# Patient Record
Sex: FEMALE | Race: WHITE | HISPANIC OR LATINO | Employment: UNEMPLOYED | RURAL
[De-identification: names, ages, dates, MRNs, and addresses within clinical notes are randomized per-mention and may not be internally consistent; named-entity substitution may affect disease eponyms.]

---

## 2017-01-16 ENCOUNTER — OFFICE VISIT (OUTPATIENT)
Dept: RETAIL CLINIC | Facility: CLINIC | Age: 27
End: 2017-01-16

## 2017-01-16 VITALS
DIASTOLIC BLOOD PRESSURE: 78 MMHG | RESPIRATION RATE: 18 BRPM | HEART RATE: 98 BPM | SYSTOLIC BLOOD PRESSURE: 118 MMHG | HEIGHT: 66 IN | OXYGEN SATURATION: 98 % | WEIGHT: 250 LBS | TEMPERATURE: 98.1 F | BODY MASS INDEX: 40.18 KG/M2

## 2017-01-16 DIAGNOSIS — A08.4 VIRAL GASTROENTERITIS: Primary | ICD-10-CM

## 2017-01-16 PROCEDURE — 99213 OFFICE O/P EST LOW 20 MIN: CPT | Performed by: NURSE PRACTITIONER

## 2017-01-16 RX ORDER — ONDANSETRON 4 MG/1
4 TABLET, ORALLY DISINTEGRATING ORAL EVERY 8 HOURS PRN
Qty: 10 TABLET | Refills: 0 | Status: SHIPPED | OUTPATIENT
Start: 2017-01-16 | End: 2017-01-18

## 2017-01-16 NOTE — PATIENT INSTRUCTIONS
Viral Gastroenteritis  Viral gastroenteritis is also called stomach flu. This illness is caused by a certain type of germ (virus). It can cause sudden watery poop (diarrhea) and throwing up (vomiting). This can cause you to lose body fluids (dehydration). This illness usually lasts for 3 to 8 days. It usually goes away on its own.  HOME CARE   · Drink enough fluids to keep your pee (urine) clear or pale yellow. Drink small amounts of fluids often.  · Ask your doctor how to replace body fluid losses (rehydration).  · Avoid:    Foods high in sugar.    Alcohol.    Bubbly (carbonated) drinks.    Tobacco.    Juice.    Caffeine drinks.    Very hot or cold fluids.    Fatty, greasy foods.    Eating too much at one time.    Dairy products until 24 to 48 hours after your watery poop stops.  · You may eat foods with active cultures (probiotics). They can be found in some yogurts and supplements.  · Wash your hands well to avoid spreading the illness.  · Only take medicines as told by your doctor. Do not give aspirin to children. Do not take medicines for watery poop (antidiarrheals).  · Ask your doctor if you should keep taking your regular medicines.  · Keep all doctor visits as told.  GET HELP RIGHT AWAY IF:   · You cannot keep fluids down.  · You do not pee at least once every 6 to 8 hours.  · You are short of breath.  · You see blood in your poop or throw up. This may look like coffee grounds.  · You have belly (abdominal) pain that gets worse or is just in one small spot (localized).  · You keep throwing up or having watery poop.  · You have a fever.  · The patient is a child younger than 3 months, and he or she has a fever.  · The patient is a child older than 3 months, and he or she has a fever and problems that do not go away.  · The patient is a child older than 3 months, and he or she has a fever and problems that suddenly get worse.  · The patient is a baby, and he or she has no tears when crying.  MAKE SURE YOU:    · Understand these instructions.  · Will watch your condition.  · Will get help right away if you are not doing well or get worse.     This information is not intended to replace advice given to you by your health care provider. Make sure you discuss any questions you have with your health care provider.     Document Released: 06/05/2009 Document Revised: 03/11/2013 Document Reviewed: 10/03/2012  [employer] Interactive Patient Education ©2016 [employer]    Food Choices to Help Relieve Diarrhea, Adult  When you have diarrhea, the foods you eat and your eating habits are very important. Choosing the right foods and drinks can help relieve diarrhea. Also, because diarrhea can last up to 7 days, you need to replace lost fluids and electrolytes (such as sodium, potassium, and chloride) in order to help prevent dehydration.   WHAT GENERAL GUIDELINES DO I NEED TO FOLLOW?  · Slowly drink 1 cup (8 oz) of fluid for each episode of diarrhea. If you are getting enough fluid, your urine will be clear or pale yellow.  · Eat starchy foods. Some good choices include white rice, white toast, pasta, low-fiber cereal, baked potatoes (without the skin), saltine crackers, and bagels.  · Avoid large servings of any cooked vegetables.  · Limit fruit to two servings per day. A serving is ½ cup or 1 small piece.  · Choose foods with less than 2 g of fiber per serving.  · Limit fats to less than 8 tsp (38 g) per day.  · Avoid fried foods.  · Eat foods that have probiotics in them. Probiotics can be found in certain dairy products.  · Avoid foods and beverages that may increase the speed at which food moves through the stomach and intestines (gastrointestinal tract). Things to avoid include:  ¨ High-fiber foods, such as dried fruit, raw fruits and vegetables, nuts, seeds, and whole grain foods.  ¨ Spicy foods and high-fat foods.  ¨ Foods and beverages sweetened with high-fructose corn syrup, honey, or sugar alcohols such as xylitol,  sorbitol, and mannitol.  WHAT FOODS ARE RECOMMENDED?  Grains  White rice. White, French, or luz maria breads (fresh or toasted), including plain rolls, buns, or bagels. White pasta. Saltine, soda, or anuel crackers. Pretzels. Low-fiber cereal. Cooked cereals made with water (such as cornmeal, farina, or cream cereals). Plain muffins. Matzo. [place] toast. Zwieback.   Vegetables  Potatoes (without the skin). Strained tomato and vegetable juices. Most well-cooked and canned vegetables without seeds. Tender lettuce.  Fruits  Cooked or canned applesauce, apricots, cherries, fruit cocktail, grapefruit, peaches, pears, or plums. Fresh bananas, apples without skin, cherries, grapes, cantaloupe, grapefruit, peaches, oranges, or plums.   Meat and Other Protein Products  Baked or boiled chicken. Eggs. Tofu. Fish. Seafood. Smooth peanut butter. Ground or well-cooked tender beef, ham, veal, lamb, pork, or poultry.   Dairy  Plain yogurt, kefir, and unsweetened liquid yogurt. Lactose-free milk, buttermilk, or soy milk. Plain hard cheese.  Beverages  Sport drinks. Clear broths. Diluted fruit juices (except prune). Regular, caffeine-free sodas such as ginger ale. Water. Decaffeinated teas. Oral rehydration solutions. Sugar-free beverages not sweetened with sugar alcohols.  Other  Bouillon, broth, or soups made from recommended foods.   The items listed above may not be a complete list of recommended foods or beverages. Contact your dietitian for more options.  WHAT FOODS ARE NOT RECOMMENDED?  Grains  Whole grain, whole wheat, bran, or rye breads, rolls, pastas, crackers, and cereals. Wild or brown rice. Cereals that contain more than 2 g of fiber per serving. Corn tortillas or taco shells. Cooked or dry oatmeal. Granola. Popcorn.  Vegetables  Raw vegetables. Cabbage, broccoli, [place] sprouts, artichokes, baked beans, beet greens, corn, kale, legumes, peas, sweet potatoes, and yams. Potato skins. Cooked spinach and  cabbage.  Fruits  Dried fruit, including raisins and dates. Raw fruits. Stewed or dried prunes. Fresh apples with skin, apricots, mangoes, pears, raspberries, and strawberries.   Meat and Other Protein Products  [place] peanut butter. Nuts and seeds. Beans and lentils. Correia.   Dairy  High-fat cheeses. Milk, chocolate milk, and beverages made with milk, such as milk shakes. Cream. Ice cream.  Sweets and Desserts  Sweet rolls, doughnuts, and sweet breads. Pancakes and waffles.  Fats and Oils  Butter. Cream sauces. Margarine. Salad oils. Plain salad dressings. Olives. Avocados.   Beverages  Caffeinated beverages (such as coffee, tea, soda, or energy drinks). Alcoholic beverages. Fruit juices with pulp. Prune juice. Soft drinks sweetened with high-fructose corn syrup or sugar alcohols.  Other  Coconut. Hot sauce. Chili powder. Mayonnaise. Gravy. Cream-based or milk-based soups.   The items listed above may not be a complete list of foods and beverages to avoid. Contact your dietitian for more information.  WHAT SHOULD I DO IF I BECOME DEHYDRATED?  Diarrhea can sometimes lead to dehydration. Signs of dehydration include dark urine and dry mouth and skin. If you think you are dehydrated, you should rehydrate with an oral rehydration solution. These solutions can be purchased at pharmacies, retail stores, or online.   Drink ½-1 cup (120-240 mL) of oral rehydration solution each time you have an episode of diarrhea. If drinking this amount makes your diarrhea worse, try drinking smaller amounts more often. For example, drink 1-3 tsp (5-15 mL) every 5-10 minutes.   A general rule for staying hydrated is to drink 1½-2 L of fluid per day. Talk to your health care provider about the specific amount you should be drinking each day. Drink enough fluids to keep your urine clear or pale yellow.     This information is not intended to replace advice given to you by your health care provider. Make sure you discuss any questions you  have with your health care provider.     Document Released: 03/09/2005 Document Revised: 01/08/2016 Document Reviewed: 11/10/2014  [employer]vier Interactive Patient Education ©2016 [employer] Inc.

## 2017-01-16 NOTE — MR AVS SNAPSHOT
Mariela Stuart   1/16/2017 9:00 AM   Office Visit    Dept Phone:  [phone]   Encounter #:  [id number]    Provider:  LUIS MAGANA   Department:  [facility]                Your Full Care Plan              Today's Medication Changes          These changes are accurate as of: 1/16/17  9:30 AM.  If you have any questions, ask your nurse or doctor.               New Medication(s)Ordered:     ondansetron ODT 4 MG disintegrating tablet   Commonly known as:  ZOFRAN ODT   Take 1 tablet by mouth Every 8 (Eight) Hours As Needed for nausea or vomiting for up to 2 days.            Where to Get Your Medications      These medications were sent to [facility] [address] [place] [address] - [address] - [phone]  - [phone] [facility] [email or web address] [facility], [facility] [address]     Phone:  [phone]     ondansetron ODT 4 MG disintegrating tablet                  Your Updated Medication List          This list is accurate as of: 1/16/17  9:30 AM.  Always use your most recent med list.                albuterol 108 (90 BASE) MCG/ACT inhaler   Commonly known as:  PROVENTIL HFA;VENTOLIN HFA   Inhale 2 puffs Every 4 (Four) Hours As Needed for wheezing.       celecoxib 100 MG capsule   Commonly known as:  CeleBREX       clarithromycin 500 MG tablet   Commonly known as:  BIAXIN       dextromethorphan-guaifenesin  MG/5ML syrup   Commonly known as:  ROBITUSSIN-DM       diclofenac-misoprostol 75-0.2 MG EC tablet   Commonly known as:  ARTHROTEC 75       dicyclomine 10 MG capsule   Commonly known as:  BENTYL       estradiol cypionate 5 MG/ML injection   Commonly known as:  DEPO-ESTRADIOL       HYDROcodone-acetaminophen 7.5-325 MG per tablet   Commonly known as:  NORCO   Take 1 tablet by mouth Every 6 (Six) Hours As Needed for moderate pain (4-6).       meloxicam 7.5 MG tablet   Commonly known as:  MOBIC       methocarbamol 750 MG tablet   Commonly known as:  ROBAXIN       metoprolol  tartrate 100 MG tablet   Commonly known as:  LOPRESSOR       ondansetron ODT 4 MG disintegrating tablet   Commonly known as:  ZOFRAN ODT   Take 1 tablet by mouth Every 8 (Eight) Hours As Needed for nausea or vomiting for up to 2 days.       oxaprozin 600 MG tablet   Commonly known as:  DAYPRO   Take 1 tablet by mouth 2 (Two) Times a Day.       PRILOSEC PO       promethazine-dextromethorphan 6.25-15 MG/5ML syrup   Commonly known as:  PROMETHAZINE-DM   Take 5 mL by mouth 4 (Four) Times a Day As Needed for cough.       traMADol 50 MG tablet   Commonly known as:  ULTRAM               You Were Diagnosed With        Codes Comments    Viral gastroenteritis    -  Primary ICD-10-CM: A08.4  ICD-9-CM: 008.8       Instructions    Viral Gastroenteritis  Viral gastroenteritis is also called stomach flu. This illness is caused by a certain type of germ (virus). It can cause sudden watery poop (diarrhea) and throwing up (vomiting). This can cause you to lose body fluids (dehydration). This illness usually lasts for 3 to 8 days. It usually goes away on its own.  HOME CARE   · Drink enough fluids to keep your pee (urine) clear or pale yellow. Drink small amounts of fluids often.  · Ask your doctor how to replace body fluid losses (rehydration).  · Avoid:    Foods high in sugar.    Alcohol.    Bubbly (carbonated) drinks.    Tobacco.    Juice.    Caffeine drinks.    Very hot or cold fluids.    Fatty, greasy foods.    Eating too much at one time.    Dairy products until 24 to 48 hours after your watery poop stops.  · You may eat foods with active cultures (probiotics). They can be found in some yogurts and supplements.  · Wash your hands well to avoid spreading the illness.  · Only take medicines as told by your doctor. Do not give aspirin to children. Do not take medicines for watery poop (antidiarrheals).  · Ask your doctor if you should keep taking your regular medicines.  · Keep all doctor visits as told.  GET HELP RIGHT AWAY IF:    · You cannot keep fluids down.  · You do not pee at least once every 6 to 8 hours.  · You are short of breath.  · You see blood in your poop or throw up. This may look like coffee grounds.  · You have belly (abdominal) pain that gets worse or is just in one small spot (localized).  · You keep throwing up or having watery poop.  · You have a fever.  · The patient is a child younger than 3 months, and he or she has a fever.  · The patient is a child older than 3 months, and he or she has a fever and problems that do not go away.  · The patient is a child older than 3 months, and he or she has a fever and problems that suddenly get worse.  · The patient is a baby, and he or she has no tears when crying.  MAKE SURE YOU:   · Understand these instructions.  · Will watch your condition.  · Will get help right away if you are not doing well or get worse.     This information is not intended to replace advice given to you by your health care provider. Make sure you discuss any questions you have with your health care provider.     Document Released: 06/05/2009 Document Revised: 03/11/2013 Document Reviewed: 10/03/2012  [email or web address] Interactive Patient Education ©2016 [email or web address]    Food Choices to Help Relieve Diarrhea, Adult  When you have diarrhea, the foods you eat and your eating habits are very important. Choosing the right foods and drinks can help relieve diarrhea. Also, because diarrhea can last up to 7 days, you need to replace lost fluids and electrolytes (such as sodium, potassium, and chloride) in order to help prevent dehydration.   WHAT GENERAL GUIDELINES DO I NEED TO FOLLOW?  · Slowly drink 1 cup (8 oz) of fluid for each episode of diarrhea. If you are getting enough fluid, your urine will be clear or pale yellow.  · Eat starchy foods. Some good choices include white rice, white toast, pasta, low-fiber cereal, baked potatoes (without the skin), saltine crackers, and bagels.  · Avoid large servings of any  cooked vegetables.  · Limit fruit to two servings per day. A serving is ½ cup or 1 small piece.  · Choose foods with less than 2 g of fiber per serving.  · Limit fats to less than 8 tsp (38 g) per day.  · Avoid fried foods.  · Eat foods that have probiotics in them. Probiotics can be found in certain dairy products.  · Avoid foods and beverages that may increase the speed at which food moves through the stomach and intestines (gastrointestinal tract). Things to avoid include:  ¨ High-fiber foods, such as dried fruit, raw fruits and vegetables, nuts, seeds, and whole grain foods.  ¨ Spicy foods and high-fat foods.  ¨ Foods and beverages sweetened with high-fructose corn syrup, honey, or sugar alcohols such as xylitol, sorbitol, and mannitol.  WHAT FOODS ARE RECOMMENDED?  Grains  White rice. White, French, or luz maria breads (fresh or toasted), including plain rolls, buns, or bagels. White pasta. Saltine, soda, or anuel crackers. Pretzels. Low-fiber cereal. Cooked cereals made with water (such as cornmeal, farina, or cream cereals). Plain muffins. Matzo. [place] toast. Zwieback.   Vegetables  Potatoes (without the skin). Strained tomato and vegetable juices. Most well-cooked and canned vegetables without seeds. Tender lettuce.  Fruits  Cooked or canned applesauce, apricots, cherries, fruit cocktail, grapefruit, peaches, pears, or plums. Fresh bananas, apples without skin, cherries, grapes, cantaloupe, grapefruit, peaches, oranges, or plums.   Meat and Other Protein Products  Baked or boiled chicken. Eggs. Tofu. Fish. Seafood. Smooth peanut butter. Ground or well-cooked tender beef, ham, veal, lamb, pork, or poultry.   Dairy  Plain yogurt, kefir, and unsweetened liquid yogurt. Lactose-free milk, buttermilk, or soy milk. Plain hard cheese.  Beverages  Sport drinks. Clear broths. Diluted fruit juices (except prune). Regular, caffeine-free sodas such as ginger ale. Water. Decaffeinated teas. Oral rehydration solutions.  Sugar-free beverages not sweetened with sugar alcohols.  Other  Bouillon, broth, or soups made from recommended foods.   The items listed above may not be a complete list of recommended foods or beverages. Contact your dietitian for more options.  WHAT FOODS ARE NOT RECOMMENDED?  Grains  Whole grain, whole wheat, bran, or rye breads, rolls, pastas, crackers, and cereals. Wild or brown rice. Cereals that contain more than 2 g of fiber per serving. Corn tortillas or taco shells. Cooked or dry oatmeal. Granola. Popcorn.  Vegetables  Raw vegetables. Cabbage, broccoli, [place] sprouts, artichokes, baked beans, beet greens, corn, kale, legumes, peas, sweet potatoes, and yams. Potato skins. Cooked spinach and cabbage.  Fruits  Dried fruit, including raisins and dates. Raw fruits. Stewed or dried prunes. Fresh apples with skin, apricots, mangoes, pears, raspberries, and strawberries.   Meat and Other Protein Products  [place] peanut butter. Nuts and seeds. Beans and lentils. Correia.   Dairy  High-fat cheeses. Milk, chocolate milk, and beverages made with milk, such as milk shakes. Cream. Ice cream.  Sweets and Desserts  Sweet rolls, doughnuts, and sweet breads. Pancakes and waffles.  Fats and Oils  Butter. Cream sauces. Margarine. Salad oils. Plain salad dressings. Olives. Avocados.   Beverages  Caffeinated beverages (such as coffee, tea, soda, or energy drinks). Alcoholic beverages. Fruit juices with pulp. Prune juice. Soft drinks sweetened with high-fructose corn syrup or sugar alcohols.  Other  Coconut. Hot sauce. Chili powder. Mayonnaise. Gravy. Cream-based or milk-based soups.   The items listed above may not be a complete list of foods and beverages to avoid. Contact your dietitian for more information.  WHAT SHOULD I DO IF I BECOME DEHYDRATED?  Diarrhea can sometimes lead to dehydration. Signs of dehydration include dark urine and dry mouth and skin. If you think you are dehydrated, you should rehydrate with an oral  rehydration solution. These solutions can be purchased at pharmacies, retail stores, or online.   Drink ½-1 cup (120-240 mL) of oral rehydration solution each time you have an episode of diarrhea. If drinking this amount makes your diarrhea worse, try drinking smaller amounts more often. For example, drink 1-3 tsp (5-15 mL) every 5-10 minutes.   A general rule for staying hydrated is to drink 1½-2 L of fluid per day. Talk to your health care provider about the specific amount you should be drinking each day. Drink enough fluids to keep your urine clear or pale yellow.     This information is not intended to replace advice given to you by your health care provider. Make sure you discuss any questions you have with your health care provider.     Document Released: 03/09/2005 Document Revised: 01/08/2016 Document Reviewed: 11/10/2014  [employer] Interactive Patient Education ©2016 [employer]       Patient Instructions History      Upcoming Appointments     Visit Type Date Time Department    OFFICE VISIT 1/16/2017  9:00 AM [facility]    FOLLOW UP 1/17/2017 10:20 AM [facility]      MyChart Signup     Our records indicate that you have declined [facility] [employer] signup. If you would like to sign up for [employer], please email [email or web address] or call [phone] to obtain an activation code.             Other Info from Your Visit           Your Appointments     Jan 17, 2017 10:20 AM CST   Follow Up with Marvin Jensen MD   [facility] (--)    [address] [facility] [address]  [facility] [address] [phone]           Arrive 15 minutes prior to appointment.              Allergies     Amiodarone      Latex      Nabumetone Intolerance Nausea Only    Penicillins  Hives    Sulfa Antibiotics  Hives      Reason for Visit     Vomiting x 4 days      Vital Signs     Blood Pressure Pulse Temperature Respirations Height Weight    118/78 (BP Location: Right arm,  "Patient Position: Sitting, Cuff Size: Adult) 98 98.1 °F (36.7 °C) (Tympanic) 18 65.5\" (166.4 cm) 250 lb (113 kg)    Last Menstrual Period Oxygen Saturation Body Mass Index Smoking Status          11/15/2016 98% 40.97 kg/m2 Never Smoker        Problems and Diagnoses Noted     Viral gastroenteritis    -  Primary        "

## 2017-01-16 NOTE — PROGRESS NOTES
Subjective   Mariela Stuart is a 26 y.o. female.     Vomiting    This is a new problem. Episode onset: 4 days ago. Episode frequency: 4 times daily. The problem has been unchanged. There has been no fever. Associated symptoms include diarrhea. Pertinent negatives include no abdominal pain, chills, fever or myalgias. Treatments tried: Papto Bismol, TUMS. The treatment provided mild relief.        The following portions of the patient's history were reviewed and updated as appropriate: allergies, current medications, past family history, past medical history, past social history, past surgical history and problem list.    Review of Systems   Constitutional: Negative for chills and fever.   HENT: Negative.    Respiratory: Negative.    Cardiovascular: Negative.    Gastrointestinal: Positive for diarrhea, nausea and vomiting. Negative for abdominal pain and blood in stool.   Musculoskeletal: Negative for myalgias.       Objective   Physical Exam   Constitutional: Vital signs are normal. She appears well-developed and well-nourished. She is active.   Cardiovascular: Normal rate, regular rhythm, S1 normal, S2 normal and normal heart sounds.    Pulmonary/Chest: She has no decreased breath sounds. She has no wheezes. She has no rhonchi. She has no rales.   Abdominal: Soft. Normal appearance. There is generalized tenderness (Mild generalized abdominal discomfort with palpation). There is no rigidity, no rebound and no guarding.   Neurological: She is alert.       Assessment/Plan   Mariela was seen today for vomiting.    Diagnoses and all orders for this visit:    Viral gastroenteritis  -     ondansetron ODT (ZOFRAN ODT) 4 MG disintegrating tablet; Take 1 tablet by mouth Every 8 (Eight) Hours As Needed for nausea or vomiting for up to 2 days.      - When nausea controlled, begin sipping fluids and advance diet as tolerated.  - If develops fever, increased abdominal pain, or vomiting not controlled with medication, see PCP or  go to ER.

## 2017-02-19 ENCOUNTER — OFFICE VISIT (OUTPATIENT)
Dept: RETAIL CLINIC | Facility: CLINIC | Age: 27
End: 2017-02-19

## 2017-02-19 VITALS
RESPIRATION RATE: 18 BRPM | WEIGHT: 251 LBS | TEMPERATURE: 98.5 F | HEIGHT: 65 IN | HEART RATE: 101 BPM | BODY MASS INDEX: 41.82 KG/M2 | OXYGEN SATURATION: 98 % | DIASTOLIC BLOOD PRESSURE: 74 MMHG | SYSTOLIC BLOOD PRESSURE: 118 MMHG

## 2017-02-19 DIAGNOSIS — G43.009 MIGRAINE WITHOUT AURA AND WITHOUT STATUS MIGRAINOSUS, NOT INTRACTABLE: Primary | ICD-10-CM

## 2017-02-19 DIAGNOSIS — R11.0 NAUSEA: ICD-10-CM

## 2017-02-19 PROCEDURE — 99213 OFFICE O/P EST LOW 20 MIN: CPT | Performed by: NURSE PRACTITIONER

## 2017-02-19 PROCEDURE — 96372 THER/PROPH/DIAG INJ SC/IM: CPT | Performed by: NURSE PRACTITIONER

## 2017-02-19 RX ORDER — ONDANSETRON 4 MG/1
4 TABLET, ORALLY DISINTEGRATING ORAL EVERY 8 HOURS PRN
Qty: 10 TABLET | Refills: 0 | Status: SHIPPED | OUTPATIENT
Start: 2017-02-19 | End: 2017-02-21

## 2017-02-19 RX ORDER — KETOROLAC TROMETHAMINE 30 MG/ML
60 INJECTION, SOLUTION INTRAMUSCULAR; INTRAVENOUS ONCE
Status: COMPLETED | OUTPATIENT
Start: 2017-02-19 | End: 2017-02-19

## 2017-02-19 RX ADMIN — KETOROLAC TROMETHAMINE 60 MG: 30 INJECTION, SOLUTION INTRAMUSCULAR; INTRAVENOUS at 15:46

## 2017-02-19 NOTE — PATIENT INSTRUCTIONS
Migraine Headache  A migraine headache is very bad, throbbing pain on one or both sides of your head. Talk to your doctor about what things may bring on (trigger) your migraine headaches.  HOME CARE  · Only take medicines as told by your doctor.  · Lie down in a dark, quiet room when you have a migraine.  · Keep a journal to find out if certain things bring on migraine headaches. For example, write down:    What you eat and drink.    How much sleep you get.    Any change to your diet or medicines.  · Lessen how much alcohol you drink.  · Quit smoking if you smoke.  · Get enough sleep.  · Lessen any stress in your life.  · Keep lights dim if bright lights bother you or make your migraines worse.  GET HELP RIGHT AWAY IF:   · Your migraine becomes really bad.  · You have a fever.  · You have a stiff neck.  · You have trouble seeing.  · Your muscles are weak, or you lose muscle control.  · You lose your balance or have trouble walking.  · You feel like you will pass out (faint), or you pass out.  · You have really bad symptoms that are different than your first symptoms.  MAKE SURE YOU:   · Understand these instructions.  · Will watch your condition.  · Will get help right away if you are not doing well or get worse.     This information is not intended to replace advice given to you by your health care provider. Make sure you discuss any questions you have with your health care provider.     Document Released: 09/26/2009 Document Revised: 03/11/2013 Document Reviewed: 08/25/2014  [employer] Interactive Patient Education ©2016 [employer] Inc.

## 2017-02-19 NOTE — PROGRESS NOTES
Subjective   Mariela Stuart is a 26 y.o. female.     Headache    This is a new problem. The current episode started yesterday. The problem occurs constantly. The problem has been gradually worsening. The pain is located in the bilateral region. The pain radiates to the right neck and left neck. The pain quality is similar to prior headaches (Has history of migraine headaches. Has not had one in several years but states this is the exact same type headache.). The pain is at a severity of 9/10. The pain is moderate. Associated symptoms include nausea, neck pain and photophobia. Pertinent negatives include no abnormal behavior, blurred vision, coughing, dizziness, ear pain, eye pain, fever, loss of balance, muscle aches, numbness, rhinorrhea, seizures, sinus pressure, sore throat, tingling, visual change, vomiting or weakness. The symptoms are aggravated by bright light. Treatments tried: Took Tylenol yesterday and a Norco today (strained her left shoulder that was operated on a few months ago) The treatment provided no relief. Her past medical history is significant for migraine headaches.        The following portions of the patient's history were reviewed and updated as appropriate: allergies, current medications, past family history, past medical history, past social history, past surgical history and problem list.    Review of Systems   Constitutional: Negative for chills and fever.   HENT: Negative for congestion, ear pain, postnasal drip, rhinorrhea, sinus pressure and sore throat.    Eyes: Positive for photophobia. Negative for blurred vision, pain and visual disturbance.   Respiratory: Negative for cough.    Cardiovascular: Negative.    Gastrointestinal: Positive for nausea. Negative for diarrhea and vomiting.   Musculoskeletal: Positive for neck pain.   Neurological: Positive for headaches. Negative for dizziness, tingling, tremors, seizures, syncope, facial asymmetry, speech difficulty, weakness,  light-headedness, numbness and loss of balance.       Objective   Physical Exam   Constitutional: She is oriented to person, place, and time. Vital signs are normal. She appears well-developed and well-nourished. She is active.   HENT:   Head: Normocephalic.   Right Ear: Hearing, tympanic membrane, external ear and ear canal normal.   Left Ear: Hearing, tympanic membrane, external ear and ear canal normal.   Nose: Nose normal. Right sinus exhibits no maxillary sinus tenderness and no frontal sinus tenderness. Left sinus exhibits no maxillary sinus tenderness and no frontal sinus tenderness.   Mouth/Throat: Uvula is midline, oropharynx is clear and moist and mucous membranes are normal.   Eyes: Conjunctivae, EOM and lids are normal. Pupils are equal, round, and reactive to light.   Neck: Normal range of motion. Neck supple.   Cardiovascular: Normal rate, regular rhythm, S1 normal, S2 normal and normal heart sounds.    Pulmonary/Chest: Effort normal and breath sounds normal. She has no decreased breath sounds. She has no wheezes. She has no rhonchi. She has no rales.   Lymphadenopathy:     She has no cervical adenopathy.   Neurological: She is alert and oriented to person, place, and time. She has normal strength. No cranial nerve deficit or sensory deficit. She displays a negative Romberg sign.       Assessment/Plan   Mariela was seen today for headache.    Diagnoses and all orders for this visit:    Migraine without aura and without status migrainosus, not intractable  -     ketorolac (TORADOL) injection 60 mg; Inject 60 mg into the shoulder, thigh, or buttocks 1 (One) Time.    Nausea  -     ondansetron ODT (ZOFRAN ODT) 4 MG disintegrating tablet; Take 1 tablet by mouth Every 8 (Eight) Hours As Needed for nausea or vomiting for up to 2 days.      - Given Toradol injection  - Lie down in dark quiet room  - Tylenol as needed  - If headache continues or worsens, begins vomiting, or has visual disturbance, go to ER

## 2017-05-05 ENCOUNTER — OFFICE VISIT (OUTPATIENT)
Dept: RETAIL CLINIC | Facility: CLINIC | Age: 27
End: 2017-05-05

## 2017-05-05 VITALS
HEART RATE: 112 BPM | TEMPERATURE: 99.2 F | RESPIRATION RATE: 20 BRPM | WEIGHT: 243.2 LBS | DIASTOLIC BLOOD PRESSURE: 80 MMHG | SYSTOLIC BLOOD PRESSURE: 140 MMHG | HEIGHT: 66 IN | OXYGEN SATURATION: 98 % | BODY MASS INDEX: 39.08 KG/M2

## 2017-05-05 DIAGNOSIS — J01.00 ACUTE MAXILLARY SINUSITIS, RECURRENCE NOT SPECIFIED: ICD-10-CM

## 2017-05-05 DIAGNOSIS — J40 BRONCHITIS: Primary | ICD-10-CM

## 2017-05-05 PROCEDURE — 99213 OFFICE O/P EST LOW 20 MIN: CPT | Performed by: NURSE PRACTITIONER

## 2017-05-05 PROCEDURE — 96372 THER/PROPH/DIAG INJ SC/IM: CPT | Performed by: NURSE PRACTITIONER

## 2017-05-05 RX ORDER — TOPIRAMATE 25 MG/1
25 TABLET ORAL 2 TIMES DAILY
COMMUNITY
End: 2018-04-20 | Stop reason: ALTCHOICE

## 2017-05-05 RX ORDER — ALBUTEROL SULFATE 90 UG/1
2 AEROSOL, METERED RESPIRATORY (INHALATION) EVERY 4 HOURS PRN
Qty: 6.7 G | Refills: 0 | Status: SHIPPED | OUTPATIENT
Start: 2017-05-05 | End: 2021-09-13

## 2017-05-05 RX ORDER — BUTALBITAL, ACETAMINOPHEN AND CAFFEINE 50; 325; 40 MG/1; MG/1; MG/1
1 TABLET ORAL EVERY 4 HOURS PRN
COMMUNITY
End: 2018-04-20 | Stop reason: ALTCHOICE

## 2017-05-05 RX ORDER — BROMPHENIRAMINE MALEATE, PSEUDOEPHEDRINE HYDROCHLORIDE, AND DEXTROMETHORPHAN HYDROBROMIDE 2; 30; 10 MG/5ML; MG/5ML; MG/5ML
5-10 SYRUP ORAL 4 TIMES DAILY PRN
Qty: 120 ML | Refills: 0 | Status: SHIPPED | OUTPATIENT
Start: 2017-05-05 | End: 2018-04-10

## 2017-05-05 RX ORDER — CLARITHROMYCIN 500 MG/1
500 TABLET, COATED ORAL 2 TIMES DAILY
Qty: 20 TABLET | Refills: 0 | Status: SHIPPED | OUTPATIENT
Start: 2017-05-05 | End: 2017-05-15

## 2017-05-05 RX ORDER — METHYLPREDNISOLONE ACETATE 80 MG/ML
80 INJECTION, SUSPENSION INTRA-ARTICULAR; INTRALESIONAL; INTRAMUSCULAR; SOFT TISSUE ONCE
Status: COMPLETED | OUTPATIENT
Start: 2017-05-05 | End: 2017-05-05

## 2017-05-05 RX ORDER — NABUMETONE 500 MG/1
500 TABLET, FILM COATED ORAL 2 TIMES DAILY PRN
COMMUNITY
End: 2018-04-20 | Stop reason: ALTCHOICE

## 2017-05-05 RX ADMIN — METHYLPREDNISOLONE ACETATE 80 MG: 80 INJECTION, SUSPENSION INTRA-ARTICULAR; INTRALESIONAL; INTRAMUSCULAR; SOFT TISSUE at 15:09

## 2017-05-08 ENCOUNTER — OFFICE VISIT (OUTPATIENT)
Dept: RETAIL CLINIC | Facility: CLINIC | Age: 27
End: 2017-05-08

## 2017-05-08 VITALS
OXYGEN SATURATION: 98 % | TEMPERATURE: 98.6 F | SYSTOLIC BLOOD PRESSURE: 108 MMHG | RESPIRATION RATE: 20 BRPM | HEIGHT: 66 IN | WEIGHT: 239 LBS | HEART RATE: 103 BPM | BODY MASS INDEX: 38.41 KG/M2 | DIASTOLIC BLOOD PRESSURE: 72 MMHG

## 2017-05-08 DIAGNOSIS — R11.2 NAUSEA AND VOMITING, INTRACTABILITY OF VOMITING NOT SPECIFIED, UNSPECIFIED VOMITING TYPE: Primary | ICD-10-CM

## 2017-05-08 PROCEDURE — 96372 THER/PROPH/DIAG INJ SC/IM: CPT | Performed by: NURSE PRACTITIONER

## 2017-05-08 PROCEDURE — 99213 OFFICE O/P EST LOW 20 MIN: CPT | Performed by: NURSE PRACTITIONER

## 2017-05-08 RX ORDER — PROMETHAZINE HYDROCHLORIDE 25 MG/ML
25 INJECTION, SOLUTION INTRAMUSCULAR; INTRAVENOUS ONCE
Status: COMPLETED | OUTPATIENT
Start: 2017-05-08 | End: 2017-05-08

## 2017-05-08 RX ORDER — ONDANSETRON 4 MG/1
4 TABLET, ORALLY DISINTEGRATING ORAL EVERY 8 HOURS PRN
Qty: 10 TABLET | Refills: 0 | Status: SHIPPED | OUTPATIENT
Start: 2017-05-08 | End: 2017-05-10

## 2017-05-08 RX ADMIN — PROMETHAZINE HYDROCHLORIDE 25 MG: 25 INJECTION, SOLUTION INTRAMUSCULAR; INTRAVENOUS at 09:32

## 2017-06-09 ENCOUNTER — APPOINTMENT (OUTPATIENT)
Dept: GENERAL RADIOLOGY | Facility: HOSPITAL | Age: 27
End: 2017-06-09

## 2017-06-09 ENCOUNTER — HOSPITAL ENCOUNTER (EMERGENCY)
Facility: HOSPITAL | Age: 27
Discharge: HOME | End: 2017-06-09
Attending: EMERGENCY MEDICINE | Admitting: EMERGENCY MEDICINE

## 2017-06-09 VITALS
TEMPERATURE: 98.1 F | WEIGHT: 238 LBS | BODY MASS INDEX: 39.65 KG/M2 | HEIGHT: 65 IN | HEART RATE: 106 BPM | SYSTOLIC BLOOD PRESSURE: 124 MMHG | RESPIRATION RATE: 26 BRPM | OXYGEN SATURATION: 96 % | DIASTOLIC BLOOD PRESSURE: 68 MMHG

## 2017-06-09 DIAGNOSIS — R07.89 CHEST WALL PAIN: ICD-10-CM

## 2017-06-09 DIAGNOSIS — R09.1 PLEURISY: Primary | ICD-10-CM

## 2017-06-09 LAB
ALBUMIN SERPL-MCNC: 4 G/DL (ref 3.4–4.8)
ALBUMIN/GLOB SERPL: 1.3 G/DL (ref 1.1–1.8)
ALP SERPL-CCNC: 106 U/L (ref 38–126)
ALT SERPL W P-5'-P-CCNC: 44 U/L (ref 9–52)
ANION GAP SERPL CALCULATED.3IONS-SCNC: 11 MMOL/L (ref 5–15)
AST SERPL-CCNC: 23 U/L (ref 14–36)
BASOPHILS # BLD AUTO: 0.02 10*3/MM3 (ref 0–0.2)
BASOPHILS NFR BLD AUTO: 0.2 % (ref 0–2)
BILIRUB SERPL-MCNC: 0.3 MG/DL (ref 0.2–1.3)
BUN BLD-MCNC: 10 MG/DL (ref 7–21)
BUN/CREAT SERPL: 18.5 (ref 7–25)
CALCIUM SPEC-SCNC: 9.2 MG/DL (ref 8.4–10.2)
CHLORIDE SERPL-SCNC: 104 MMOL/L (ref 95–110)
CO2 SERPL-SCNC: 24 MMOL/L (ref 22–31)
CREAT BLD-MCNC: 0.54 MG/DL (ref 0.5–1)
D-DIMER, QUANTITATIVE (MAD,POW, STR): 403 NG/ML (FEU) (ref 0–470)
DEPRECATED RDW RBC AUTO: 45.8 FL (ref 36.4–46.3)
EOSINOPHIL # BLD AUTO: 0.22 10*3/MM3 (ref 0–0.7)
EOSINOPHIL NFR BLD AUTO: 1.7 % (ref 0–7)
ERYTHROCYTE [DISTWIDTH] IN BLOOD BY AUTOMATED COUNT: 16.4 % (ref 11.5–14.5)
GFR SERPL CREATININE-BSD FRML MDRD: 135 ML/MIN/1.73 (ref 71–165)
GLOBULIN UR ELPH-MCNC: 3.2 GM/DL (ref 2.3–3.5)
GLUCOSE BLD-MCNC: 90 MG/DL (ref 60–100)
HCT VFR BLD AUTO: 39.4 % (ref 35–45)
HGB BLD-MCNC: 12.8 G/DL (ref 12–15.5)
HOLD SPECIMEN: NORMAL
HOLD SPECIMEN: NORMAL
IMM GRANULOCYTES # BLD: 0.04 10*3/MM3 (ref 0–0.02)
IMM GRANULOCYTES NFR BLD: 0.3 % (ref 0–0.5)
INR PPP: 1.06 (ref 0.8–1.2)
LYMPHOCYTES # BLD AUTO: 2.82 10*3/MM3 (ref 0.6–4.2)
LYMPHOCYTES NFR BLD AUTO: 22 % (ref 10–50)
MCH RBC QN AUTO: 24.5 PG (ref 26.5–34)
MCHC RBC AUTO-ENTMCNC: 32.5 G/DL (ref 31.4–36)
MCV RBC AUTO: 75.3 FL (ref 80–98)
MONOCYTES # BLD AUTO: 0.68 10*3/MM3 (ref 0–0.9)
MONOCYTES NFR BLD AUTO: 5.3 % (ref 0–12)
NEUTROPHILS # BLD AUTO: 9.05 10*3/MM3 (ref 2–8.6)
NEUTROPHILS NFR BLD AUTO: 70.5 % (ref 37–80)
NT-PROBNP SERPL-MCNC: 24.9 PG/ML (ref 0–450)
PLATELET # BLD AUTO: 408 10*3/MM3 (ref 150–450)
PMV BLD AUTO: 9.6 FL (ref 8–12)
POTASSIUM BLD-SCNC: 4 MMOL/L (ref 3.5–5.1)
PROT SERPL-MCNC: 7.2 G/DL (ref 6.3–8.6)
PROTHROMBIN TIME: 13.7 SECONDS (ref 11.1–15.3)
RBC # BLD AUTO: 5.23 10*6/MM3 (ref 3.77–5.16)
SODIUM BLD-SCNC: 139 MMOL/L (ref 137–145)
TROPONIN I SERPL-MCNC: <0.012 NG/ML
WBC NRBC COR # BLD: 12.83 10*3/MM3 (ref 3.2–9.8)
WHOLE BLOOD HOLD SPECIMEN: NORMAL
WHOLE BLOOD HOLD SPECIMEN: NORMAL

## 2017-06-09 PROCEDURE — 96374 THER/PROPH/DIAG INJ IV PUSH: CPT

## 2017-06-09 PROCEDURE — 83880 ASSAY OF NATRIURETIC PEPTIDE: CPT | Performed by: EMERGENCY MEDICINE

## 2017-06-09 PROCEDURE — 84484 ASSAY OF TROPONIN QUANT: CPT | Performed by: EMERGENCY MEDICINE

## 2017-06-09 PROCEDURE — 80053 COMPREHEN METABOLIC PANEL: CPT | Performed by: EMERGENCY MEDICINE

## 2017-06-09 PROCEDURE — 93005 ELECTROCARDIOGRAM TRACING: CPT

## 2017-06-09 PROCEDURE — 25010000002 LORAZEPAM PER 2 MG: Performed by: EMERGENCY MEDICINE

## 2017-06-09 PROCEDURE — 96375 TX/PRO/DX INJ NEW DRUG ADDON: CPT

## 2017-06-09 PROCEDURE — 99284 EMERGENCY DEPT VISIT MOD MDM: CPT

## 2017-06-09 PROCEDURE — 25010000002 HYDROMORPHONE PER 4 MG: Performed by: EMERGENCY MEDICINE

## 2017-06-09 PROCEDURE — 25010000002 ONDANSETRON PER 1 MG: Performed by: EMERGENCY MEDICINE

## 2017-06-09 PROCEDURE — 25010000002 KETOROLAC TROMETHAMINE PER 15 MG: Performed by: EMERGENCY MEDICINE

## 2017-06-09 PROCEDURE — 85610 PROTHROMBIN TIME: CPT | Performed by: EMERGENCY MEDICINE

## 2017-06-09 PROCEDURE — 85025 COMPLETE CBC W/AUTO DIFF WBC: CPT | Performed by: EMERGENCY MEDICINE

## 2017-06-09 PROCEDURE — 96361 HYDRATE IV INFUSION ADD-ON: CPT

## 2017-06-09 PROCEDURE — 85379 FIBRIN DEGRADATION QUANT: CPT | Performed by: EMERGENCY MEDICINE

## 2017-06-09 PROCEDURE — 25010000002 METHYLPREDNISOLONE PER 125 MG: Performed by: EMERGENCY MEDICINE

## 2017-06-09 PROCEDURE — 93010 ELECTROCARDIOGRAM REPORT: CPT | Performed by: INTERNAL MEDICINE

## 2017-06-09 PROCEDURE — 71020 HC CHEST PA AND LATERAL: CPT

## 2017-06-09 RX ORDER — HYDROCODONE BITARTRATE AND ACETAMINOPHEN 5; 325 MG/1; MG/1
1 TABLET ORAL EVERY 6 HOURS PRN
Qty: 12 TABLET | Refills: 0 | Status: SHIPPED | OUTPATIENT
Start: 2017-06-09 | End: 2018-04-20 | Stop reason: ALTCHOICE

## 2017-06-09 RX ORDER — AZITHROMYCIN 250 MG/1
TABLET, FILM COATED ORAL
Qty: 6 TABLET | Refills: 0 | Status: SHIPPED | OUTPATIENT
Start: 2017-06-09 | End: 2018-04-10

## 2017-06-09 RX ORDER — LORAZEPAM 2 MG/ML
1 INJECTION INTRAMUSCULAR ONCE
Status: COMPLETED | OUTPATIENT
Start: 2017-06-09 | End: 2017-06-09

## 2017-06-09 RX ORDER — IPRATROPIUM BROMIDE AND ALBUTEROL SULFATE 2.5; .5 MG/3ML; MG/3ML
3 SOLUTION RESPIRATORY (INHALATION) ONCE
Status: COMPLETED | OUTPATIENT
Start: 2017-06-09 | End: 2017-06-09

## 2017-06-09 RX ORDER — ONDANSETRON 4 MG/1
4 TABLET, ORALLY DISINTEGRATING ORAL EVERY 8 HOURS PRN
COMMUNITY
End: 2018-04-20 | Stop reason: ALTCHOICE

## 2017-06-09 RX ORDER — CLONAZEPAM 0.5 MG/1
0.5 TABLET ORAL 2 TIMES DAILY PRN
COMMUNITY
End: 2018-04-20 | Stop reason: ALTCHOICE

## 2017-06-09 RX ORDER — METHYLPREDNISOLONE SODIUM SUCCINATE 125 MG/2ML
125 INJECTION, POWDER, LYOPHILIZED, FOR SOLUTION INTRAMUSCULAR; INTRAVENOUS ONCE
Status: COMPLETED | OUTPATIENT
Start: 2017-06-09 | End: 2017-06-09

## 2017-06-09 RX ORDER — PREDNISONE 50 MG/1
50 TABLET ORAL DAILY
Qty: 5 TABLET | Refills: 0 | Status: SHIPPED | OUTPATIENT
Start: 2017-06-09 | End: 2018-04-10

## 2017-06-09 RX ORDER — ONDANSETRON 2 MG/ML
4 INJECTION INTRAMUSCULAR; INTRAVENOUS EVERY 6 HOURS PRN
Status: DISCONTINUED | OUTPATIENT
Start: 2017-06-09 | End: 2017-06-10 | Stop reason: HOSPADM

## 2017-06-09 RX ORDER — SODIUM CHLORIDE 0.9 % (FLUSH) 0.9 %
10 SYRINGE (ML) INJECTION AS NEEDED
Status: DISCONTINUED | OUTPATIENT
Start: 2017-06-09 | End: 2017-06-10 | Stop reason: HOSPADM

## 2017-06-09 RX ORDER — KETOROLAC TROMETHAMINE 30 MG/ML
30 INJECTION, SOLUTION INTRAMUSCULAR; INTRAVENOUS ONCE
Status: COMPLETED | OUTPATIENT
Start: 2017-06-09 | End: 2017-06-09

## 2017-06-09 RX ADMIN — HYDROMORPHONE HYDROCHLORIDE 0.5 MG: 1 INJECTION, SOLUTION INTRAMUSCULAR; INTRAVENOUS; SUBCUTANEOUS at 21:57

## 2017-06-09 RX ADMIN — ONDANSETRON 4 MG: 2 INJECTION INTRAMUSCULAR; INTRAVENOUS at 20:06

## 2017-06-09 RX ADMIN — METHYLPREDNISOLONE SODIUM SUCCINATE 125 MG: 125 INJECTION, POWDER, FOR SOLUTION INTRAMUSCULAR; INTRAVENOUS at 21:46

## 2017-06-09 RX ADMIN — KETOROLAC TROMETHAMINE 30 MG: 30 INJECTION, SOLUTION INTRAMUSCULAR at 20:03

## 2017-06-09 RX ADMIN — SODIUM CHLORIDE 500 ML: 9 INJECTION, SOLUTION INTRAVENOUS at 20:02

## 2017-06-09 RX ADMIN — Medication 10 ML: at 21:46

## 2017-06-09 RX ADMIN — HYDROMORPHONE HYDROCHLORIDE 1 MG: 1 INJECTION, SOLUTION INTRAMUSCULAR; INTRAVENOUS; SUBCUTANEOUS at 21:47

## 2017-06-09 RX ADMIN — IPRATROPIUM BROMIDE AND ALBUTEROL SULFATE 3 ML: 2.5; .5 SOLUTION RESPIRATORY (INHALATION) at 21:47

## 2017-06-09 RX ADMIN — LORAZEPAM 1 MG: 2 INJECTION INTRAMUSCULAR; INTRAVENOUS at 20:06

## 2017-06-10 NOTE — DISCHARGE INSTRUCTIONS
Followup with Dr. Crowder for further management as needed.  Return with any new or worsening symptoms or any concerns.

## 2017-06-10 NOTE — ED PROVIDER NOTES
Subjective   HPI Comments: Patient presents with a chest discomfort to the left upper chest this evening.  States worse with palpation, deep breathing and when she walks the area pulls.  Cramping type sensation with squeezing quality.  Radiation down the left arm with the squeeze.  No f/c.  No n/v.  No recent illnesses, no cardiac history.  No significant family hx.  Patient is on birth control, depoprovera.        History provided by:  Patient   used: No        Review of Systems   Constitutional: Negative.  Negative for appetite change, chills and fever.   HENT: Negative.  Negative for congestion.    Eyes: Negative.  Negative for photophobia and visual disturbance.   Respiratory: Positive for shortness of breath. Negative for cough and chest tightness.    Cardiovascular: Positive for chest pain. Negative for palpitations.   Gastrointestinal: Positive for abdominal pain (some epigastric discomfort earlier today) and nausea. Negative for constipation, diarrhea and vomiting.   Endocrine: Negative.    Genitourinary: Negative.  Negative for decreased urine volume, dysuria, flank pain and hematuria.   Musculoskeletal: Negative.  Negative for arthralgias, back pain, myalgias, neck pain and neck stiffness.   Skin: Negative.  Negative for pallor.   Neurological: Negative.  Negative for dizziness, syncope, weakness, light-headedness, numbness and headaches.   Psychiatric/Behavioral: Negative.  Negative for confusion and suicidal ideas. The patient is not nervous/anxious.    All other systems reviewed and are negative.      Past Medical History:   Diagnosis Date   • Acid reflux    • Allergic    • Anxiety    • Depression    • Gallbladder abscess    • Migraines    • Sinusitis    • Stomach ulcer        Allergies   Allergen Reactions   • Amiodarone    • Latex    • Nabumetone Nausea Only   • Penicillins Hives   • Sulfa Antibiotics Hives       Past Surgical History:   Procedure Laterality Date   •  CHOLECYSTECTOMY     • FINGER NAIL SURGERY     • GALLBLADDER SURGERY     • TONSILLECTOMY AND ADENOIDECTOMY     • WISDOM TOOTH EXTRACTION         Family History   Problem Relation Age of Onset   • Arthritis Mother    • Diabetes Mother    • Cancer Mother        Social History     Social History   • Marital status: Single     Spouse name: N/A   • Number of children: N/A   • Years of education: N/A     Social History Main Topics   • Smoking status: Never Smoker   • Smokeless tobacco: None   • Alcohol use No   • Drug use: No   • Sexual activity: Defer     Other Topics Concern   • None     Social History Narrative   • None           Objective   Physical Exam   Constitutional: She is oriented to person, place, and time. She appears well-developed and well-nourished. She appears distressed (anxious).   HENT:   Head: Normocephalic and atraumatic.   Nose: Nose normal.   Mouth/Throat: Oropharynx is clear and moist.   Eyes: Conjunctivae and EOM are normal. No scleral icterus.   Neck: Normal range of motion. Neck supple. No JVD present.   Cardiovascular: Normal rate, regular rhythm, normal heart sounds and intact distal pulses.  Exam reveals no gallop and no friction rub.    No murmur heard.  Left sided chest discomfort with palpation, deep breathing, and movement of the arm.  Intermittent spasm in the region.    Pulmonary/Chest: Effort normal. No respiratory distress. She has no wheezes. She has no rales. She exhibits no tenderness.   Abdominal: Soft. She exhibits no distension and no mass. There is no tenderness. There is no rebound and no guarding.   Musculoskeletal: Normal range of motion. She exhibits no edema, tenderness or deformity.   Lymphadenopathy:     She has no cervical adenopathy.   Neurological: She is alert and oriented to person, place, and time. No cranial nerve deficit. She exhibits normal muscle tone.   Skin: Skin is warm and dry. No rash noted. She is not diaphoretic. No erythema. No pallor.   Psychiatric:  She has a normal mood and affect. Her behavior is normal. Judgment and thought content normal.   Nursing note and vitals reviewed.      Procedures         ED Course  ED Course      Labs Reviewed   CBC WITH AUTO DIFFERENTIAL - Abnormal; Notable for the following:        Result Value    WBC 12.83 (*)     RBC 5.23 (*)     MCV 75.3 (*)     MCH 24.5 (*)     RDW 16.4 (*)     Neutrophils, Absolute 9.05 (*)     Immature Grans, Absolute 0.04 (*)     All other components within normal limits   TROPONIN (IN-HOUSE) - Normal   COMPREHENSIVE METABOLIC PANEL - Normal   BNP (IN-HOUSE) - Normal   PROTIME-INR - Normal    Narrative:     Therapeutic range for most indications is 2.0-3.0 INR,  or 2.5-3.5 for mechanical heart valves.   D-DIMER, QUANTITATIVE - Normal    Narrative:     Dimer values <500 ng/ml FEU are FDA approved as aid in diagnosis of deep venous thrombosis and pulmonary embolism.  This test should not be used in an exclusion strategy with pretest probability alone.    A recent guideline regarding diagnosis for pulmonary thomboembolism recommends an adjusted exclusion criterion of age x 10 ng/ml FEU for patients >50 years of age ([employer] Intern Med 2015; 163: 701-711).   RAINBOW DRAW    Narrative:     The following orders were created for panel order [place] Draw.  Procedure                               Abnormality         Status                     ---------                               -----------         ------                     Light Blue Top[[id number]]                                   Final result               Green Top (Gel)[[id number]]                                  Final result               Lavender Top[[id number]]                                     Final result               Gold Top - SST[[id number]]                                   Final result                 Please view results for these tests on the individual orders.   TROPONIN (IN-HOUSE)   LIGHT BLUE TOP   GREEN TOP   LAVENDER TOP   GOLD TOP - SST    CBC AND DIFFERENTIAL    Narrative:     The following orders were created for panel order CBC & Differential.  Procedure                               Abnormality         Status                     ---------                               -----------         ------                     CBC Auto Differential[[id number]]        Abnormal            Final result                 Please view results for these tests on the individual orders.       XR Chest 2 View   Final Result   No radiographic evidence of acute cardiopulmonary   disease.      Electronically signed by:  Dorita Syed MD  6/9/2017 8:23 PM CDT   Workstation: [id number]        No significant acute findings.  Suspect musculoskeletal vrs pleurisy.  Outpatient followup.                [facility]    Final diagnoses:   Pleurisy   Chest wall pain            Jaya Issa MD  06/09/17 [id number]

## 2018-04-10 ENCOUNTER — HOSPITAL ENCOUNTER (EMERGENCY)
Facility: HOSPITAL | Age: 28
Discharge: HOME | End: 2018-04-10
Attending: EMERGENCY MEDICINE | Admitting: EMERGENCY MEDICINE

## 2018-04-10 ENCOUNTER — APPOINTMENT (OUTPATIENT)
Dept: GENERAL RADIOLOGY | Facility: HOSPITAL | Age: 28
End: 2018-04-10

## 2018-04-10 ENCOUNTER — APPOINTMENT (OUTPATIENT)
Dept: CT IMAGING | Facility: HOSPITAL | Age: 28
End: 2018-04-10

## 2018-04-10 VITALS
DIASTOLIC BLOOD PRESSURE: 74 MMHG | TEMPERATURE: 97.9 F | RESPIRATION RATE: 20 BRPM | HEIGHT: 65 IN | OXYGEN SATURATION: 97 % | HEART RATE: 101 BPM | WEIGHT: 240 LBS | BODY MASS INDEX: 39.99 KG/M2 | SYSTOLIC BLOOD PRESSURE: 144 MMHG

## 2018-04-10 DIAGNOSIS — J02.9 PHARYNGITIS, UNSPECIFIED ETIOLOGY: Primary | ICD-10-CM

## 2018-04-10 LAB
HETEROPH AB SER QL LA: NEGATIVE
S PYO AG THROAT QL: NEGATIVE

## 2018-04-10 PROCEDURE — 87880 STREP A ASSAY W/OPTIC: CPT | Performed by: EMERGENCY MEDICINE

## 2018-04-10 PROCEDURE — 70490 CT SOFT TISSUE NECK W/O DYE: CPT

## 2018-04-10 PROCEDURE — 70360 X-RAY EXAM OF NECK: CPT

## 2018-04-10 PROCEDURE — 86308 HETEROPHILE ANTIBODY SCREEN: CPT | Performed by: EMERGENCY MEDICINE

## 2018-04-10 PROCEDURE — 87081 CULTURE SCREEN ONLY: CPT | Performed by: EMERGENCY MEDICINE

## 2018-04-10 PROCEDURE — 99283 EMERGENCY DEPT VISIT LOW MDM: CPT

## 2018-04-10 RX ORDER — CETIRIZINE HYDROCHLORIDE, PSEUDOEPHEDRINE HYDROCHLORIDE 5; 120 MG/1; MG/1
1 TABLET, FILM COATED, EXTENDED RELEASE ORAL 2 TIMES DAILY
Qty: 14 TABLET | Refills: 0 | Status: SHIPPED | OUTPATIENT
Start: 2018-04-10 | End: 2018-04-20 | Stop reason: ALTCHOICE

## 2018-04-11 NOTE — ED PROVIDER NOTES
Subjective   28yo female presents ED c/o 3wk hx odynophagia.  Pt reports initial symptoms included sinus pressure/nasal congestion/post nasal discharge.  Pt seen urgent care for same with rx zithromax/steroid injection, followed by clarithromycin rx.  Pt reports URI symptoms resolved, however, has persistent odynophagia, dysphagia.  ROS neg fever/chills/cough/rash.        Sore Throat   Location:  Generalized  Timing:  Constant  Chronicity:  New      Review of Systems   Constitutional: Negative.    HENT: Positive for sore throat.    Eyes: Negative.    Respiratory: Negative.    Cardiovascular: Negative.    Gastrointestinal: Negative.        Past Medical History:   Diagnosis Date   • Acid reflux    • Allergic    • Anxiety    • Depression    • Gallbladder abscess    • Migraines    • Sinusitis    • Stomach ulcer        Allergies   Allergen Reactions   • Amiodarone    • Latex    • Nabumetone Nausea Only   • Penicillins Hives   • Sulfa Antibiotics Hives       Past Surgical History:   Procedure Laterality Date   • CHOLECYSTECTOMY     • FINGER NAIL SURGERY     • GALLBLADDER SURGERY     • TONSILLECTOMY AND ADENOIDECTOMY     • WISDOM TOOTH EXTRACTION         Family History   Problem Relation Age of Onset   • Arthritis Mother    • Diabetes Mother    • Cancer Mother        Social History     Social History   • Marital status: Single     Social History Main Topics   • Smoking status: Never Smoker   • Alcohol use No   • Drug use: No   • Sexual activity: Defer     Other Topics Concern   • Not on file           Objective   Physical Exam   Constitutional: She is oriented to person, place, and time. She appears well-developed and well-nourished.   HENT:   Head: Normocephalic and atraumatic.   Right Ear: External ear normal.   Left Ear: External ear normal.   Nose: Nose normal.   Mouth/Throat: Uvula is midline and mucous membranes are normal. Posterior oropharyngeal erythema present. No oropharyngeal exudate, posterior oropharyngeal  edema or tonsillar abscesses.   Eyes: Pupils are equal, round, and reactive to light.   Neck: Neck supple. No JVD present. No tracheal deviation present.   Cardiovascular: Normal rate, regular rhythm, normal heart sounds and intact distal pulses.  Exam reveals no gallop and no friction rub.    No murmur heard.  Pulmonary/Chest: Effort normal and breath sounds normal. No stridor. She has no wheezes. She has no rales.   Abdominal: Soft. Bowel sounds are normal. She exhibits no mass. There is no tenderness. There is no rebound and no guarding.   Lymphadenopathy:     She has cervical adenopathy.   Neurological: She is oriented to person, place, and time.   Skin: Skin is warm and dry. Capillary refill takes less than 2 seconds.   Nursing note and vitals reviewed.      Procedures         ED Course  ED Course      Labs Reviewed   RAPID STREP A SCREEN - Normal   MONONUCLEOSIS SCREEN - Normal   BETA HEMOLYTIC STREP CULTURE, THROAT     Xr Neck Soft Tissue    Result Date: 4/10/2018  Narrative: PROCEDURE: XR NECK SOFT TISSUE CLINICAL INDICATION: odynophagia COMPARISON STUDY: None VIEWS: 2 FINDINGS: Airway is patent. The tip of the epiglottis is not well evaluated and may be slightly thickened.. No prevertebral soft tissue swelling is evident. No abnormal gas collections in soft tissues. Mild reversal of normal lordotic curve is present which may be positional or due to muscle spasm.     Impression: The epiglottis is not optimally visualized and the tip may be slightly thickened. Epiglottitis, while an unusual finding in a patient of this age, cannot be excluded. No other significant abnormality identified. Electronically signed by:  Jyoti Alejandra MD  4/10/2018 9:53 PM CDT Workstation: [id number]    Ct Soft Tissue Neck Without Contrast    Result Date: 4/10/2018  Narrative: CT neck without contrast on  4/10/2018 CLINICAL INDICATION: Sore throat, difficulty swallowing, pharyngitis TECHNIQUE: Multiple axial images are obtained  throughout the neck without the administration of contrast. This exam was performed according to our departmental dose-optimization program, which includes automated exposure control, adjustment of the mA and/or kV according to patient size and/or use of iterative reconstruction technique. Total DLP is 306.5 mGy*cm. COMPARISON: None FINDINGS: Evaluation of the neck without contrast is limited. The lung apices are clear. Mild likely reactive bilateral jugular chain adenopathy is noted. There is no prevertebral soft tissue swelling. The epiglottis and airway is unremarkable. No mass or fluid collection is noted. No gross mucosal lesion is noted. No bony abnormality is noted.     Impression: Essentially unremarkable examination by unenhanced imaging. Electronically signed by:  Donn Mercer  4/10/2018 10:22 PM CDT Workstation: [id number]                MDM    Final diagnoses:   Pharyngitis, unspecified etiology            Harinder Rodriguez MD  04/10/18 [id number]

## 2018-04-11 NOTE — ED NOTES
PT states she had upper respiratory infection 3 weeks ago, but the sore throat persist, and hard to swallow and eat.     Tal Leone, RNA  04/10/18 2052

## 2018-04-13 LAB — BACTERIA SPEC AEROBE CULT: NORMAL

## 2018-04-20 ENCOUNTER — OFFICE VISIT (OUTPATIENT)
Dept: GASTROENTEROLOGY | Facility: CLINIC | Age: 28
End: 2018-04-20

## 2018-04-20 VITALS
WEIGHT: 245.2 LBS | BODY MASS INDEX: 40.85 KG/M2 | SYSTOLIC BLOOD PRESSURE: 108 MMHG | DIASTOLIC BLOOD PRESSURE: 70 MMHG | HEART RATE: 84 BPM | HEIGHT: 65 IN

## 2018-04-20 DIAGNOSIS — R11.14 BILIOUS VOMITING WITH NAUSEA: ICD-10-CM

## 2018-04-20 DIAGNOSIS — R63.0 DECREASED APPETITE: ICD-10-CM

## 2018-04-20 DIAGNOSIS — R13.10 DYSPHAGIA, UNSPECIFIED TYPE: Primary | ICD-10-CM

## 2018-04-20 DIAGNOSIS — K21.9 GASTROESOPHAGEAL REFLUX DISEASE, ESOPHAGITIS PRESENCE NOT SPECIFIED: ICD-10-CM

## 2018-04-20 PROCEDURE — 99214 OFFICE O/P EST MOD 30 MIN: CPT | Performed by: NURSE PRACTITIONER

## 2018-04-20 RX ORDER — PANTOPRAZOLE SODIUM 40 MG/1
40 TABLET, DELAYED RELEASE ORAL DAILY
Qty: 30 TABLET | Refills: 5 | Status: SHIPPED | OUTPATIENT
Start: 2018-04-20 | End: 2020-09-03

## 2018-04-20 RX ORDER — DEXTROSE AND SODIUM CHLORIDE 5; .45 G/100ML; G/100ML
30 INJECTION, SOLUTION INTRAVENOUS CONTINUOUS PRN
Status: CANCELLED | OUTPATIENT
Start: 2018-05-23

## 2018-04-20 RX ORDER — OMEPRAZOLE 40 MG/1
40 CAPSULE, DELAYED RELEASE ORAL DAILY
COMMUNITY
End: 2018-04-20

## 2018-04-20 NOTE — PROGRESS NOTES
Chief Complaint   Patient presents with   • Difficulty Swallowing   • Heartburn       Subjective    Adri Stuart is a 27 y.o. female. she is being seen for consultation today at the request of MARIA GUADALUPE Hanna.  Patient reports over the last 3 weeks she began having dysphagia which has become severely worsened in the last 2-3 days.  Accompanied by reflux.  Also reports a decreased appetite.  She denies any changes in her bowel habits or blood within her stool.    Difficulty Swallowing   This is a new problem. The current episode started more than 1 month ago. The problem occurs daily. Associated symptoms include abdominal pain, coughing (cough up mucousy material ), fatigue, nausea, a sore throat, vomiting (about 3 times per day depending on intake) and weakness. Pertinent negatives include no anorexia, arthralgias, change in bowel habit, chest pain, chills, congestion or neck pain.   Heartburn   She complains of abdominal pain, belching, choking, coughing (cough up mucousy material ), early satiety, globus sensation, heartburn, a hoarse voice, nausea and a sore throat. She reports no chest pain. This is a chronic problem. The current episode started more than 1 year ago (Started 1-2 years ago recently worsened ). The problem has been gradually worsening. The heartburn wakes her from sleep. The heartburn limits her activity. The heartburn changes with position. The symptoms are aggravated by certain foods (all foods ). Associated symptoms include fatigue. She has tried a PPI, a diet change, an antacid and a histamine-2 antagonist (prilosec, MOM, tums, pepcid, zantac) for the symptoms. The treatment provided mild relief. Past procedures include an abdominal ultrasound and an EGD. Stent in CBD removed in 2012.   Plan; we'll schedule patient for EGD due to dysphagia, reflux, nausea vomiting and abdominal pain.  We'll start patient on PPI daily.  Follow-up after test return to office sooner if  needed.         The following portions of the patient's history were reviewed and updated as appropriate:   Past Medical History:   Diagnosis Date   • Acid reflux    • Allergic    • Anxiety    • Depression    • Gallbladder abscess    • Migraines    • Sinusitis    • Stomach ulcer      Past Surgical History:   Procedure Laterality Date   • CHOLECYSTECTOMY     • FINGER NAIL SURGERY     • GALLBLADDER SURGERY     • TONSILLECTOMY AND ADENOIDECTOMY     • WISDOM TOOTH EXTRACTION       Family History   Problem Relation Age of Onset   • Arthritis Mother    • Diabetes Mother    • Cancer Mother      breat cancer   • Hypertension Mother    • Hypertension Father    • Hypertension Maternal Grandmother    • Heart disease Maternal Grandfather    • Hypertension Maternal Grandfather      OB History     No data available        Current Outpatient Prescriptions   Medication Sig Dispense Refill   • albuterol (PROVENTIL HFA;VENTOLIN HFA) 108 (90 BASE) MCG/ACT inhaler Inhale 2 puffs Every 4 (Four) Hours As Needed for Wheezing. 6.7 g 0   • estradiol cypionate (DEPO-ESTRADIOL) 5 MG/ML injection Inject  into the shoulder, thigh, or buttocks Every 28 (Twenty-Eight) Days.     • pantoprazole (PROTONIX) 40 MG EC tablet Take 1 tablet by mouth Daily. 30 tablet 5     No current facility-administered medications for this visit.      Allergies   Allergen Reactions   • Amiodarone    • Latex    • Nabumetone Nausea Only   • Penicillins Hives   • Sulfa Antibiotics Hives     Social History     Social History   • Marital status: Single     Social History Main Topics   • Smoking status: Never Smoker   • Smokeless tobacco: Never Used   • Alcohol use No   • Drug use: No   • Sexual activity: Defer     Other Topics Concern   • Not on file       Review of Systems  Review of Systems   Constitutional: Positive for fatigue. Negative for chills.   HENT: Positive for hoarse voice and sore throat. Negative for congestion.    Respiratory: Positive for cough (cough up  "mucousy material ) and choking.    Cardiovascular: Negative for chest pain.   Gastrointestinal: Positive for abdominal pain, heartburn, nausea and vomiting (about 3 times per day depending on intake). Negative for anorexia and change in bowel habit.   Musculoskeletal: Negative for arthralgias and neck pain.   Neurological: Positive for weakness.        /70   Pulse 84   Ht 165.1 cm (65\")   Wt 111 kg (245 lb 3.2 oz)   BMI 40.80 kg/m²     Objective    Physical Exam   Constitutional: She is oriented to person, place, and time. She appears well-developed and well-nourished. She is cooperative. No distress.   HENT:   Head: Normocephalic and atraumatic.   Neck: Normal range of motion. Neck supple. No thyromegaly present.   Cardiovascular: Normal rate, regular rhythm and normal heart sounds.    Pulmonary/Chest: Effort normal and breath sounds normal. She has no wheezes. She has no rhonchi. She has no rales.   Abdominal: Soft. Normal appearance and bowel sounds are normal. She exhibits no distension. There is no hepatosplenomegaly. There is generalized tenderness. There is no rigidity and no guarding. No hernia.   Lymphadenopathy:     She has no cervical adenopathy.   Neurological: She is alert and oriented to person, place, and time.   Skin: Skin is warm, dry and intact. No rash noted. No pallor.   Psychiatric: She has a normal mood and affect. Her speech is normal.     Admission on 04/10/2018, Discharged on 04/10/2018   Component Date Value Ref Range Status   • Strep A Ag 04/10/2018 Negative  Negative Final   • Monospot 04/10/2018 Negative  Negative Final   • Throat Culture, Beta Strep 04/13/2018 No Beta Hemolytic Streptococcus Isolated at 2 days   Final     Assessment/Plan      1. Dysphagia, unspecified type    2. Gastroesophageal reflux disease, esophagitis presence not specified    3. Bilious vomiting with nausea    4. Decreased appetite    .       Orders placed during this encounter " include:    ESOPHAGOGASTRODUODENOSCOPY possible dilation (N/A)    Review and/or summary of lab tests, radiology, procedures, medications. Review and summary of old records and obtaining of history. The risks and benefits of my recommendations, as well as other treatment options were discussed with the patient today. Questions were answered.    New Medications Ordered This Visit   Medications   • pantoprazole (PROTONIX) 40 MG EC tablet     Sig: Take 1 tablet by mouth Daily.     Dispense:  30 tablet     Refill:  5       Follow-up: Return in about 4 weeks (around 5/18/2018) for Recheck after procedure.          This document has been electronically signed by MARIA GUADALUPE Heredia on April 24, 2018 4:25 PM             Results for orders placed or performed during the hospital encounter of 04/10/18   Rapid Strep A Screen - Swab, Throat   Result Value Ref Range    Strep A Ag Negative Negative   Mononucleosis Screen   Result Value Ref Range    Monospot Negative Negative   Beta Strep Culture, Throat - Swab, Throat   Result Value Ref Range    Throat Culture, Beta Strep No Beta Hemolytic Streptococcus Isolated at 2 days    Results for orders placed or performed during the hospital encounter of 06/09/17   Gold Top - SST   Result Value Ref Range    Extra Tube Hold for add-ons.    Green Top (Gel)   Result Value Ref Range    Extra Tube Hold for add-ons.    CBC Auto Differential   Result Value Ref Range    WBC 12.83 (H) 3.20 - 9.80 10*3/mm3    RBC 5.23 (H) 3.77 - 5.16 10*6/mm3    Hemoglobin 12.8 12.0 - 15.5 g/dL    Hematocrit 39.4 35.0 - 45.0 %    MCV 75.3 (L) 80.0 - 98.0 fL    MCH 24.5 (L) 26.5 - 34.0 pg    MCHC 32.5 31.4 - 36.0 g/dL    RDW 16.4 (H) 11.5 - 14.5 %    RDW-SD 45.8 36.4 - 46.3 fl    MPV 9.6 8.0 - 12.0 fL    Platelets 408 150 - 450 10*3/mm3    Neutrophil % 70.5 37.0 - 80.0 %    Lymphocyte % 22.0 10.0 - 50.0 %    Monocyte % 5.3 0.0 - 12.0 %    Eosinophil % 1.7 0.0 - 7.0 %    Basophil % 0.2 0.0 - 2.0 %    Immature Grans %  0.3 0.0 - 0.5 %    Neutrophils, Absolute 9.05 (H) 2.00 - 8.60 10*3/mm3    Lymphocytes, Absolute 2.82 0.60 - 4.20 10*3/mm3    Monocytes, Absolute 0.68 0.00 - 0.90 10*3/mm3    Eosinophils, Absolute 0.22 0.00 - 0.70 10*3/mm3    Basophils, Absolute 0.02 0.00 - 0.20 10*3/mm3    Immature Grans, Absolute 0.04 (H) 0.00 - 0.02 10*3/mm3   Lavender Top   Result Value Ref Range    Extra Tube hold for add-on    Light Blue Top   Result Value Ref Range    Extra Tube hold for add-on    Troponin   Result Value Ref Range    Troponin I <0.012 <=0.034 ng/mL   Protime-INR   Result Value Ref Range    Protime 13.7 11.1 - 15.3 Seconds    INR 1.06 0.80 - 1.20   D-dimer, Quantitative   Result Value Ref Range    D-Dimer, Quantitative 403 0 - 470 ng/mL (FEU)   BNP   Result Value Ref Range    proBNP 24.9 0.0 - 450.0 pg/mL   Comprehensive Metabolic Panel   Result Value Ref Range    Glucose 90 60 - 100 mg/dL    BUN 10 7 - 21 mg/dL    Creatinine 0.54 0.50 - 1.00 mg/dL    Sodium 139 137 - 145 mmol/L    Potassium 4.0 3.5 - 5.1 mmol/L    Chloride 104 95 - 110 mmol/L    CO2 24.0 22.0 - 31.0 mmol/L    Calcium 9.2 8.4 - 10.2 mg/dL    Total Protein 7.2 6.3 - 8.6 g/dL    Albumin 4.00 3.40 - 4.80 g/dL    ALT (SGPT) 44 9 - 52 U/L    AST (SGOT) 23 14 - 36 U/L    Alkaline Phosphatase 106 38 - 126 U/L    Total Bilirubin 0.3 0.2 - 1.3 mg/dL    eGFR Non  Amer 135 71 - 165 mL/min/1.73    Globulin 3.2 2.3 - 3.5 gm/dL    A/G Ratio 1.3 1.1 - 1.8 g/dL    BUN/Creatinine Ratio 18.5 7.0 - 25.0    Anion Gap 11.0 5.0 - 15.0 mmol/L   Results for orders placed or performed during the hospital encounter of 11/28/16   Converted Surgical Pathology   Result Value Ref Range    Spec Descr 1 SPECIMEN(S): A LEFT SHOULDER SHAVINGS    Pregnancy, Urine   Result Value Ref Range    HCG Urine, QL Negative    Results for orders placed or performed during the hospital encounter of 08/28/16   Urinalysis, Microscopic only   Result Value Ref Range    WBC, UA 6-12 (H) NONE SEEN,0-2,3-5   /HPF    RBC, UA 3-5 NONE SEEN,0-2,3-5  /HPF    Epithelial cells 3-5 NONE SEEN,0-2,3-5  /HPF    Bacteria, UA NONE SEEN NONE SEEN   Pregnancy, urine   Result Value Ref Range    HCG Urine, QL Negative    Urinalysis   Result Value Ref Range    Color, UA YELLOW STRAW,YELLOW,DK YELLOW,TONY    Appearance CLEAR CLEAR    Specific Gravity, UA 1.019 1.003 - 1.030    pH, UA 6.0 5.0 - 9.0 pH Units    Leukocytes, UA 2+ (H) NEGATIVE    Nitrite, UA NEGATIVE NEGATIVE    Protein, UA NEGATIVE NEGATIVE    Glucose, Urine NEGATIVE NEGATIVE mg/dl    Ketones, UA NEGATIVE NEGATIVE    Urobilinogen, UA 0.2 0.2 EU/dl    Blood, UA NEGATIVE NEGATIVE   CBC and Differential   Result Value Ref Range    WBC 16.1 (H) 3.2 - 9.8 x1000/uL    RBC 5.14 3.77 - 5.16 susana/mm3    Hemoglobin 13.3 12.0 - 15.5 gm/dl    Hematocrit 41.2 35.0 - 45.0 %    MCV 80.2 80.0 - 98.0 fl    MCH 25.9 (L) 26.0 - 34.0 pg    MCHC 32.3 31.4 - 36.0 gm/dl    RDW 14.3 11.5 - 14.5 %    Platelets 363 150 - 450 x1000/mm3    MPV 8.9 8.0 - 12.0 fl    Neutrophil Rel % 66.4 37.0 - 80.0 %    Lymphocyte Rel % 25.8 10.0 - 50.0 %    Monocyte Rel % 5.6 0.0 - 12.0 %    Eosinophil Rel % 1.6 0.0 - 7.0 %    Basophil Rel % 0.2 0.0 - 2.0 %    Immature Granulocyte Rel % 0.40 0.00 - 0.50 %    Neutrophils Absolute 10.70 (H) 2.00 - 8.60 x1000/uL    Lymphocytes Absolute 4.16 0.60 - 4.20 x1000/uL    Monocytes Absolute 0.91 (H) 0.00 - 0.90 x1000/uL    Eosinophils Absolute 0.25 0.00 - 0.70 x1000/uL    Basophils Absolute 0.03 0.00 - 0.20 x1000/uL    Immature Granulocytes Absolute 0.070 (H) 0.005 - 0.022 x1000/uL   Lipase   Result Value Ref Range    Lipase 55 23 - 300 U/L   Amylase   Result Value Ref Range    Amylase 61 50 - 130 U/L   Comprehensive metabolic panel   Result Value Ref Range    Sodium 140 137 - 145 mmol/L    Potassium 4.0 3.5 - 5.1 mmol/L    Chloride 103 95 - 110 mmol/L    CO2 26 22 - 31 mmol/L    Anion Gap 11.0 5.0 - 15.0 mmol/L    Glucose 95 60 - 100 mg/dl    BUN 10 7 - 21 mg/dl    Creatinine  0.6 0.5 - 1.0 mg/dl    GFR MDRD Non  121 71 - 165 mL/min/1.73 sq.M    GFR MDRD  146 71 - 165 mL/min/1.73 sq.M    Calcium 8.6 8.4 - 10.2 mg/dl    Total Protein 8.1 6.3 - 8.6 gm/dl    Albumin 4.1 3.4 - 4.8 gm/dl    Total Bilirubin 0.5 0.2 - 1.3 mg/dl    Alkaline Phosphatase 88 38 - 126 U/L    AST (SGOT) 31 14 - 36 U/L    ALT (SGPT) 35 9 - 52 U/L     *Note: Due to a large number of results and/or encounters for the requested time period, some results have not been displayed. A complete set of results can be found in Results Review.

## 2018-05-23 ENCOUNTER — ANESTHESIA (OUTPATIENT)
Dept: GASTROENTEROLOGY | Facility: HOSPITAL | Age: 28
End: 2018-05-23

## 2018-05-23 ENCOUNTER — ANESTHESIA EVENT (OUTPATIENT)
Dept: GASTROENTEROLOGY | Facility: HOSPITAL | Age: 28
End: 2018-05-23

## 2018-05-23 ENCOUNTER — HOSPITAL ENCOUNTER (OUTPATIENT)
Facility: HOSPITAL | Age: 28
Setting detail: HOSPITAL OUTPATIENT SURGERY
Discharge: HOME | End: 2018-05-23
Attending: INTERNAL MEDICINE | Admitting: INTERNAL MEDICINE

## 2018-05-23 VITALS
WEIGHT: 214.25 LBS | HEIGHT: 65 IN | RESPIRATION RATE: 18 BRPM | DIASTOLIC BLOOD PRESSURE: 66 MMHG | SYSTOLIC BLOOD PRESSURE: 127 MMHG | OXYGEN SATURATION: 96 % | HEART RATE: 99 BPM | BODY MASS INDEX: 35.7 KG/M2 | TEMPERATURE: 97.9 F

## 2018-05-23 DIAGNOSIS — R11.14 BILIOUS VOMITING WITH NAUSEA: ICD-10-CM

## 2018-05-23 DIAGNOSIS — K21.9 GASTROESOPHAGEAL REFLUX DISEASE, ESOPHAGITIS PRESENCE NOT SPECIFIED: ICD-10-CM

## 2018-05-23 DIAGNOSIS — R13.10 DYSPHAGIA, UNSPECIFIED TYPE: ICD-10-CM

## 2018-05-23 DIAGNOSIS — R63.0 DECREASED APPETITE: ICD-10-CM

## 2018-05-23 LAB — B-HCG UR QL: NEGATIVE

## 2018-05-23 PROCEDURE — 88342 IMHCHEM/IMCYTCHM 1ST ANTB: CPT | Performed by: PATHOLOGY

## 2018-05-23 PROCEDURE — 43248 EGD GUIDE WIRE INSERTION: CPT | Performed by: INTERNAL MEDICINE

## 2018-05-23 PROCEDURE — 43239 EGD BIOPSY SINGLE/MULTIPLE: CPT | Performed by: INTERNAL MEDICINE

## 2018-05-23 PROCEDURE — 88305 TISSUE EXAM BY PATHOLOGIST: CPT | Performed by: INTERNAL MEDICINE

## 2018-05-23 PROCEDURE — 25010000002 PROPOFOL 10 MG/ML EMULSION: Performed by: NURSE ANESTHETIST, CERTIFIED REGISTERED

## 2018-05-23 PROCEDURE — 88305 TISSUE EXAM BY PATHOLOGIST: CPT | Performed by: PATHOLOGY

## 2018-05-23 PROCEDURE — 25010000002 MIDAZOLAM PER 1 MG: Performed by: NURSE ANESTHETIST, CERTIFIED REGISTERED

## 2018-05-23 PROCEDURE — 81025 URINE PREGNANCY TEST: CPT | Performed by: INTERNAL MEDICINE

## 2018-05-23 PROCEDURE — 88342 IMHCHEM/IMCYTCHM 1ST ANTB: CPT | Performed by: INTERNAL MEDICINE

## 2018-05-23 RX ORDER — MIDAZOLAM HYDROCHLORIDE 1 MG/ML
INJECTION INTRAMUSCULAR; INTRAVENOUS AS NEEDED
Status: DISCONTINUED | OUTPATIENT
Start: 2018-05-23 | End: 2018-05-23 | Stop reason: SURG

## 2018-05-23 RX ORDER — DEXTROSE AND SODIUM CHLORIDE 5; .45 G/100ML; G/100ML
30 INJECTION, SOLUTION INTRAVENOUS CONTINUOUS PRN
Status: DISCONTINUED | OUTPATIENT
Start: 2018-05-23 | End: 2018-05-23 | Stop reason: HOSPADM

## 2018-05-23 RX ORDER — LIDOCAINE HYDROCHLORIDE 10 MG/ML
INJECTION, SOLUTION INFILTRATION; PERINEURAL AS NEEDED
Status: DISCONTINUED | OUTPATIENT
Start: 2018-05-23 | End: 2018-05-23 | Stop reason: SURG

## 2018-05-23 RX ORDER — DEXTROSE AND SODIUM CHLORIDE 5; .45 G/100ML; G/100ML
20 INJECTION, SOLUTION INTRAVENOUS CONTINUOUS
Status: DISCONTINUED | OUTPATIENT
Start: 2018-05-23 | End: 2018-05-23 | Stop reason: HOSPADM

## 2018-05-23 RX ORDER — PROPOFOL 10 MG/ML
VIAL (ML) INTRAVENOUS AS NEEDED
Status: DISCONTINUED | OUTPATIENT
Start: 2018-05-23 | End: 2018-05-23 | Stop reason: SURG

## 2018-05-23 RX ADMIN — PROPOFOL 50 MG: 10 INJECTION, EMULSION INTRAVENOUS at 10:37

## 2018-05-23 RX ADMIN — PROPOFOL 30 MG: 10 INJECTION, EMULSION INTRAVENOUS at 10:35

## 2018-05-23 RX ADMIN — PROPOFOL 170 MG: 10 INJECTION, EMULSION INTRAVENOUS at 10:34

## 2018-05-23 RX ADMIN — LIDOCAINE HYDROCHLORIDE 100 MG: 10 INJECTION, SOLUTION INFILTRATION; PERINEURAL at 10:34

## 2018-05-23 RX ADMIN — DEXTROSE AND SODIUM CHLORIDE 20 ML/HR: 5; 450 INJECTION, SOLUTION INTRAVENOUS at 09:16

## 2018-05-23 RX ADMIN — MIDAZOLAM 2 MG: 1 INJECTION INTRAMUSCULAR; INTRAVENOUS at 10:33

## 2018-05-23 NOTE — ANESTHESIA POSTPROCEDURE EVALUATION
Patient: Adri Stuart    Procedure Summary     Date:  05/23/18 Room / Location:  [facility] ENDOSCOPY 3 / [facility] ENDOSCOPY    Anesthesia Start:  1031 Anesthesia Stop:  1042    Procedure:  ESOPHAGOGASTRODUODENOSCOPY possible dilation (N/A Esophagus) Diagnosis:       Decreased appetite      Bilious vomiting with nausea      Gastroesophageal reflux disease, esophagitis presence not specified      Dysphagia, unspecified type      (Decreased appetite [R63.0])      (Bilious vomiting with nausea [R11.14])      (Gastroesophageal reflux disease, esophagitis presence not specified [K21.9])      (Dysphagia, unspecified type [R13.10])    Surgeon:  Rafael Garcia MD Provider:  Todd Lehman CRNA    Anesthesia Type:  MAC ASA Status:  2          Anesthesia Type: MAC  Last vitals  BP   159/86 (05/23/18 0909)   Temp   97.2 °F (36.2 °C) (05/23/18 0909)   Pulse   89 (05/23/18 0909)   Resp   20 (05/23/18 0909)     SpO2   98 % (05/23/18 0909)     Post Anesthesia Care and Evaluation    Patient location during evaluation: bedside  Patient participation: waiting for patient participation  Level of consciousness: responsive to verbal stimuli  Pain management: adequate  Airway patency: patent  Anesthetic complications: No anesthetic complications  PONV Status: none  Cardiovascular status: acceptable  Respiratory status: acceptable  Hydration status: acceptable

## 2018-05-23 NOTE — ANESTHESIA PREPROCEDURE EVALUATION
Anesthesia Evaluation     NPO Solid Status: > 8 hours  NPO Liquid Status: > 8 hours           Airway   Mallampati: II  TM distance: >3 FB  Neck ROM: full  no difficulty expected  Dental - normal exam     Pulmonary - normal exam   Cardiovascular - normal exam        Neuro/Psych  (+) headaches, psychiatric history,     GI/Hepatic/Renal/Endo    (+) obesity,  GERD,      Musculoskeletal     Abdominal    Substance History      OB/GYN          Other                        Anesthesia Plan    ASA 2     MAC     intravenous induction   Anesthetic plan and risks discussed with patient.

## 2018-05-23 NOTE — H&P
Progress Notes  Encounter Date: 5/23/2018  Rafael montaño  Gastroenterology   Expand All Collapse All    []Manual[]Template  []Copied      Chief Complaint   Patient presents with   • Difficulty Swallowing   • Heartburn            Subjective       Adri Stuart is a 27 y.o. female. she is being seen for consultation today at the request of MARIA GUADALUPE Hanna.  Patient reports over the last 3 weeks she began having dysphagia which has become severely worsened in the last 2-3 days.  Accompanied by reflux.  Also reports a decreased appetite.  She denies any changes in her bowel habits or blood within her stool.     Difficulty Swallowing   This is a new problem. The current episode started more than 1 month ago. The problem occurs daily. Associated symptoms include abdominal pain, coughing (cough up mucousy material ), fatigue, nausea, a sore throat, vomiting (about 3 times per day depending on intake) and weakness. Pertinent negatives include no anorexia, arthralgias, change in bowel habit, chest pain, chills, congestion or neck pain.   Heartburn   She complains of abdominal pain, belching, choking, coughing (cough up mucousy material ), early satiety, globus sensation, heartburn, a hoarse voice, nausea and a sore throat. She reports no chest pain. This is a chronic problem. The current episode started more than 1 year ago (Started 1-2 years ago recently worsened ). The problem has been gradually worsening. The heartburn wakes her from sleep. The heartburn limits her activity. The heartburn changes with position. The symptoms are aggravated by certain foods (all foods ). Associated symptoms include fatigue. She has tried a PPI, a diet change, an antacid and a histamine-2 antagonist (prilosec, MOM, tums, pepcid, zantac) for the symptoms. The treatment provided mild relief. Past procedures include an abdominal ultrasound and an EGD. Stent in CBD removed in 2012.   Plan; we'll schedule patient for EGD due to  dysphagia, reflux, nausea vomiting and abdominal pain.  We'll start patient on PPI daily.  Follow-up after test return to office sooner if needed.           The following portions of the patient's history were reviewed and updated as appropriate:   Medical History        Past Medical History:   Diagnosis Date   • Acid reflux     • Allergic     • Anxiety     • Depression     • Gallbladder abscess     • Migraines     • Sinusitis     • Stomach ulcer           Surgical History         Past Surgical History:   Procedure Laterality Date   • CHOLECYSTECTOMY       • FINGER NAIL SURGERY       • GALLBLADDER SURGERY       • TONSILLECTOMY AND ADENOIDECTOMY       • WISDOM TOOTH EXTRACTION                    Family History   Problem Relation Age of Onset   • Arthritis Mother     • Diabetes Mother     • Cancer Mother         breat cancer   • Hypertension Mother     • Hypertension Father     • Hypertension Maternal Grandmother     • Heart disease Maternal Grandfather     • Hypertension Maternal Grandfather            OB History      No data available          Current Medications          Current Outpatient Prescriptions   Medication Sig Dispense Refill   • albuterol (PROVENTIL HFA;VENTOLIN HFA) 108 (90 BASE) MCG/ACT inhaler Inhale 2 puffs Every 4 (Four) Hours As Needed for Wheezing. 6.7 g 0   • estradiol cypionate (DEPO-ESTRADIOL) 5 MG/ML injection Inject  into the shoulder, thigh, or buttocks Every 28 (Twenty-Eight) Days.       • pantoprazole (PROTONIX) 40 MG EC tablet Take 1 tablet by mouth Daily. 30 tablet 5      No current facility-administered medications for this visit.               Allergies   Allergen Reactions   • Amiodarone     • Latex     • Nabumetone Nausea Only   • Penicillins Hives   • Sulfa Antibiotics Hives      Social History   Social History           Social History   • Marital status: Single           Social History Main Topics   • Smoking status: Never Smoker   • Smokeless tobacco: Never Used   • Alcohol use No  "  • Drug use: No   • Sexual activity: Defer           Other Topics Concern   • Not on file            Review of Systems  Review of Systems   Constitutional: Positive for fatigue. Negative for chills.   HENT: Positive for hoarse voice and sore throat. Negative for congestion.    Respiratory: Positive for cough (cough up mucousy material ) and choking.    Cardiovascular: Negative for chest pain.   Gastrointestinal: Positive for abdominal pain, heartburn, nausea and vomiting (about 3 times per day depending on intake). Negative for anorexia and change in bowel habit.   Musculoskeletal: Negative for arthralgias and neck pain.   Neurological: Positive for weakness.                    /70   Pulse 84   Ht 165.1 cm (65\")   Wt 111 kg (245 lb 3.2 oz)   BMI 40.80 kg/m²         Objective       Physical Exam   Constitutional: She is oriented to person, place, and time. She appears well-developed and well-nourished. She is cooperative. No distress.   HENT:   Head: Normocephalic and atraumatic.   Neck: Normal range of motion. Neck supple. No thyromegaly present.   Cardiovascular: Normal rate, regular rhythm and normal heart sounds.    Pulmonary/Chest: Effort normal and breath sounds normal. She has no wheezes. She has no rhonchi. She has no rales.   Abdominal: Soft. Normal appearance and bowel sounds are normal. She exhibits no distension. There is no hepatosplenomegaly. There is generalized tenderness. There is no rigidity and no guarding. No hernia.   Lymphadenopathy:     She has no cervical adenopathy.   Neurological: She is alert and oriented to person, place, and time.   Skin: Skin is warm, dry and intact. No rash noted. No pallor.   Psychiatric: She has a normal mood and affect. Her speech is normal.              Admission on 04/10/2018, Discharged on 04/10/2018   Component Date Value Ref Range Status   • Strep A Ag 04/10/2018 Negative  Negative Final   • Monospot 04/10/2018 Negative  Negative Final   • Throat " Culture, Beta Strep 04/13/2018 No Beta Hemolytic Streptococcus Isolated at 2 days    Final         Assessment/Plan          1. Dysphagia, unspecified type    2. Gastroesophageal reflux disease, esophagitis presence not specified    3. Bilious vomiting with nausea    4. Decreased appetite    .         Orders placed during this encounter include:     ESOPHAGOGASTRODUODENOSCOPY possible dilation (N/A)     Review and/or summary of lab tests, radiology, procedures, medications. Review and summary of old records and obtaining of history. The risks and benefits of my recommendations, as well as other treatment options were discussed with the patient today. Questions were answered.          New Medications Ordered This Visit   Medications   • pantoprazole (PROTONIX) 40 MG EC tablet       Sig: Take 1 tablet by mouth Daily.       Dispense:  30 tablet       Refill:  5            This document has been electronically signed by Rafael Garcia MD on May 23, 2018 9:31 AM                  Results for orders placed or performed during the hospital encounter of 04/10/18   Rapid Strep A Screen - Swab, Throat   Result Value Ref Range     Strep A Ag Negative Negative   Mononucleosis Screen   Result Value Ref Range     Monospot Negative Negative   Beta Strep Culture, Throat - Swab, Throat   Result Value Ref Range     Throat Culture, Beta Strep No Beta Hemolytic Streptococcus Isolated at 2 days     Results for orders placed or performed during the hospital encounter of 06/09/17   Gold Top - SST   Result Value Ref Range     Extra Tube Hold for add-ons.     Green Top (Gel)   Result Value Ref Range     Extra Tube Hold for add-ons.     CBC Auto Differential   Result Value Ref Range     WBC 12.83 (H) 3.20 - 9.80 10*3/mm3     RBC 5.23 (H) 3.77 - 5.16 10*6/mm3     Hemoglobin 12.8 12.0 - 15.5 g/dL     Hematocrit 39.4 35.0 - 45.0 %     MCV 75.3 (L) 80.0 - 98.0 fL     MCH 24.5 (L) 26.5 - 34.0 pg     MCHC 32.5 31.4 - 36.0 g/dL     RDW 16.4 (H)  11.5 - 14.5 %     RDW-SD 45.8 36.4 - 46.3 fl     MPV 9.6 8.0 - 12.0 fL     Platelets 408 150 - 450 10*3/mm3     Neutrophil % 70.5 37.0 - 80.0 %     Lymphocyte % 22.0 10.0 - 50.0 %     Monocyte % 5.3 0.0 - 12.0 %     Eosinophil % 1.7 0.0 - 7.0 %     Basophil % 0.2 0.0 - 2.0 %     Immature Grans % 0.3 0.0 - 0.5 %     Neutrophils, Absolute 9.05 (H) 2.00 - 8.60 10*3/mm3     Lymphocytes, Absolute 2.82 0.60 - 4.20 10*3/mm3     Monocytes, Absolute 0.68 0.00 - 0.90 10*3/mm3     Eosinophils, Absolute 0.22 0.00 - 0.70 10*3/mm3     Basophils, Absolute 0.02 0.00 - 0.20 10*3/mm3     Immature Grans, Absolute 0.04 (H) 0.00 - 0.02 10*3/mm3   Lavender Top   Result Value Ref Range     Extra Tube hold for add-on     Light Blue Top   Result Value Ref Range     Extra Tube hold for add-on     Troponin   Result Value Ref Range     Troponin I <0.012 <=0.034 ng/mL   Protime-INR   Result Value Ref Range     Protime 13.7 11.1 - 15.3 Seconds     INR 1.06 0.80 - 1.20   D-dimer, Quantitative   Result Value Ref Range     D-Dimer, Quantitative 403 0 - 470 ng/mL (FEU)   BNP   Result Value Ref Range     proBNP 24.9 0.0 - 450.0 pg/mL   Comprehensive Metabolic Panel   Result Value Ref Range     Glucose 90 60 - 100 mg/dL     BUN 10 7 - 21 mg/dL     Creatinine 0.54 0.50 - 1.00 mg/dL     Sodium 139 137 - 145 mmol/L     Potassium 4.0 3.5 - 5.1 mmol/L     Chloride 104 95 - 110 mmol/L     CO2 24.0 22.0 - 31.0 mmol/L     Calcium 9.2 8.4 - 10.2 mg/dL     Total Protein 7.2 6.3 - 8.6 g/dL     Albumin 4.00 3.40 - 4.80 g/dL     ALT (SGPT) 44 9 - 52 U/L     AST (SGOT) 23 14 - 36 U/L     Alkaline Phosphatase 106 38 - 126 U/L     Total Bilirubin 0.3 0.2 - 1.3 mg/dL     eGFR Non  Amer 135 71 - 165 mL/min/1.73     Globulin 3.2 2.3 - 3.5 gm/dL     A/G Ratio 1.3 1.1 - 1.8 g/dL     BUN/Creatinine Ratio 18.5 7.0 - 25.0     Anion Gap 11.0 5.0 - 15.0 mmol/L   Results for orders placed or performed during the hospital encounter of 11/28/16   Converted Surgical  Pathology   Result Value Ref Range     Spec Descr 1 SPECIMEN(S): A LEFT SHOULDER SHAVINGS     Pregnancy, Urine   Result Value Ref Range     HCG Urine, QL Negative     Results for orders placed or performed during the hospital encounter of 08/28/16   Urinalysis, Microscopic only   Result Value Ref Range     WBC, UA 6-12 (H) NONE SEEN,0-2,3-5  /HPF     RBC, UA 3-5 NONE SEEN,0-2,3-5  /HPF     Epithelial cells 3-5 NONE SEEN,0-2,3-5  /HPF     Bacteria, UA NONE SEEN NONE SEEN   Pregnancy, urine   Result Value Ref Range     HCG Urine, QL Negative     Urinalysis   Result Value Ref Range     Color, UA YELLOW STRAW,YELLOW,DK YELLOW,TONY     Appearance CLEAR CLEAR     Specific Gravity, UA 1.019 1.003 - 1.030     pH, UA 6.0 5.0 - 9.0 pH Units     Leukocytes, UA 2+ (H) NEGATIVE     Nitrite, UA NEGATIVE NEGATIVE     Protein, UA NEGATIVE NEGATIVE     Glucose, Urine NEGATIVE NEGATIVE mg/dl     Ketones, UA NEGATIVE NEGATIVE     Urobilinogen, UA 0.2 0.2 EU/dl     Blood, UA NEGATIVE NEGATIVE   CBC and Differential   Result Value Ref Range     WBC 16.1 (H) 3.2 - 9.8 x1000/uL     RBC 5.14 3.77 - 5.16 susana/mm3     Hemoglobin 13.3 12.0 - 15.5 gm/dl     Hematocrit 41.2 35.0 - 45.0 %     MCV 80.2 80.0 - 98.0 fl     MCH 25.9 (L) 26.0 - 34.0 pg     MCHC 32.3 31.4 - 36.0 gm/dl     RDW 14.3 11.5 - 14.5 %     Platelets 363 150 - 450 x1000/mm3     MPV 8.9 8.0 - 12.0 fl     Neutrophil Rel % 66.4 37.0 - 80.0 %     Lymphocyte Rel % 25.8 10.0 - 50.0 %     Monocyte Rel % 5.6 0.0 - 12.0 %     Eosinophil Rel % 1.6 0.0 - 7.0 %     Basophil Rel % 0.2 0.0 - 2.0 %     Immature Granulocyte Rel % 0.40 0.00 - 0.50 %     Neutrophils Absolute 10.70 (H) 2.00 - 8.60 x1000/uL     Lymphocytes Absolute 4.16 0.60 - 4.20 x1000/uL     Monocytes Absolute 0.91 (H) 0.00 - 0.90 x1000/uL     Eosinophils Absolute 0.25 0.00 - 0.70 x1000/uL     Basophils Absolute 0.03 0.00 - 0.20 x1000/uL     Immature Granulocytes Absolute 0.070 (H) 0.005 - 0.022 x1000/uL   Lipase   Result  Value Ref Range     Lipase 55 23 - 300 U/L   Amylase   Result Value Ref Range     Amylase 61 50 - 130 U/L   Comprehensive metabolic panel   Result Value Ref Range     Sodium 140 137 - 145 mmol/L     Potassium 4.0 3.5 - 5.1 mmol/L     Chloride 103 95 - 110 mmol/L     CO2 26 22 - 31 mmol/L     Anion Gap 11.0 5.0 - 15.0 mmol/L     Glucose 95 60 - 100 mg/dl     BUN 10 7 - 21 mg/dl     Creatinine 0.6 0.5 - 1.0 mg/dl     GFR MDRD Non  121 71 - 165 mL/min/1.73 sq.M     GFR MDRD  146 71 - 165 mL/min/1.73 sq.M     Calcium 8.6 8.4 - 10.2 mg/dl     Total Protein 8.1 6.3 - 8.6 gm/dl     Albumin 4.1 3.4 - 4.8 gm/dl     Total Bilirubin 0.5 0.2 - 1.3 mg/dl     Alkaline Phosphatase 88 38 - 126 U/L     AST (SGOT) 31 14 - 36 U/L     ALT (SGPT) 35 9 - 52 U/L      *Note: Due to a large number of results and/or encounters for the requested time period, some results have not been displayed. A complete set of results can be found in Results Review.              Office Visit on 4/20/2018            Detailed Report

## 2018-05-24 LAB
LAB AP CASE REPORT: NORMAL
LAB AP DIAGNOSIS COMMENT: NORMAL
Lab: NORMAL
PATH REPORT.FINAL DX SPEC: NORMAL
PATH REPORT.GROSS SPEC: NORMAL

## 2018-05-31 ENCOUNTER — OFFICE VISIT (OUTPATIENT)
Dept: GASTROENTEROLOGY | Facility: CLINIC | Age: 28
End: 2018-05-31

## 2018-05-31 VITALS
WEIGHT: 243.6 LBS | BODY MASS INDEX: 40.59 KG/M2 | HEART RATE: 94 BPM | HEIGHT: 65 IN | DIASTOLIC BLOOD PRESSURE: 78 MMHG | SYSTOLIC BLOOD PRESSURE: 116 MMHG

## 2018-05-31 DIAGNOSIS — K22.2 STRICTURE AND STENOSIS OF ESOPHAGUS: Primary | ICD-10-CM

## 2018-05-31 DIAGNOSIS — K21.00 GASTROESOPHAGEAL REFLUX DISEASE WITH ESOPHAGITIS: ICD-10-CM

## 2018-05-31 DIAGNOSIS — K29.50 CHRONIC GASTRITIS WITHOUT BLEEDING, UNSPECIFIED GASTRITIS TYPE: ICD-10-CM

## 2018-05-31 PROCEDURE — 99213 OFFICE O/P EST LOW 20 MIN: CPT | Performed by: NURSE PRACTITIONER

## 2018-05-31 NOTE — PROGRESS NOTES
Chief Complaint   Patient presents with   • Difficulty Swallowing   • EGD     results       Subjective    Adri Stuart is a 28 y.o. female. she is being seen for ollow-up.  28-year-old female presents for follow-up after EGD.  Initially seen due to dysphagia and worsening reflux.  EGD was completed 5/23/18 noted a stricture which was dilated to 54 Cymraes, gastritis in the antrum and duodenum appeared normal.  Biopsies of stomach noted chronic gastritis, negative for H. pylori.  Patient reports significant improvement in symptoms and EGD no further dysphagia, early satiety and reports only rare reflux well controlled with Protonix daily.  Denies any changes in her bowel habits or blood within her stool.  Her weight is stable.  Plan; have instructed patient to avoid gastric irritants and standard antireflux measures.  Continue Protonix daily which she states she will obtain from her primary care provider.  Follow-up in GI office esophagus symptoms of dysphagia recur or worsen.    Heartburn   She complains of heartburn (very rare ). She reports no abdominal pain, no belching, no chest pain, no choking, no coughing, no dysphagia (resolved ), no early satiety, no globus sensation, no hoarse voice or no nausea. This is a chronic problem. The current episode started more than 1 year ago (Started 1-2 years ago recently worsened ). The problem has been rapidly improving. The heartburn wakes her from sleep. The heartburn limits her activity. The heartburn changes with position. The symptoms are aggravated by certain foods (all foods ). She has tried a PPI, a diet change, an antacid and a histamine-2 antagonist (prilosec, MOM, tums, pepcid, zantac) for the symptoms. The treatment provided mild relief. Past procedures include an abdominal ultrasound and an EGD. Stent in CBD removed in 2012.   Plan; we'll schedule patient for EGD due to dysphagia, reflux, nausea vomiting and abdominal pain.  We'll start patient on PPI  daily.  Follow-up after test return to office sooner if needed.         The following portions of the patient's history were reviewed and updated as appropriate:   Past Medical History:   Diagnosis Date   • Acid reflux    • Allergic    • Anxiety    • Depression    • Gallbladder abscess    • Migraines    • Sinusitis    • Stomach ulcer      Past Surgical History:   Procedure Laterality Date   • CHOLECYSTECTOMY     • ENDOSCOPY N/A 5/23/2018    Procedure: ESOPHAGOGASTRODUODENOSCOPY possible dilation;  Surgeon: Rafael Garcia MD;  Location: [facility] ENDOSCOPY;  Service: Gastroenterology   • FINGER NAIL SURGERY     • GALLBLADDER SURGERY     • TONSILLECTOMY AND ADENOIDECTOMY     • WISDOM TOOTH EXTRACTION       Family History   Problem Relation Age of Onset   • Arthritis Mother    • Diabetes Mother    • Cancer Mother         breat cancer   • Hypertension Mother    • Hypertension Father    • Hypertension Maternal Grandmother    • Heart disease Maternal Grandfather    • Hypertension Maternal Grandfather      OB History     No data available        Current Outpatient Prescriptions   Medication Sig Dispense Refill   • albuterol (PROVENTIL HFA;VENTOLIN HFA) 108 (90 BASE) MCG/ACT inhaler Inhale 2 puffs Every 4 (Four) Hours As Needed for Wheezing. 6.7 g 0   • estradiol cypionate (DEPO-ESTRADIOL) 5 MG/ML injection Inject  into the shoulder, thigh, or buttocks Every 28 (Twenty-Eight) Days.     • pantoprazole (PROTONIX) 40 MG EC tablet Take 1 tablet by mouth Daily. 30 tablet 5     No current facility-administered medications for this visit.      Allergies   Allergen Reactions   • Amiodarone    • Latex    • Nabumetone Nausea Only   • Penicillins Hives   • Sulfa Antibiotics Hives     Social History     Social History   • Marital status: Single     Social History Main Topics   • Smoking status: Never Smoker   • Smokeless tobacco: Never Used   • Alcohol use No   • Drug use: No   • Sexual activity: Defer     Other Topics Concern   • Not on  "file       Review of Systems  Review of Systems   Constitutional: Negative for activity change, appetite change and unexpected weight change.   HENT: Negative for hoarse voice and trouble swallowing.    Respiratory: Negative for cough, choking and shortness of breath.    Cardiovascular: Negative for chest pain.   Gastrointestinal: Positive for heartburn (very rare ). Negative for abdominal distention, abdominal pain, anal bleeding, blood in stool, constipation, diarrhea, dysphagia (resolved ), nausea and rectal pain.   Skin: Negative for pallor.   Neurological: Negative for light-headedness.        /78   Pulse 94   Ht 165.1 cm (65\")   Wt 110 kg (243 lb 9.6 oz)   BMI 40.54 kg/m²     Objective    Physical Exam   Constitutional: She is oriented to person, place, and time. She appears well-developed and well-nourished. She is cooperative. No distress.   HENT:   Head: Normocephalic and atraumatic.   Neck: Normal range of motion. Neck supple. No thyromegaly present.   Cardiovascular: Normal rate, regular rhythm and normal heart sounds.    Pulmonary/Chest: Effort normal and breath sounds normal. She has no wheezes. She has no rhonchi. She has no rales.   Abdominal: Soft. Normal appearance and bowel sounds are normal. She exhibits no distension. There is no hepatosplenomegaly. There is no tenderness. There is no rigidity and no guarding. No hernia.   Lymphadenopathy:     She has no cervical adenopathy.   Neurological: She is alert and oriented to person, place, and time.   Skin: Skin is warm, dry and intact. No rash noted. No pallor.   Psychiatric: She has a normal mood and affect. Her speech is normal.     Admission on 05/23/2018, Discharged on 05/23/2018   Component Date Value Ref Range Status   • HCG, Urine QL 05/23/2018 Negative  Negative Final   • Case Report 05/23/2018    Final                    Value:Surgical Pathology Report                         Case: [id number]                                "   Authorizing Provider:  Rafael Garcia MD         Collected:           05/23/2018 10:42 AM          Ordering Location:     [facility]             Received:            05/23/2018 10:52 AM                                 [place] [facility]                                                     Pathologist:           Raoul Glasgow MD                                                          Specimen:    Gastric, Antrum                                                                           • Final Diagnosis 05/23/2018    Final                    Value:This result contains rich text formatting which cannot be displayed here.   • Comment 05/23/2018    Final                    Value:This result contains rich text formatting which cannot be displayed here.   • Gross Description 05/23/2018    Final                    Value:This result contains rich text formatting which cannot be displayed here.     Assessment/Plan      1. Stricture and stenosis of esophagus    2. Gastroesophageal reflux disease with esophagitis    3. Chronic gastritis without bleeding, unspecified gastritis type    .       Orders placed during this encounter include:    * Surgery not found *    Review and/or summary of lab tests, radiology, procedures, medications. Review and summary of old records and obtaining of history. The risks and benefits of my recommendations, as well as other treatment options were discussed with the patient today. Questions were answered.    No orders of the defined types were placed in this encounter.      Follow-up: Return if symptoms worsen or fail to improve.          This document has been electronically signed by MARIA GUADALUPE Heredia on May 31, 2018 9:46 AM             Results for orders placed or performed during the hospital encounter of 05/23/18   Tissue Pathology Exam   Result Value Ref Range    Case Report       Surgical Pathology Report                         Case: [id number]                                "   Authorizing Provider:  Rafael Garcia MD         Collected:           05/23/2018 10:42 AM          Ordering Location:     [facility]             Received:            05/23/2018 10:52 AM                                 [place] [facility]                                                     Pathologist:           Raoul Glasgow MD                                                          Specimen:    Gastric, Antrum                                                                            Final Diagnosis       MUCOSA, ANTRUM OF STOMACH:  CHRONIC GASTRITIS.  NEGATIVE FOR HELICOBACTER PYLORI (HP IMMUNOSTAIN).      Comment       Helicobacter pylori (HP) immunostain is performed because an appropriate inflammatory milieu is present and organisms are not seen on H & E stained slides.    HP immunostain was developed and its performance characteristics determined by [facility].  It has not been cleared or approved by the U.S. Food and Drug Administration.  The FDA has determined that such clearance or approval is not necessary.  This test is used for clinical purposes.  It should not be regarded as investigational or for research.  This laboratory is certified under the Clinical Laboratory Improvement Amendments of 1988 (CLIA-88) as qualified to perform high complexity clinical laboratory testing.          Gross Description       The container is labeled \"antrum of stomach\" and has nodular bits of white soft tissue measuring 0.4 cc in aggregate.  The entire specimen is embedded as 1A.      Embedded Images     Pregnancy, Urine - Urine, Clean Catch   Result Value Ref Range    HCG, Urine QL Negative Negative   Results for orders placed or performed during the hospital encounter of 04/10/18   Rapid Strep A Screen - Swab, Throat   Result Value Ref Range    Strep A Ag Negative Negative   Mononucleosis Screen   Result Value Ref Range    Monospot Negative Negative   Beta Strep " Culture, Throat - Swab, Throat   Result Value Ref Range    Throat Culture, Beta Strep No Beta Hemolytic Streptococcus Isolated at 2 days    Results for orders placed or performed during the hospital encounter of 06/09/17   Gold Top - SST   Result Value Ref Range    Extra Tube Hold for add-ons.    Green Top (Gel)   Result Value Ref Range    Extra Tube Hold for add-ons.    CBC Auto Differential   Result Value Ref Range    WBC 12.83 (H) 3.20 - 9.80 10*3/mm3    RBC 5.23 (H) 3.77 - 5.16 10*6/mm3    Hemoglobin 12.8 12.0 - 15.5 g/dL    Hematocrit 39.4 35.0 - 45.0 %    MCV 75.3 (L) 80.0 - 98.0 fL    MCH 24.5 (L) 26.5 - 34.0 pg    MCHC 32.5 31.4 - 36.0 g/dL    RDW 16.4 (H) 11.5 - 14.5 %    RDW-SD 45.8 36.4 - 46.3 fl    MPV 9.6 8.0 - 12.0 fL    Platelets 408 150 - 450 10*3/mm3    Neutrophil % 70.5 37.0 - 80.0 %    Lymphocyte % 22.0 10.0 - 50.0 %    Monocyte % 5.3 0.0 - 12.0 %    Eosinophil % 1.7 0.0 - 7.0 %    Basophil % 0.2 0.0 - 2.0 %    Immature Grans % 0.3 0.0 - 0.5 %    Neutrophils, Absolute 9.05 (H) 2.00 - 8.60 10*3/mm3    Lymphocytes, Absolute 2.82 0.60 - 4.20 10*3/mm3    Monocytes, Absolute 0.68 0.00 - 0.90 10*3/mm3    Eosinophils, Absolute 0.22 0.00 - 0.70 10*3/mm3    Basophils, Absolute 0.02 0.00 - 0.20 10*3/mm3    Immature Grans, Absolute 0.04 (H) 0.00 - 0.02 10*3/mm3   Lavender Top   Result Value Ref Range    Extra Tube hold for add-on    Light Blue Top   Result Value Ref Range    Extra Tube hold for add-on    Troponin   Result Value Ref Range    Troponin I <0.012 <=0.034 ng/mL   Protime-INR   Result Value Ref Range    Protime 13.7 11.1 - 15.3 Seconds    INR 1.06 0.80 - 1.20   D-dimer, Quantitative   Result Value Ref Range    D-Dimer, Quantitative 403 0 - 470 ng/mL (FEU)   BNP   Result Value Ref Range    proBNP 24.9 0.0 - 450.0 pg/mL   Comprehensive Metabolic Panel   Result Value Ref Range    Glucose 90 60 - 100 mg/dL    BUN 10 7 - 21 mg/dL    Creatinine 0.54 0.50 - 1.00 mg/dL    Sodium 139 137 - 145 mmol/L     Potassium 4.0 3.5 - 5.1 mmol/L    Chloride 104 95 - 110 mmol/L    CO2 24.0 22.0 - 31.0 mmol/L    Calcium 9.2 8.4 - 10.2 mg/dL    Total Protein 7.2 6.3 - 8.6 g/dL    Albumin 4.00 3.40 - 4.80 g/dL    ALT (SGPT) 44 9 - 52 U/L    AST (SGOT) 23 14 - 36 U/L    Alkaline Phosphatase 106 38 - 126 U/L    Total Bilirubin 0.3 0.2 - 1.3 mg/dL    eGFR Non  Amer 135 71 - 165 mL/min/1.73    Globulin 3.2 2.3 - 3.5 gm/dL    A/G Ratio 1.3 1.1 - 1.8 g/dL    BUN/Creatinine Ratio 18.5 7.0 - 25.0    Anion Gap 11.0 5.0 - 15.0 mmol/L   Results for orders placed or performed during the hospital encounter of 11/28/16   Converted Surgical Pathology   Result Value Ref Range    Spec Descr 1 SPECIMEN(S): A LEFT SHOULDER SHAVINGS    Pregnancy, Urine   Result Value Ref Range    HCG Urine, QL Negative    Results for orders placed or performed during the hospital encounter of 08/28/16   Urinalysis, Microscopic only   Result Value Ref Range    WBC, UA 6-12 (H) NONE SEEN,0-2,3-5  /HPF    RBC, UA 3-5 NONE SEEN,0-2,3-5  /HPF    Epithelial cells 3-5 NONE SEEN,0-2,3-5  /HPF    Bacteria, UA NONE SEEN NONE SEEN   Pregnancy, urine   Result Value Ref Range    HCG Urine, QL Negative    Urinalysis   Result Value Ref Range    Color, UA YELLOW STRAW,YELLOW,DK YELLOW,TONY    Appearance CLEAR CLEAR    Specific Gravity, UA 1.019 1.003 - 1.030    pH, UA 6.0 5.0 - 9.0 pH Units    Leukocytes, UA 2+ (H) NEGATIVE    Nitrite, UA NEGATIVE NEGATIVE    Protein, UA NEGATIVE NEGATIVE    Glucose, Urine NEGATIVE NEGATIVE mg/dl    Ketones, UA NEGATIVE NEGATIVE    Urobilinogen, UA 0.2 0.2 EU/dl    Blood, UA NEGATIVE NEGATIVE   CBC and Differential   Result Value Ref Range    WBC 16.1 (H) 3.2 - 9.8 x1000/uL    RBC 5.14 3.77 - 5.16 susana/mm3    Hemoglobin 13.3 12.0 - 15.5 gm/dl    Hematocrit 41.2 35.0 - 45.0 %    MCV 80.2 80.0 - 98.0 fl    MCH 25.9 (L) 26.0 - 34.0 pg    MCHC 32.3 31.4 - 36.0 gm/dl    RDW 14.3 11.5 - 14.5 %    Platelets 363 150 - 450 x1000/mm3    MPV 8.9 8.0 -  12.0 fl    Neutrophil Rel % 66.4 37.0 - 80.0 %    Lymphocyte Rel % 25.8 10.0 - 50.0 %    Monocyte Rel % 5.6 0.0 - 12.0 %    Eosinophil Rel % 1.6 0.0 - 7.0 %    Basophil Rel % 0.2 0.0 - 2.0 %    Immature Granulocyte Rel % 0.40 0.00 - 0.50 %    Neutrophils Absolute 10.70 (H) 2.00 - 8.60 x1000/uL    Lymphocytes Absolute 4.16 0.60 - 4.20 x1000/uL    Monocytes Absolute 0.91 (H) 0.00 - 0.90 x1000/uL    Eosinophils Absolute 0.25 0.00 - 0.70 x1000/uL    Basophils Absolute 0.03 0.00 - 0.20 x1000/uL    Immature Granulocytes Absolute 0.070 (H) 0.005 - 0.022 x1000/uL     *Note: Due to a large number of results and/or encounters for the requested time period, some results have not been displayed. A complete set of results can be found in Results Review.

## 2018-05-31 NOTE — PATIENT INSTRUCTIONS
Gastroesophageal Reflux Disease, Adult  Normally, food travels down the esophagus and stays in the stomach to be digested. If a person has gastroesophageal reflux disease (GERD), food and stomach acid move back up into the esophagus. When this happens, the esophagus becomes sore and swollen (inflamed). Over time, GERD can make small holes (ulcers) in the lining of the esophagus.  Follow these instructions at home:  Diet   Follow a diet as told by your doctor. You may need to avoid foods and drinks such as:  Coffee and tea (with or without caffeine).  Drinks that contain alcohol.  Energy drinks and sports drinks.  Carbonated drinks or sodas.  Chocolate and cocoa.  Peppermint and mint flavorings.  Garlic and onions.  Horseradish.  Spicy and acidic foods, such as peppers, chili powder, chung powder, vinegar, hot sauces, and BBQ sauce.  Citrus fruit juices and citrus fruits, such as oranges, cynthia, and limes.  Tomato-based foods, such as red sauce, chili, salsa, and pizza with red sauce.  Fried and fatty foods, such as donuts, french fries, potato chips, and high-fat dressings.  High-fat meats, such as hot dogs, rib eye steak, sausage, ham, and browne.  High-fat dairy items, such as whole milk, butter, and cream cheese.  Eat small meals often. Avoid eating large meals.  Avoid drinking large amounts of liquid with your meals.  Avoid eating meals during the 2-3 hours before bedtime.  Avoid lying down right after you eat.  Do not exercise right after you eat.  General instructions   Pay attention to any changes in your symptoms.  Take over-the-counter and prescription medicines only as told by your doctor. Do not take aspirin, ibuprofen, or other NSAIDs unless your doctor says it is okay.  Do not use any tobacco products, including cigarettes, chewing tobacco, and e-cigarettes. If you need help quitting, ask your doctor.  Wear loose clothes. Do not wear anything tight around your waist.  Raise (elevate) the head of your  bed about 6 inches (15 cm).  Try to lower your stress. If you need help doing this, ask your doctor.  If you are overweight, lose an amount of weight that is healthy for you. Ask your doctor about a safe weight loss goal.  Keep all follow-up visits as told by your doctor. This is important.  Contact a doctor if:  You have new symptoms.  You lose weight and you do not know why it is happening.  You have trouble swallowing, or it hurts to swallow.  You have wheezing or a cough that keeps happening.  Your symptoms do not get better with treatment.  You have a hoarse voice.  Get help right away if:  You have pain in your arms, neck, jaw, teeth, or back.  You feel sweaty, dizzy, or light-headed.  You have chest pain or shortness of breath.  You throw up (vomit) and your throw up looks like blood or coffee grounds.  You pass out (faint).  Your poop (stool) is bloody or black.  You cannot swallow, drink, or eat.  This information is not intended to replace advice given to you by your health care provider. Make sure you discuss any questions you have with your health care provider.  Document Released: 06/05/2009 Document Revised: 05/25/2017 Document Reviewed: 04/13/2016  [employer] Interactive Patient Education © 2017 [employer]  Esophageal Stricture  Esophageal stricture is a condition that causes the esophagus to become narrow. The esophagus is the long tube in your throat that carries food and liquid from your mouth to your stomach. Esophageal stricture can make it difficult, painful, or even impossible to swallow. The condition also makes choking more likely.  What are the causes?  Gastroesophageal reflux disease (GERD) is the most common cause of esophageal stricture. In GERD, stomach acid backs up into the esophagus. Over time, this causes scar tissue and leads to narrowing (stricture).  Other causes of esophageal stricture include:  · Scarring from ingesting a harmful substance.  · Damage from medical instruments  used in the esophagus.  · Radiation therapy.  · Cancer.  What increases the risk?  You are at greater risk for esophageal stricture if you have GERD or esophageal cancer.  What are the signs or symptoms?  Signs and symptoms of esophageal stricture include:  · Difficulty swallowing.  · Pain when swallowing.  · Heartburn.  · Vomiting or spitting up (regurgitating) food or liquids.  · Weight loss.  How is this diagnosed?  Your health care provider may suspect esophageal stricture based on your symptoms. A physical exam will also be done. You may need tests to confirm the diagnosis. These can include:  · Upper endoscopy. Your health care provider will insert a flexible tube with a tiny camera on it (endoscope) into your esophagus to check for a stricture. A tissue sample may also be taken to be examined under a microscope (biopsy).  · Esophageal pH monitoring. This test involves collecting acid in the esophagus with a tube to determine how much stomach acid is entering the esophagus.  · Barium swallow test. For this test, you will drink a barium solution that coats the lining of the esophagus. Then you will have an X-ray taken. The barium solution helps to show if there is stricture.  How is this treated?  Treatment for esophageal stricture depends on what is causing your condition and how severe it is. Treatment options include:  · Esophageal dilatation. In this procedure, a health care provider inserts an endoscope or a tool called a dilator into your esophagus to gently stretch it and make the opening wider.  · Stents. In some cases, your health care provider may place a small device (stent) in the esophagus to keep it open.  · Acid-blocking medicines. Taking these helps manage GERD symptoms after an esophageal stricture. This can prevent the stricture from returning.  Follow these instructions at home:  · Do not drink alcohol.  · Do not use any tobacco products, including cigarettes, chewing tobacco, or electronic  cigarettes. If you need help quitting, ask your health care provider.  · Lose weight if you are overweight.  · Wear loose, comfortable clothing.  · Do not eat for 3 hours before bedtime.  · Elevate your head in bed with pillows.  · Do not overeat at meals.  · Do not eat foods that make reflux worse. These include:  ¨ Fatty foods.  ¨ Spicy foods.  ¨ Soda.  ¨ Tomato products.  ¨ Chocolate.  Contact a health care provider if:  · You have problems eating or swallowing.  · You regurgitate food and liquid.  This information is not intended to replace advice given to you by your health care provider. Make sure you discuss any questions you have with your health care provider.  Document Released: 08/28/2007 Document Revised: 05/25/2017 Document Reviewed: 04/29/2015  [employer] Interactive Patient Education © 2017 [employer]

## 2019-01-05 ENCOUNTER — HOSPITAL ENCOUNTER (EMERGENCY)
Facility: HOSPITAL | Age: 29
Discharge: HOME | End: 2019-01-05
Attending: FAMILY MEDICINE | Admitting: FAMILY MEDICINE

## 2019-01-05 ENCOUNTER — APPOINTMENT (OUTPATIENT)
Dept: CT IMAGING | Facility: HOSPITAL | Age: 29
End: 2019-01-05

## 2019-01-05 VITALS
TEMPERATURE: 98.1 F | SYSTOLIC BLOOD PRESSURE: 117 MMHG | DIASTOLIC BLOOD PRESSURE: 61 MMHG | RESPIRATION RATE: 18 BRPM | BODY MASS INDEX: 38.22 KG/M2 | WEIGHT: 237.8 LBS | HEIGHT: 66 IN | OXYGEN SATURATION: 97 % | HEART RATE: 102 BPM

## 2019-01-05 DIAGNOSIS — K52.9 GASTROENTERITIS: ICD-10-CM

## 2019-01-05 DIAGNOSIS — R10.84 GENERALIZED ABDOMINAL PAIN: Primary | ICD-10-CM

## 2019-01-05 LAB
ALBUMIN SERPL-MCNC: 4.8 G/DL (ref 3.4–4.8)
ALBUMIN/GLOB SERPL: 1.5 G/DL (ref 1.1–1.8)
ALP SERPL-CCNC: 104 U/L (ref 38–126)
ALT SERPL W P-5'-P-CCNC: 34 U/L (ref 9–52)
ANION GAP SERPL CALCULATED.3IONS-SCNC: 11 MMOL/L (ref 5–15)
AST SERPL-CCNC: 42 U/L (ref 14–36)
BACTERIA UR QL AUTO: ABNORMAL /HPF
BASOPHILS # BLD AUTO: 0.01 10*3/MM3 (ref 0–0.2)
BASOPHILS NFR BLD AUTO: 0.1 % (ref 0–2)
BILIRUB SERPL-MCNC: 0.4 MG/DL (ref 0.2–1.3)
BILIRUB UR QL STRIP: NEGATIVE
BUN BLD-MCNC: 9 MG/DL (ref 7–21)
BUN/CREAT SERPL: 15 (ref 7–25)
CALCIUM SPEC-SCNC: 9.5 MG/DL (ref 8.4–10.2)
CHLORIDE SERPL-SCNC: 100 MMOL/L (ref 95–110)
CLARITY UR: CLEAR
CO2 SERPL-SCNC: 27 MMOL/L (ref 22–31)
COLOR UR: YELLOW
CREAT BLD-MCNC: 0.6 MG/DL (ref 0.5–1)
DEPRECATED RDW RBC AUTO: 40.1 FL (ref 36.4–46.3)
EOSINOPHIL # BLD AUTO: 0.08 10*3/MM3 (ref 0–0.7)
EOSINOPHIL NFR BLD AUTO: 0.5 % (ref 0–7)
ERYTHROCYTE [DISTWIDTH] IN BLOOD BY AUTOMATED COUNT: 14.2 % (ref 11.5–14.5)
GFR SERPL CREATININE-BSD FRML MDRD: 119 ML/MIN/1.73 (ref 71–165)
GLOBULIN UR ELPH-MCNC: 3.3 GM/DL (ref 2.3–3.5)
GLUCOSE BLD-MCNC: 108 MG/DL (ref 60–100)
GLUCOSE UR STRIP-MCNC: NEGATIVE MG/DL
HCG SERPL QL: NEGATIVE
HCT VFR BLD AUTO: 42.2 % (ref 35–45)
HGB BLD-MCNC: 13.9 G/DL (ref 12–15.5)
HGB UR QL STRIP.AUTO: ABNORMAL
HYALINE CASTS UR QL AUTO: ABNORMAL /LPF
IMM GRANULOCYTES # BLD AUTO: 0.06 10*3/MM3 (ref 0–0.02)
IMM GRANULOCYTES NFR BLD AUTO: 0.4 % (ref 0–0.5)
KETONES UR QL STRIP: NEGATIVE
LEUKOCYTE ESTERASE UR QL STRIP.AUTO: NEGATIVE
LIPASE SERPL-CCNC: 62 U/L (ref 23–300)
LYMPHOCYTES # BLD AUTO: 1.61 10*3/MM3 (ref 0.6–4.2)
LYMPHOCYTES NFR BLD AUTO: 10.2 % (ref 10–50)
MCH RBC QN AUTO: 25.6 PG (ref 26.5–34)
MCHC RBC AUTO-ENTMCNC: 32.9 G/DL (ref 31.4–36)
MCV RBC AUTO: 77.6 FL (ref 80–98)
MONOCYTES # BLD AUTO: 0.46 10*3/MM3 (ref 0–0.9)
MONOCYTES NFR BLD AUTO: 2.9 % (ref 0–12)
NEUTROPHILS # BLD AUTO: 13.6 10*3/MM3 (ref 2–8.6)
NEUTROPHILS NFR BLD AUTO: 85.9 % (ref 37–80)
NITRITE UR QL STRIP: NEGATIVE
PH UR STRIP.AUTO: 6.5 [PH] (ref 5–9)
PLATELET # BLD AUTO: 292 10*3/MM3 (ref 150–450)
PMV BLD AUTO: 8.7 FL (ref 8–12)
POTASSIUM BLD-SCNC: 3.9 MMOL/L (ref 3.5–5.1)
PROT SERPL-MCNC: 8.1 G/DL (ref 6.3–8.6)
PROT UR QL STRIP: NEGATIVE
RBC # BLD AUTO: 5.44 10*6/MM3 (ref 3.77–5.16)
RBC # UR: ABNORMAL /HPF
REF LAB TEST METHOD: ABNORMAL
SODIUM BLD-SCNC: 138 MMOL/L (ref 137–145)
SP GR UR STRIP: 1.02 (ref 1–1.03)
SQUAMOUS #/AREA URNS HPF: ABNORMAL /HPF
UROBILINOGEN UR QL STRIP: ABNORMAL
WBC NRBC COR # BLD: 15.82 10*3/MM3 (ref 3.2–9.8)
WBC UR QL AUTO: ABNORMAL /HPF

## 2019-01-05 PROCEDURE — 96375 TX/PRO/DX INJ NEW DRUG ADDON: CPT

## 2019-01-05 PROCEDURE — 81001 URINALYSIS AUTO W/SCOPE: CPT | Performed by: FAMILY MEDICINE

## 2019-01-05 PROCEDURE — 85025 COMPLETE CBC W/AUTO DIFF WBC: CPT | Performed by: FAMILY MEDICINE

## 2019-01-05 PROCEDURE — 80053 COMPREHEN METABOLIC PANEL: CPT | Performed by: FAMILY MEDICINE

## 2019-01-05 PROCEDURE — 25010000002 MORPHINE PER 10 MG: Performed by: FAMILY MEDICINE

## 2019-01-05 PROCEDURE — 99284 EMERGENCY DEPT VISIT MOD MDM: CPT

## 2019-01-05 PROCEDURE — 96374 THER/PROPH/DIAG INJ IV PUSH: CPT

## 2019-01-05 PROCEDURE — 83690 ASSAY OF LIPASE: CPT | Performed by: FAMILY MEDICINE

## 2019-01-05 PROCEDURE — 84703 CHORIONIC GONADOTROPIN ASSAY: CPT | Performed by: FAMILY MEDICINE

## 2019-01-05 PROCEDURE — 25010000002 METOCLOPRAMIDE PER 10 MG: Performed by: FAMILY MEDICINE

## 2019-01-05 PROCEDURE — 74176 CT ABD & PELVIS W/O CONTRAST: CPT

## 2019-01-05 RX ORDER — SODIUM CHLORIDE 0.9 % (FLUSH) 0.9 %
10 SYRINGE (ML) INJECTION AS NEEDED
Status: DISCONTINUED | OUTPATIENT
Start: 2019-01-05 | End: 2019-01-05 | Stop reason: HOSPADM

## 2019-01-05 RX ORDER — HYDROCODONE BITARTRATE AND ACETAMINOPHEN 7.5; 325 MG/1; MG/1
1 TABLET ORAL EVERY 4 HOURS PRN
Qty: 6 TABLET | Refills: 0 | OUTPATIENT
Start: 2019-01-05 | End: 2020-09-03

## 2019-01-05 RX ORDER — ONDANSETRON 4 MG/1
4 TABLET, ORALLY DISINTEGRATING ORAL 4 TIMES DAILY PRN
Qty: 12 TABLET | Refills: 0 | OUTPATIENT
Start: 2019-01-05 | End: 2020-09-03

## 2019-01-05 RX ORDER — METOCLOPRAMIDE HYDROCHLORIDE 5 MG/ML
10 INJECTION INTRAMUSCULAR; INTRAVENOUS ONCE
Status: COMPLETED | OUTPATIENT
Start: 2019-01-05 | End: 2019-01-05

## 2019-01-05 RX ORDER — ONDANSETRON 4 MG/1
4 TABLET, ORALLY DISINTEGRATING ORAL ONCE
Status: COMPLETED | OUTPATIENT
Start: 2019-01-05 | End: 2019-01-05

## 2019-01-05 RX ADMIN — MORPHINE SULFATE 4 MG: 4 INJECTION INTRAVENOUS at 04:43

## 2019-01-05 RX ADMIN — SODIUM CHLORIDE 1000 ML: 900 INJECTION, SOLUTION INTRAVENOUS at 04:38

## 2019-01-05 RX ADMIN — ONDANSETRON 4 MG: 4 TABLET, ORALLY DISINTEGRATING ORAL at 01:55

## 2019-01-05 RX ADMIN — METOCLOPRAMIDE 10 MG: 5 INJECTION, SOLUTION INTRAMUSCULAR; INTRAVENOUS at 04:39

## 2019-01-05 NOTE — ED NOTES
Patient tolerated ginger ale. She states no nausea no vomiting.      Ethel Estevez RN  01/05/19 0604

## 2019-01-05 NOTE — ED PROVIDER NOTES
Subjective   28-year-old white female presents to the emergency Department with complaints of nausea for 3 days that has gotten progressively worse.  At about 10 PM, 5 hours ago, she started having abdominal pain, nausea, and vomiting.  She has thrown up several times.  She started having diarrhea about that time as well.  She denies any recent fever.  She is able to hold down any food or fluid.  She was given Reglan on presentation to the emergency department and states that she has not vomited since then but she is still having nausea and abdominal pain.  She rates her pain as 8 out of 10.            Review of Systems   Constitutional: Negative for activity change, appetite change, chills, fatigue, fever and unexpected weight change.   HENT: Negative for nosebleeds, rhinorrhea, sore throat, trouble swallowing and voice change.    Eyes: Negative for photophobia, pain and visual disturbance.   Respiratory: Negative for apnea, cough, chest tightness, shortness of breath, wheezing and stridor.    Cardiovascular: Negative for chest pain, palpitations and leg swelling.   Gastrointestinal: Positive for diarrhea, nausea and vomiting. Negative for abdominal distention, abdominal pain, blood in stool and constipation.   Endocrine: Negative for cold intolerance, heat intolerance, polydipsia and polyuria.   Genitourinary: Negative for decreased urine volume, difficulty urinating, dysuria, flank pain, hematuria and urgency.   Musculoskeletal: Negative for arthralgias, myalgias, neck pain and neck stiffness.   Skin: Negative for color change, pallor and rash.   Allergic/Immunologic: Negative for immunocompromised state.   Neurological: Negative for dizziness, seizures, syncope, weakness, light-headedness and numbness.   Hematological: Negative for adenopathy.   Psychiatric/Behavioral: Negative for agitation, confusion, dysphoric mood and suicidal ideas. The patient is not nervous/anxious.        Past Medical History:    Diagnosis Date   • Acid reflux    • Allergic    • Anxiety    • Depression    • Gallbladder abscess    • Migraines    • Sinusitis    • Stomach ulcer        Allergies   Allergen Reactions   • Amiodarone    • Latex    • Nabumetone Nausea Only   • Penicillins Hives   • Sulfa Antibiotics Hives       Past Surgical History:   Procedure Laterality Date   • CHOLECYSTECTOMY     • ENDOSCOPY N/A 5/23/2018    Procedure: ESOPHAGOGASTRODUODENOSCOPY possible dilation;  Surgeon: Rafael Garcia MD;  Location: [facility] ENDOSCOPY;  Service: Gastroenterology   • FINGER NAIL SURGERY     • GALLBLADDER SURGERY     • TONSILLECTOMY AND ADENOIDECTOMY     • WISDOM TOOTH EXTRACTION         Family History   Problem Relation Age of Onset   • Arthritis Mother    • Diabetes Mother    • Cancer Mother         breat cancer   • Hypertension Mother    • Hypertension Father    • Hypertension Maternal Grandmother    • Heart disease Maternal Grandfather    • Hypertension Maternal Grandfather        Social History     Socioeconomic History   • Marital status: Single     Spouse name: Not on file   • Number of children: Not on file   • Years of education: Not on file   • Highest education level: Not on file   Tobacco Use   • Smoking status: Never Smoker   • Smokeless tobacco: Never Used   Substance and Sexual Activity   • Alcohol use: No   • Drug use: No   • Sexual activity: Defer     Birth control/protection: Injection           Objective   Physical Exam   Constitutional: She is oriented to person, place, and time. She appears well-developed and well-nourished. No distress.   HENT:   Head: Normocephalic and atraumatic.   Right Ear: External ear normal.   Left Ear: External ear normal.   Mouth/Throat: Oropharynx is clear and moist. No oropharyngeal exudate.   Eyes: Conjunctivae and EOM are normal. Pupils are equal, round, and reactive to light. Right eye exhibits no discharge. Left eye exhibits no discharge. No scleral icterus.   Neck: Neck supple. No JVD  present. No tracheal deviation present. No thyromegaly present.   Cardiovascular: Normal rate, regular rhythm, normal heart sounds and intact distal pulses. Exam reveals no friction rub.   No murmur heard.  Pulmonary/Chest: Effort normal and breath sounds normal. No stridor. No respiratory distress. She has no wheezes. She has no rales. She exhibits no tenderness.   Abdominal: Soft. Bowel sounds are normal. She exhibits no distension and no mass. There is tenderness (Diffusely TTP but increased in the LUQ and epigastrium). There is no rebound and no guarding.   Musculoskeletal: She exhibits no edema, tenderness or deformity.   Lymphadenopathy:     She has no cervical adenopathy.   Neurological: She is alert and oriented to person, place, and time. No cranial nerve deficit. She exhibits normal muscle tone. Coordination normal.   Skin: Skin is warm and dry. Capillary refill takes 2 to 3 seconds. No rash noted. She is not diaphoretic. No erythema.   Psychiatric: She has a normal mood and affect. Her behavior is normal. Judgment and thought content normal.   Nursing note and vitals reviewed.      Procedures           ED Course  ED Course as of Jan 05 0532   Sat Jan 05, 2019 0526 Patient was placed in room 17 and evaluated by me.  Her physical exam was significant for abdominal tenderness.  Labs were obtained and showed a white count of 15,000.  Urine was concerning.  Her chemistries were normal with normal renal function.  She was given morphine and Zofran and Reglan for symptom control in the emergency department.  CT abdomen and pelvis was performed and unremarkable for acute findings.  This appears to be a gastroenteritis and her symptoms will be treated as an outpatient.  [CE]      ED Course User Index  [CE] Lucius Renteria, DO        Labs Reviewed   COMPREHENSIVE METABOLIC PANEL - Abnormal; Notable for the following components:       Result Value    Glucose 108 (*)     AST (SGOT) 42 (*)     All other  components within normal limits   CBC WITH AUTO DIFFERENTIAL - Abnormal; Notable for the following components:    WBC 15.82 (*)     RBC 5.44 (*)     MCV 77.6 (*)     MCH 25.6 (*)     Neutrophil % 85.9 (*)     Neutrophils, Absolute 13.60 (*)     Immature Grans, Absolute 0.06 (*)     All other components within normal limits   URINALYSIS W/ MICROSCOPIC IF INDICATED (NO CULTURE) - Abnormal; Notable for the following components:    Blood, UA Moderate (2+) (*)     All other components within normal limits   URINALYSIS, MICROSCOPIC ONLY - Abnormal; Notable for the following components:    RBC, UA 6-12 (*)     Bacteria, UA 1+ (*)     All other components within normal limits   HCG, SERUM, QUALITATIVE - Normal   LIPASE - Normal   CBC AND DIFFERENTIAL    Narrative:     The following orders were created for panel order CBC & Differential.  Procedure                               Abnormality         Status                     ---------                               -----------         ------                     CBC Auto Differential[[id number]]        Abnormal            Final result                 Please view results for these tests on the individual orders.     Ct Abdomen Pelvis Without Contrast    Result Date: 1/5/2019  Narrative: CT abdomen and pelvis without contrast on 1/5/2019 CLINICAL INDICATION: Generalized abdominal pain TECHNIQUE: Multiple axial images are obtained throughout the abdomen and pelvis without the administration of contrast. This exam was performed according to our departmental dose-optimization program, which includes automated exposure control, adjustment of the mA and/or kV according to patient size and/or use of iterative reconstruction technique. Total DLP is 697.8 mGy*cm. COMPARISON: 8/28/2016 FINDINGS: Abdomen: There is minimal basilar atelectasis. The lung bases are otherwise clear. The patient is status post cholecystectomy. There are no renal or ureteral stones and no hydronephrosis. The  unenhanced solid abdominal organs are unremarkable. There is no abdominal adenopathy. There is no free fluid or free air within the abdomen. The abdominal portion of the GI tract is unremarkable. Pelvis: The pelvic portion of the GI tract including the appendix is unremarkable. There is no free fluid in the pelvis. Pelvic organs appear unremarkable by CT. There is no pelvic adenopathy. No bony abnormality is noted.     Impression: Essentially unremarkable unenhanced exam. Electronically signed by:  Donn Mercer  1/5/2019 5:23 AM [facility] Workstation: [id number]            [facility]      Final diagnoses:   Generalized abdominal pain   Gastroenteritis            Lucius Renteria,   01/05/19 0532

## 2019-01-05 NOTE — ED NOTES
Patient states since she received Zofran she has not vomited but still feels nauseous.      Ethel Estevez RN  01/05/19 0239

## 2019-01-05 NOTE — ED NOTES
"Patient states nausea is \"a lot better\". Provided patient with ginger ale for PO challenge per Dr Dexter MORENO.      Ethel Estevez, RN  01/05/19 0537    "

## 2019-01-05 NOTE — ED NOTES
Patient presents to ED with complaint of vomiting. She states it started around 2200. She tried to take Tums at home but she threw them back up.      Ethel Estevez RN  01/05/19 2072

## 2019-03-01 ENCOUNTER — HOSPITAL ENCOUNTER (EMERGENCY)
Facility: HOSPITAL | Age: 29
Discharge: HOME | End: 2019-03-01
Attending: EMERGENCY MEDICINE | Admitting: EMERGENCY MEDICINE

## 2019-03-01 ENCOUNTER — APPOINTMENT (OUTPATIENT)
Dept: GENERAL RADIOLOGY | Facility: HOSPITAL | Age: 29
End: 2019-03-01

## 2019-03-01 ENCOUNTER — APPOINTMENT (OUTPATIENT)
Dept: CT IMAGING | Facility: HOSPITAL | Age: 29
End: 2019-03-01

## 2019-03-01 VITALS
BODY MASS INDEX: 39.57 KG/M2 | OXYGEN SATURATION: 98 % | SYSTOLIC BLOOD PRESSURE: 142 MMHG | RESPIRATION RATE: 20 BRPM | HEIGHT: 65 IN | TEMPERATURE: 98.3 F | DIASTOLIC BLOOD PRESSURE: 62 MMHG | HEART RATE: 122 BPM

## 2019-03-01 DIAGNOSIS — J06.9 UPPER RESPIRATORY TRACT INFECTION, UNSPECIFIED TYPE: ICD-10-CM

## 2019-03-01 DIAGNOSIS — J40 BRONCHITIS: Primary | ICD-10-CM

## 2019-03-01 LAB
ALBUMIN SERPL-MCNC: 4.5 G/DL (ref 3.4–4.8)
ALBUMIN/GLOB SERPL: 1.1 G/DL (ref 1.1–1.8)
ALP SERPL-CCNC: 80 U/L (ref 38–126)
ALT SERPL W P-5'-P-CCNC: 24 U/L (ref 9–52)
ANION GAP SERPL CALCULATED.3IONS-SCNC: 10 MMOL/L (ref 5–15)
AST SERPL-CCNC: 33 U/L (ref 14–36)
BACTERIA UR QL AUTO: ABNORMAL /HPF
BASOPHILS # BLD AUTO: 0.04 10*3/MM3 (ref 0–0.2)
BASOPHILS NFR BLD AUTO: 0.4 % (ref 0–1.5)
BILIRUB SERPL-MCNC: 0.3 MG/DL (ref 0.2–1.3)
BILIRUB UR QL STRIP: NEGATIVE
BUN BLD-MCNC: 8 MG/DL (ref 7–21)
BUN/CREAT SERPL: 14.8 (ref 7–25)
CALCIUM SPEC-SCNC: 9.1 MG/DL (ref 8.4–10.2)
CHLORIDE SERPL-SCNC: 100 MMOL/L (ref 95–110)
CLARITY UR: ABNORMAL
CO2 SERPL-SCNC: 27 MMOL/L (ref 22–31)
COLOR UR: YELLOW
CREAT BLD-MCNC: 0.54 MG/DL (ref 0.5–1)
D-LACTATE SERPL-SCNC: 1.4 MMOL/L (ref 0.5–2)
DEPRECATED RDW RBC AUTO: 41.7 FL (ref 37–54)
EOSINOPHIL # BLD AUTO: 0.12 10*3/MM3 (ref 0–0.4)
EOSINOPHIL NFR BLD AUTO: 1.3 % (ref 0.3–6.2)
ERYTHROCYTE [DISTWIDTH] IN BLOOD BY AUTOMATED COUNT: 14.5 % (ref 12.3–15.4)
FLUAV AG NPH QL: NEGATIVE
FLUBV AG NPH QL IA: NEGATIVE
GFR SERPL CREATININE-BSD FRML MDRD: 134 ML/MIN/1.73 (ref 71–165)
GLOBULIN UR ELPH-MCNC: 4 GM/DL (ref 2.3–3.5)
GLUCOSE BLD-MCNC: 89 MG/DL (ref 60–100)
GLUCOSE UR STRIP-MCNC: NEGATIVE MG/DL
HCT VFR BLD AUTO: 42.6 % (ref 34–46.6)
HGB BLD-MCNC: 13.5 G/DL (ref 12–15.9)
HGB UR QL STRIP.AUTO: NEGATIVE
HYALINE CASTS UR QL AUTO: ABNORMAL /LPF
IMM GRANULOCYTES # BLD AUTO: 0.03 10*3/MM3 (ref 0–0.05)
IMM GRANULOCYTES NFR BLD AUTO: 0.3 % (ref 0–0.5)
KETONES UR QL STRIP: NEGATIVE
LEUKOCYTE ESTERASE UR QL STRIP.AUTO: ABNORMAL
LYMPHOCYTES # BLD AUTO: 1.63 10*3/MM3 (ref 0.7–3.1)
LYMPHOCYTES NFR BLD AUTO: 17.1 % (ref 19.6–45.3)
MCH RBC QN AUTO: 25 PG (ref 26.6–33)
MCHC RBC AUTO-ENTMCNC: 31.7 G/DL (ref 31.5–35.7)
MCV RBC AUTO: 78.9 FL (ref 79–97)
MONOCYTES # BLD AUTO: 0.56 10*3/MM3 (ref 0.1–0.9)
MONOCYTES NFR BLD AUTO: 5.9 % (ref 5–12)
NEUTROPHILS # BLD AUTO: 7.17 10*3/MM3 (ref 1.4–7)
NEUTROPHILS NFR BLD AUTO: 75 % (ref 42.7–76)
NITRITE UR QL STRIP: NEGATIVE
NRBC BLD AUTO-RTO: 0 /100 WBC (ref 0–0)
PH UR STRIP.AUTO: 7 [PH] (ref 5–9)
PLATELET # BLD AUTO: 316 10*3/MM3 (ref 140–450)
PMV BLD AUTO: 9.2 FL (ref 6–12)
POTASSIUM BLD-SCNC: 3.9 MMOL/L (ref 3.5–5.1)
PROT SERPL-MCNC: 8.5 G/DL (ref 6.3–8.6)
PROT UR QL STRIP: NEGATIVE
RBC # BLD AUTO: 5.4 10*6/MM3 (ref 3.77–5.28)
RBC # UR: ABNORMAL /HPF
REF LAB TEST METHOD: ABNORMAL
S PYO AG THROAT QL: NEGATIVE
SODIUM BLD-SCNC: 137 MMOL/L (ref 137–145)
SP GR UR STRIP: 1.02 (ref 1–1.03)
SQUAMOUS #/AREA URNS HPF: ABNORMAL /HPF
TROPONIN I SERPL-MCNC: <0.012 NG/ML
UROBILINOGEN UR QL STRIP: ABNORMAL
WBC NRBC COR # BLD: 9.55 10*3/MM3 (ref 3.4–10.8)
WBC UR QL AUTO: ABNORMAL /HPF
WHOLE BLOOD HOLD SPECIMEN: NORMAL

## 2019-03-01 PROCEDURE — 96360 HYDRATION IV INFUSION INIT: CPT

## 2019-03-01 PROCEDURE — 94799 UNLISTED PULMONARY SVC/PX: CPT

## 2019-03-01 PROCEDURE — 87081 CULTURE SCREEN ONLY: CPT | Performed by: PHYSICIAN ASSISTANT

## 2019-03-01 PROCEDURE — 87880 STREP A ASSAY W/OPTIC: CPT | Performed by: PHYSICIAN ASSISTANT

## 2019-03-01 PROCEDURE — 93005 ELECTROCARDIOGRAM TRACING: CPT | Performed by: PHYSICIAN ASSISTANT

## 2019-03-01 PROCEDURE — 81001 URINALYSIS AUTO W/SCOPE: CPT | Performed by: PHYSICIAN ASSISTANT

## 2019-03-01 PROCEDURE — 99284 EMERGENCY DEPT VISIT MOD MDM: CPT

## 2019-03-01 PROCEDURE — 80053 COMPREHEN METABOLIC PANEL: CPT | Performed by: PHYSICIAN ASSISTANT

## 2019-03-01 PROCEDURE — 83605 ASSAY OF LACTIC ACID: CPT | Performed by: PHYSICIAN ASSISTANT

## 2019-03-01 PROCEDURE — 94640 AIRWAY INHALATION TREATMENT: CPT

## 2019-03-01 PROCEDURE — 84484 ASSAY OF TROPONIN QUANT: CPT | Performed by: PHYSICIAN ASSISTANT

## 2019-03-01 PROCEDURE — 85025 COMPLETE CBC W/AUTO DIFF WBC: CPT | Performed by: PHYSICIAN ASSISTANT

## 2019-03-01 PROCEDURE — 94760 N-INVAS EAR/PLS OXIMETRY 1: CPT

## 2019-03-01 PROCEDURE — 71046 X-RAY EXAM CHEST 2 VIEWS: CPT

## 2019-03-01 PROCEDURE — 93010 ELECTROCARDIOGRAM REPORT: CPT | Performed by: INTERNAL MEDICINE

## 2019-03-01 PROCEDURE — 87804 INFLUENZA ASSAY W/OPTIC: CPT | Performed by: PHYSICIAN ASSISTANT

## 2019-03-01 RX ORDER — DOXYCYCLINE 100 MG/1
100 CAPSULE ORAL 2 TIMES DAILY
Qty: 14 CAPSULE | Refills: 0 | Status: SHIPPED | OUTPATIENT
Start: 2019-03-01 | End: 2019-03-08

## 2019-03-01 RX ORDER — BENZONATATE 200 MG/1
200 CAPSULE ORAL 3 TIMES DAILY PRN
Qty: 21 CAPSULE | Refills: 0 | Status: SHIPPED | OUTPATIENT
Start: 2019-03-01 | End: 2019-03-08

## 2019-03-01 RX ORDER — IPRATROPIUM BROMIDE AND ALBUTEROL SULFATE 2.5; .5 MG/3ML; MG/3ML
3 SOLUTION RESPIRATORY (INHALATION)
Status: DISCONTINUED | OUTPATIENT
Start: 2019-03-01 | End: 2019-03-01 | Stop reason: HOSPADM

## 2019-03-01 RX ADMIN — IPRATROPIUM BROMIDE AND ALBUTEROL SULFATE 3 ML: .5; 3 SOLUTION RESPIRATORY (INHALATION) at 15:29

## 2019-03-01 RX ADMIN — SODIUM CHLORIDE 1000 ML: 900 INJECTION, SOLUTION INTRAVENOUS at 16:32

## 2019-03-01 NOTE — ED PROVIDER NOTES
Subjective   Pt, hx of allergies, pleurisy, and pharyngitis, reports cough, shortness of breath, and chest tightness started 2 days ago, went to  today and advised pt come to the ED due to high BP and decreased in O2 sats. Pt reports she has similar symptoms every year and was dx with pharyngitis and bronchitis.         History provided by:  Patient   used: No    Cough   Cough characteristics:  Productive  Sputum characteristics:  White  Duration:  2 days  Timing:  Constant  Associated symptoms: chest pain, shortness of breath and sore throat    Flu Symptoms   Presenting symptoms: cough, shortness of breath and sore throat    Presenting symptoms: no fatigue and no vomiting    Hypertension   Associated symptoms: chest pain and shortness of breath    Associated symptoms: no fatigue and not vomiting        Review of Systems   Constitutional: Negative for fatigue.   HENT: Positive for sore throat.    Respiratory: Positive for cough and shortness of breath.    Cardiovascular: Positive for chest pain.   Gastrointestinal: Negative for vomiting.   Endocrine: Negative for polyuria.   Musculoskeletal: Negative for arthralgias and back pain.   Skin: Negative for color change.   Neurological: Negative for syncope.   Hematological: Negative for adenopathy.   Psychiatric/Behavioral: Negative for agitation and behavioral problems.   All other systems reviewed and are negative.      Past Medical History:   Diagnosis Date   • Acid reflux    • Allergic    • Anxiety    • Depression    • Gallbladder abscess    • Migraines    • Sinusitis    • Stomach ulcer        Allergies   Allergen Reactions   • Amiodarone    • Latex    • Nabumetone Nausea Only   • Penicillins Hives   • Sulfa Antibiotics Hives       Past Surgical History:   Procedure Laterality Date   • CHOLECYSTECTOMY     • ENDOSCOPY N/A 5/23/2018    Procedure: ESOPHAGOGASTRODUODENOSCOPY possible dilation;  Surgeon: Rafael Garcia MD;  Location: [facility]  ENDOSCOPY;  Service: Gastroenterology   • FINGER NAIL SURGERY     • GALLBLADDER SURGERY     • TONSILLECTOMY AND ADENOIDECTOMY     • WISDOM TOOTH EXTRACTION         Family History   Problem Relation Age of Onset   • Arthritis Mother    • Diabetes Mother    • Cancer Mother         breat cancer   • Hypertension Mother    • Hypertension Father    • Hypertension Maternal Grandmother    • Heart disease Maternal Grandfather    • Hypertension Maternal Grandfather        Social History     Socioeconomic History   • Marital status: Single     Spouse name: Not on file   • Number of children: Not on file   • Years of education: Not on file   • Highest education level: Not on file   Tobacco Use   • Smoking status: Never Smoker   • Smokeless tobacco: Never Used   Substance and Sexual Activity   • Alcohol use: No   • Drug use: No   • Sexual activity: Defer     Birth control/protection: Injection           Objective   Physical Exam   Constitutional: She is oriented to person, place, and time. She appears well-developed and well-nourished.   HENT:   Head: Normocephalic.   Right Ear: Hearing normal.   Left Ear: Hearing normal.   Nose: Nose normal.   Eyes: Conjunctivae, EOM and lids are normal.   Neck: Trachea normal and full passive range of motion without pain.   Cardiovascular: Regular rhythm, S1 normal, S2 normal, normal heart sounds and normal pulses.   Pulmonary/Chest: Effort normal. She has wheezes.   Minimal wheezing   Abdominal: Normal appearance and bowel sounds are normal.   Neurological: She is alert and oriented to person, place, and time. She is not disoriented.   Skin: Skin is warm and dry. She is not diaphoretic.   Psychiatric: She has a normal mood and affect. Her speech is normal and behavior is normal. Thought content normal.   Nursing note and vitals reviewed.      ECG 12 Lead    Date/Time: 3/1/2019 5:28 PM  Performed by: Wanda Pham PA-C  Authorized by: Mil Mcdowell MD   Interpreted by physician  Comparison:  compared with previous ECG from 6/9/2017  Rhythm: sinus tachycardia  Rate: tachycardic  BPM: 130  Clinical impression: abnormal ECG                 Labs Reviewed   COMPREHENSIVE METABOLIC PANEL - Abnormal; Notable for the following components:       Result Value    Globulin 4.0 (*)     All other components within normal limits   URINALYSIS W/ MICROSCOPIC IF INDICATED (NO CULTURE) - Abnormal; Notable for the following components:    Appearance, UA Cloudy (*)     Leuk Esterase, UA Trace (*)     All other components within normal limits   CBC WITH AUTO DIFFERENTIAL - Abnormal; Notable for the following components:    RBC 5.40 (*)     MCV 78.9 (*)     MCH 25.0 (*)     Lymphocyte % 17.1 (*)     Neutrophils, Absolute 7.17 (*)     All other components within normal limits   URINALYSIS, MICROSCOPIC ONLY - Abnormal; Notable for the following components:    RBC, UA 0-2 (*)     WBC, UA 13-20 (*)     Bacteria, UA 2+ (*)     Squamous Epithelial Cells, UA 13-20 (*)     All other components within normal limits   RAPID STREP A SCREEN - Normal   INFLUENZA ANTIGEN, RAPID - Normal   LACTIC ACID, PLASMA - Normal   TROPONIN (IN-HOUSE) - Normal   BETA HEMOLYTIC STREP CULTURE, THROAT   CBC AND DIFFERENTIAL    Narrative:     The following orders were created for panel order CBC & Differential.  Procedure                               Abnormality         Status                     ---------                               -----------         ------                     CBC Auto Differential[[id number]]        Abnormal            Final result                 Please view results for these tests on the individual orders.   EXTRA TUBES    Narrative:     The following orders were created for panel order Extra Tubes.  Procedure                               Abnormality         Status                     ---------                               -----------         ------                     Light Blue Top[[id number]]                                    Final result                 Please view results for these tests on the individual orders.   LIGHT BLUE TOP       XR Chest PA & Lateral   Final Result   CONCLUSION:          1. No evidence of an active cardiopulmonary process.                                                       Electronically signed by:  ANNIE Barton MD  3/1/2019 3:57 PM   [facility] Workstation: [id number]            ED Course      5:53P  Patient's symptoms are more infectious pathology.Cough is worse with deep breaths and when laying down.  Pt reports albuterol inhaler in the ED did not relieve symptoms. Ambulated patient in the ED and O2 sats remained above 97% on RA. Pt reports she gets theses symptoms every year and is usually placed on ABX. Will prescribe ABX and Tessalon. Pt agrees with plan of care and all questions addressed at this time.           HEART Score (for prediction of 6-week risk of major adverse cardiac event) reviewed and/or performed as part of the patient evaluation and treatment planning process.  The result associated with this review/performance is: 0       MDM      Final diagnoses:   Bronchitis   Upper respiratory tract infection, unspecified type            Wanda Pham PA-C  03/02/19 0956

## 2019-03-04 LAB — BACTERIA SPEC AEROBE CULT: NORMAL

## 2019-09-18 ENCOUNTER — TRANSCRIBE ORDERS (OUTPATIENT)
Dept: ADMINISTRATIVE | Facility: HOSPITAL | Age: 29
End: 2019-09-18

## 2019-09-18 DIAGNOSIS — G89.29 CHRONIC NONINTRACTABLE HEADACHE, UNSPECIFIED HEADACHE TYPE: Primary | ICD-10-CM

## 2019-09-18 DIAGNOSIS — R51.9 CHRONIC NONINTRACTABLE HEADACHE, UNSPECIFIED HEADACHE TYPE: Primary | ICD-10-CM

## 2019-09-20 ENCOUNTER — APPOINTMENT (OUTPATIENT)
Dept: CT IMAGING | Facility: HOSPITAL | Age: 29
End: 2019-09-20

## 2019-09-20 ENCOUNTER — HOSPITAL ENCOUNTER (OUTPATIENT)
Dept: CT IMAGING | Facility: HOSPITAL | Age: 29
Discharge: HOME | End: 2019-09-20
Admitting: NURSE PRACTITIONER

## 2019-09-20 DIAGNOSIS — G44.89 OTHER HEADACHE SYNDROME: ICD-10-CM

## 2019-09-20 PROCEDURE — 70450 CT HEAD/BRAIN W/O DYE: CPT

## 2020-11-09 PROCEDURE — U0003 INFECTIOUS AGENT DETECTION BY NUCLEIC ACID (DNA OR RNA); SEVERE ACUTE RESPIRATORY SYNDROME CORONAVIRUS 2 (SARS-COV-2) (CORONAVIRUS DISEASE [COVID-19]), AMPLIFIED PROBE TECHNIQUE, MAKING USE OF HIGH THROUGHPUT TECHNOLOGIES AS DESCRIBED BY CMS-2020-01-R: HCPCS | Performed by: NURSE PRACTITIONER

## 2020-11-12 ENCOUNTER — APPOINTMENT (OUTPATIENT)
Dept: GENERAL RADIOLOGY | Facility: HOSPITAL | Age: 30
End: 2020-11-12

## 2020-11-12 ENCOUNTER — HOSPITAL ENCOUNTER (EMERGENCY)
Facility: HOSPITAL | Age: 30
Discharge: HOME | End: 2020-11-12
Attending: EMERGENCY MEDICINE | Admitting: EMERGENCY MEDICINE

## 2020-11-12 ENCOUNTER — E-VISIT (OUTPATIENT)
Dept: FAMILY MEDICINE CLINIC | Facility: TELEHEALTH | Age: 30
End: 2020-11-12

## 2020-11-12 VITALS
OXYGEN SATURATION: 97 % | BODY MASS INDEX: 44.15 KG/M2 | DIASTOLIC BLOOD PRESSURE: 84 MMHG | TEMPERATURE: 99 F | HEIGHT: 65 IN | SYSTOLIC BLOOD PRESSURE: 153 MMHG | WEIGHT: 265 LBS | RESPIRATION RATE: 18 BRPM | HEART RATE: 106 BPM

## 2020-11-12 DIAGNOSIS — U07.1 COVID-19: Primary | ICD-10-CM

## 2020-11-12 DIAGNOSIS — J06.9 VIRAL URI WITH COUGH: ICD-10-CM

## 2020-11-12 LAB
ALBUMIN SERPL-MCNC: 4.3 G/DL (ref 3.5–5.2)
ALBUMIN/GLOB SERPL: 1.3 G/DL
ALP SERPL-CCNC: 121 U/L (ref 39–117)
ALT SERPL W P-5'-P-CCNC: 32 U/L (ref 1–33)
ANION GAP SERPL CALCULATED.3IONS-SCNC: 12 MMOL/L (ref 5–15)
AST SERPL-CCNC: 19 U/L (ref 1–32)
BASOPHILS # BLD AUTO: 0.02 10*3/MM3 (ref 0–0.2)
BASOPHILS NFR BLD AUTO: 0.2 % (ref 0–1.5)
BILIRUB SERPL-MCNC: 0.2 MG/DL (ref 0–1.2)
BUN SERPL-MCNC: 6 MG/DL (ref 6–20)
BUN/CREAT SERPL: 8.6 (ref 7–25)
CALCIUM SPEC-SCNC: 9.6 MG/DL (ref 8.6–10.5)
CHLORIDE SERPL-SCNC: 102 MMOL/L (ref 98–107)
CO2 SERPL-SCNC: 27 MMOL/L (ref 22–29)
CREAT SERPL-MCNC: 0.7 MG/DL (ref 0.57–1)
D-DIMER, QUANTITATIVE (MAD,POW, STR): 313 NG/ML (FEU) (ref 0–470)
DEPRECATED RDW RBC AUTO: 40 FL (ref 37–54)
EOSINOPHIL # BLD AUTO: 0.27 10*3/MM3 (ref 0–0.4)
EOSINOPHIL NFR BLD AUTO: 2.1 % (ref 0.3–6.2)
ERYTHROCYTE [DISTWIDTH] IN BLOOD BY AUTOMATED COUNT: 14.7 % (ref 12.3–15.4)
GFR SERPL CREATININE-BSD FRML MDRD: 98 ML/MIN/1.73
GLOBULIN UR ELPH-MCNC: 3.4 GM/DL
GLUCOSE SERPL-MCNC: 110 MG/DL (ref 65–99)
HCG SERPL QL: NEGATIVE
HCT VFR BLD AUTO: 44.2 % (ref 34–46.6)
HGB BLD-MCNC: 14 G/DL (ref 12–15.9)
IMM GRANULOCYTES # BLD AUTO: 0.06 10*3/MM3 (ref 0–0.05)
IMM GRANULOCYTES NFR BLD AUTO: 0.5 % (ref 0–0.5)
LYMPHOCYTES # BLD AUTO: 4.27 10*3/MM3 (ref 0.7–3.1)
LYMPHOCYTES NFR BLD AUTO: 32.6 % (ref 19.6–45.3)
MCH RBC QN AUTO: 24.2 PG (ref 26.6–33)
MCHC RBC AUTO-ENTMCNC: 31.7 G/DL (ref 31.5–35.7)
MCV RBC AUTO: 76.3 FL (ref 79–97)
MONOCYTES # BLD AUTO: 0.7 10*3/MM3 (ref 0.1–0.9)
MONOCYTES NFR BLD AUTO: 5.3 % (ref 5–12)
NEUTROPHILS NFR BLD AUTO: 59.3 % (ref 42.7–76)
NEUTROPHILS NFR BLD AUTO: 7.77 10*3/MM3 (ref 1.7–7)
NRBC BLD AUTO-RTO: 0 /100 WBC (ref 0–0.2)
PLATELET # BLD AUTO: 388 10*3/MM3 (ref 140–450)
PMV BLD AUTO: 8.7 FL (ref 6–12)
POTASSIUM SERPL-SCNC: 3.5 MMOL/L (ref 3.5–5.2)
PROT SERPL-MCNC: 7.7 G/DL (ref 6–8.5)
RBC # BLD AUTO: 5.79 10*6/MM3 (ref 3.77–5.28)
SODIUM SERPL-SCNC: 141 MMOL/L (ref 136–145)
WBC # BLD AUTO: 13.09 10*3/MM3 (ref 3.4–10.8)

## 2020-11-12 PROCEDURE — 93010 ELECTROCARDIOGRAM REPORT: CPT | Performed by: INTERNAL MEDICINE

## 2020-11-12 PROCEDURE — 80053 COMPREHEN METABOLIC PANEL: CPT | Performed by: EMERGENCY MEDICINE

## 2020-11-12 PROCEDURE — 99422 OL DIG E/M SVC 11-20 MIN: CPT | Performed by: NURSE PRACTITIONER

## 2020-11-12 PROCEDURE — 93005 ELECTROCARDIOGRAM TRACING: CPT | Performed by: EMERGENCY MEDICINE

## 2020-11-12 PROCEDURE — 71045 X-RAY EXAM CHEST 1 VIEW: CPT

## 2020-11-12 PROCEDURE — 99283 EMERGENCY DEPT VISIT LOW MDM: CPT

## 2020-11-12 PROCEDURE — 25010000002 DEXAMETHASONE PER 1 MG: Performed by: EMERGENCY MEDICINE

## 2020-11-12 PROCEDURE — 96374 THER/PROPH/DIAG INJ IV PUSH: CPT

## 2020-11-12 PROCEDURE — 84703 CHORIONIC GONADOTROPIN ASSAY: CPT | Performed by: EMERGENCY MEDICINE

## 2020-11-12 PROCEDURE — 85379 FIBRIN DEGRADATION QUANT: CPT | Performed by: EMERGENCY MEDICINE

## 2020-11-12 PROCEDURE — 85025 COMPLETE CBC W/AUTO DIFF WBC: CPT | Performed by: EMERGENCY MEDICINE

## 2020-11-12 RX ORDER — GUAIFENESIN 600 MG/1
1200 TABLET, EXTENDED RELEASE ORAL EVERY 12 HOURS SCHEDULED
Status: DISCONTINUED | OUTPATIENT
Start: 2020-11-12 | End: 2020-11-13 | Stop reason: HOSPADM

## 2020-11-12 RX ORDER — DEXAMETHASONE SODIUM PHOSPHATE 4 MG/ML
6 INJECTION, SOLUTION INTRA-ARTICULAR; INTRALESIONAL; INTRAMUSCULAR; INTRAVENOUS; SOFT TISSUE ONCE
Status: COMPLETED | OUTPATIENT
Start: 2020-11-12 | End: 2020-11-12

## 2020-11-12 RX ADMIN — DEXAMETHASONE SODIUM PHOSPHATE 6 MG: 4 INJECTION, SOLUTION INTRAMUSCULAR; INTRAVENOUS at 21:44

## 2020-11-12 RX ADMIN — GUAIFENESIN 1200 MG: 600 TABLET, EXTENDED RELEASE ORAL at 21:22

## 2020-11-13 NOTE — PATIENT INSTRUCTIONS
COVID-19  COVID-19 is a respiratory infection that is caused by a virus called severe acute respiratory syndrome coronavirus 2 (SARS-CoV-2). The disease is also known as coronavirus disease or novel coronavirus. In some people, the virus may not cause any symptoms. In others, it may cause a serious infection. The infection can get worse quickly and can lead to complications, such as:  · Pneumonia, or infection of the lungs.  · Acute respiratory distress syndrome or ARDS. This is fluid build-up in the lungs.  · Acute respiratory failure. This is a condition in which there is not enough oxygen passing from the lungs to the body.  · Sepsis or septic shock. This is a serious bodily reaction to an infection.  · Blood clotting problems.  · Secondary infections due to bacteria or fungus.  The virus that causes COVID-19 is contagious. This means that it can spread from person to person through droplets from coughs and sneezes (respiratory secretions).  What are the causes?  This illness is caused by a virus. You may catch the virus by:  · Breathing in droplets from an infected person's cough or sneeze.  · Touching something, like a table or a doorknob, that was exposed to the virus (contaminated) and then touching your mouth, nose, or eyes.  What increases the risk?  Risk for infection  You are more likely to be infected with this virus if you:  · Live in or travel to an area with a COVID-19 outbreak.  · Come in contact with a sick person who recently traveled to an area with a COVID-19 outbreak.  · Provide care for or live with a person who is infected with COVID-19.  Risk for serious illness  You are more likely to become seriously ill from the virus if you:  · Are 65 years of age or older.  · Have a long-term disease that lowers your body's ability to fight infection (immunocompromised).  · Live in a nursing home or long-term care facility.  · Have a long-term (chronic) disease such as:  ? Chronic lung disease, including  chronic obstructive pulmonary disease or asthma  ? Heart disease.  ? Diabetes.  ? Chronic kidney disease.  ? Liver disease.  · Are obese.  What are the signs or symptoms?  Symptoms of this condition can range from mild to severe. Symptoms may appear any time from 2 to 14 days after being exposed to the virus. They include:  · A fever.  · A cough.  · Difficulty breathing.  · Chills.  · Muscle pains.  · A sore throat.  · Loss of taste or smell.  Some people may also have stomach problems, such as nausea, vomiting, or diarrhea.  Other people may not have any symptoms of COVID-19.  How is this diagnosed?  This condition may be diagnosed based on:  · Your signs and symptoms, especially if:  ? You live in an area with a COVID-19 outbreak.  ? You recently traveled to or from an area where the virus is common.  ? You provide care for or live with a person who was diagnosed with COVID-19.  · A physical exam.  · Lab tests, which may include:  ? A nasal swab to take a sample of fluid from your nose.  ? A throat swab to take a sample of fluid from your throat.  ? A sample of mucus from your lungs (sputum).  ? Blood tests.  · Imaging tests, which may include, X-rays, CT scan, or ultrasound.  How is this treated?  At present, there is no medicine to treat COVID-19. Medicines that treat other diseases are being used on a trial basis to see if they are effective against COVID-19.  Your health care provider will talk with you about ways to treat your symptoms. For most people, the infection is mild and can be managed at home with rest, fluids, and over-the-counter medicines.  Treatment for a serious infection usually takes places in a hospital intensive care unit (ICU). It may include one or more of the following treatments. These treatments are given until your symptoms improve.  · Receiving fluids and medicines through an IV.  · Supplemental oxygen. Extra oxygen is given through a tube in the nose, a face mask, or a  magallon.  · Positioning you to lie on your stomach (prone position). This makes it easier for oxygen to get into the lungs.  · Continuous positive airway pressure (CPAP) or bi-level positive airway pressure (BPAP) machine. This treatment uses mild air pressure to keep the airways open. A tube that is connected to a motor delivers oxygen to the body.  · Ventilator. This treatment moves air into and out of the lungs by using a tube that is placed in your windpipe.  · Tracheostomy. This is a procedure to create a hole in the neck so that a breathing tube can be inserted.  · Extracorporeal membrane oxygenation (ECMO). This procedure gives the lungs a chance to recover by taking over the functions of the heart and lungs. It supplies oxygen to the body and removes carbon dioxide.  Follow these instructions at home:  Lifestyle  · If you are sick, stay home except to get medical care. Your health care provider will tell you how long to stay home. Call your health care provider before you go for medical care.  · Rest at home as told by your health care provider.  · Do not use any products that contain nicotine or tobacco, such as cigarettes, e-cigarettes, and chewing tobacco. If you need help quitting, ask your health care provider.  · Return to your normal activities as told by your health care provider. Ask your health care provider what activities are safe for you.  General instructions  · Take over-the-counter and prescription medicines only as told by your health care provider.  · Drink enough fluid to keep your urine pale yellow.  · Keep all follow-up visits as told by your health care provider. This is important.  How is this prevented?    There is no vaccine to help prevent COVID-19 infection. However, there are steps you can take to protect yourself and others from this virus.  To protect yourself:   · Do not travel to areas where COVID-19 is a risk. The areas where COVID-19 is reported change often. To identify  high-risk areas and travel restrictions, check the [facility] travel website: wwwnc.cdc.gov/travel/notices  · If you live in, or must travel to, an area where COVID-19 is a risk, take precautions to avoid infection.  ? Stay away from people who are sick.  ? Wash your hands often with soap and water for 20 seconds. If soap and water are not available, use an alcohol-based hand .  ? Avoid touching your mouth, face, eyes, or nose.  ? Avoid going out in public, follow guidance from your state and local health authorities.  ? If you must go out in public, wear a cloth face covering or face mask.  ? Disinfect objects and surfaces that are frequently touched every day. This may include:  § Counters and tables.  § Doorknobs and light switches.  § Sinks and faucets.  § Electronics, such as phones, remote controls, keyboards, computers, and tablets.  To protect others:  If you have symptoms of COVID-19, take steps to prevent the virus from spreading to others.  · If you think you have a COVID-19 infection, contact your health care provider right away. Tell your health care team that you think you may have a COVID-19 infection.  · Stay home. Leave your house only to seek medical care. Do not use public transport.  · Do not travel while you are sick.  · Wash your hands often with soap and water for 20 seconds. If soap and water are not available, use alcohol-based hand .  · Stay away from other members of your household. Let healthy household members care for children and pets, if possible. If you have to care for children or pets, wash your hands often and wear a mask. If possible, stay in your own room, separate from others. Use a different bathroom.  · Make sure that all people in your household wash their hands well and often.  · Cough or sneeze into a tissue or your sleeve or elbow. Do not cough or sneeze into your hand or into the air.  · Wear a cloth face covering or face mask.  Where to find more  information  · Centers for Disease Control and Prevention: www.cdc.gov/coronavirus/2019-ncov/index.html  · World Health Organization: www.who.int/health-topics/coronavirus  Contact a health care provider if:  · You live in or have traveled to an area where COVID-19 is a risk and you have symptoms of the infection.  · You have had contact with someone who has COVID-19 and you have symptoms of the infection.  Get help right away if:  · You have trouble breathing.  · You have pain or pressure in your chest.  · You have confusion.  · You have bluish lips and fingernails.  · You have difficulty waking from sleep.  · You have symptoms that get worse.  These symptoms may represent a serious problem that is an emergency. Do not wait to see if the symptoms will go away. Get medical help right away. Call your local emergency services (911 in the [place] Do not drive yourself to the hospital. Let the emergency medical personnel know if you think you have COVID-19.  Summary  · COVID-19 is a respiratory infection that is caused by a virus. It is also known as coronavirus disease or novel coronavirus. It can cause serious infections, such as pneumonia, acute respiratory distress syndrome, acute respiratory failure, or sepsis.  · The virus that causes COVID-19 is contagious. This means that it can spread from person to person through droplets from coughs and sneezes.  · You are more likely to develop a serious illness if you are 65 years of age or older, have a weak immunity, live in a nursing home, or have chronic disease.  · There is no medicine to treat COVID-19. Your health care provider will talk with you about ways to treat your symptoms.  · Take steps to protect yourself and others from infection. Wash your hands often and disinfect objects and surfaces that are frequently touched every day. Stay away from people who are sick and wear a mask if you are sick.  This information is not intended to replace advice given to you by  your health care provider. Make sure you discuss any questions you have with your health care provider.  Document Released: 01/23/2020 Document Revised: 05/14/2020 Document Reviewed: 01/23/2020  Elsevier Patient Education © 2020 [employer]

## 2020-11-13 NOTE — ED PROVIDER NOTES
Subjective   30-year-old previously healthy female recently diagnosed with COVID-19 presents the emergency department with shortness of breath and cough.  Reports that symptoms worsened today.  She does have albuterol inhaler at home and some nausea medication that was prescribed at the day she went in for testing.  Reports nonproductive cough.  Reports myalgias.    Family history, surgical history, social history, current medications and allergies are reviewed with the patient and triage documentation and vitals are reviewed.      History provided by:  Patient and medical records   used: No        Review of Systems   Constitutional: Positive for chills and fever.   HENT: Negative for congestion and sore throat.    Eyes: Negative for photophobia and visual disturbance.   Respiratory: Positive for cough and shortness of breath. Negative for wheezing.    Cardiovascular: Positive for chest pain. Negative for palpitations and leg swelling.   Gastrointestinal: Positive for nausea and vomiting. Negative for abdominal pain, constipation and diarrhea.   Endocrine: Negative for polydipsia, polyphagia and polyuria.   Genitourinary: Negative for dysuria, frequency and urgency.   Musculoskeletal: Positive for myalgias. Negative for arthralgias, back pain and neck pain.   Skin: Negative for color change, rash and wound.   Allergic/Immunologic: Negative.    Neurological: Negative for weakness, light-headedness, numbness and headaches.   Hematological: Negative.    Psychiatric/Behavioral: The patient is nervous/anxious.        Past Medical History:   Diagnosis Date   • Acid reflux    • Allergic    • Anxiety    • Depression    • Gallbladder abscess    • Migraines    • Sinusitis    • Stomach ulcer        Allergies   Allergen Reactions   • Amiodarone    • Latex    • Nabumetone Nausea Only   • Penicillins Hives   • Sulfa Antibiotics Hives       Past Surgical History:   Procedure Laterality Date   • CHOLECYSTECTOMY      • ENDOSCOPY N/A 5/23/2018    Procedure: ESOPHAGOGASTRODUODENOSCOPY possible dilation;  Surgeon: Rafael Garcia MD;  Location: [facility] ENDOSCOPY;  Service: Gastroenterology   • FINGER NAIL SURGERY     • GALLBLADDER SURGERY     • TONSILLECTOMY AND ADENOIDECTOMY     • WISDOM TOOTH EXTRACTION         Family History   Problem Relation Age of Onset   • Arthritis Mother    • Diabetes Mother    • Cancer Mother         breat cancer   • Hypertension Mother    • Hypertension Father    • Hypertension Maternal Grandmother    • Heart disease Maternal Grandfather    • Hypertension Maternal Grandfather        Social History     Socioeconomic History   • Marital status:      Spouse name: Not on file   • Number of children: Not on file   • Years of education: Not on file   • Highest education level: Not on file   Tobacco Use   • Smoking status: Never Smoker   • Smokeless tobacco: Never Used   Substance and Sexual Activity   • Alcohol use: No   • Drug use: No   • Sexual activity: Defer     Birth control/protection: Injection           Objective   Physical Exam  Vitals signs and nursing note reviewed.   Constitutional:       General: She is not in acute distress.     Appearance: She is well-developed. She is ill-appearing. She is not toxic-appearing or diaphoretic.   HENT:      Head: Normocephalic.   Eyes:      Pupils: Pupils are equal, round, and reactive to light.   Neck:      Musculoskeletal: Normal range of motion.   Cardiovascular:      Rate and Rhythm: Normal rate and regular rhythm.  No extrasystoles are present.     Pulses: Normal pulses.      Heart sounds: No murmur.   Pulmonary:      Effort: Pulmonary effort is normal. No tachypnea, accessory muscle usage or respiratory distress.      Breath sounds: Normal breath sounds. No decreased breath sounds, wheezing, rhonchi or rales.   Chest:      Chest wall: No tenderness.   Abdominal:      General: Bowel sounds are normal.      Palpations: Abdomen is soft.       Tenderness: There is no abdominal tenderness.   Musculoskeletal: Normal range of motion.      Right lower leg: She exhibits no tenderness. No edema.      Left lower leg: She exhibits no tenderness. No edema.   Skin:     General: Skin is warm and dry.      Capillary Refill: Capillary refill takes less than 2 seconds.      Coloration: Skin is not pale.      Findings: No erythema or rash.   Neurological:      General: No focal deficit present.      Mental Status: She is alert and oriented to person, place, and time.   Psychiatric:         Mood and Affect: Mood is anxious.         Procedures  none         ED Course      Labs Reviewed   COMPREHENSIVE METABOLIC PANEL - Abnormal; Notable for the following components:       Result Value    Glucose 110 (*)     Alkaline Phosphatase 121 (*)     All other components within normal limits    Narrative:     GFR Normal >60  Chronic Kidney Disease <60  Kidney Failure <15     CBC WITH AUTO DIFFERENTIAL - Abnormal; Notable for the following components:    WBC 13.09 (*)     RBC 5.79 (*)     MCV 76.3 (*)     MCH 24.2 (*)     Neutrophils, Absolute 7.77 (*)     Lymphocytes, Absolute 4.27 (*)     Immature Grans, Absolute 0.06 (*)     All other components within normal limits   HCG, SERUM, QUALITATIVE - Normal   D-DIMER, QUANTITATIVE - Normal    Narrative:     Dimer values <500 ng/ml FEU are FDA approved as aid in diagnosis of deep venous thrombosis and pulmonary embolism.  This test should not be used in an exclusion strategy with pretest probability alone.    A recent guideline regarding diagnosis for pulmonary thromboembolism recommends an adjusted exclusion criterion of age x 10 ng/ml FEU for patients >50 years of age (Sarah Intern Med 2015; 163: 701-711).     CBC AND DIFFERENTIAL    Narrative:     The following orders were created for panel order CBC & Differential.  Procedure                               Abnormality         Status                     ---------                                -----------         ------                     CBC Auto Differential[[id number]]        Abnormal            Final result                 Please view results for these tests on the individual orders.     Xr Chest 1 View    Result Date: 11/12/2020  Narrative: PORTABLE CHEST CLINICAL HISTORY:  short of breath, covid COMPARISON:  6/9/2017. FINDINGS:  Single portable view of the chest obtained.  The lungs are well expanded and clear.  Cardiac size is within normal limits.  Vascularity is normal considering technique.  No pleural fluid is demonstrated by portable imaging.     Impression: No active disease by portable imaging. Electronically signed by:  Tai Moya MD  11/12/2020 10:32 PM CST Workstation: [id number]    EKG November 12, 2020 at 2128 reveals sinus tachycardia rate of 117 bpm.  Some ST variation based on rate but no obvious acute ischemia.           MDM  Number of Diagnoses or Management Options  COVID-19:   Viral URI with cough:      Amount and/or Complexity of Data Reviewed  Clinical lab tests: reviewed  Tests in the radiology section of CPT®: reviewed    Patient Progress  Patient progress: stable    Patient already had known Covid diagnosis.  Symptomatic treatment improves her status in the emergency department.  Chest x-ray negative.  Laboratory studies unremarkable.  Advised on continued symptomatic treatment and agreeable to discharge.    Final diagnoses:   COVID-19   Viral URI with cough            Juventino Flores, DO  11/12/20 0462

## 2020-11-13 NOTE — ED TRIAGE NOTES
Pt states she was tested Monday for covid 19 got positive results today. Pt has tele med visit with pcp today sob and chest pressure worse was told to come into the ER.

## 2020-11-13 NOTE — PROGRESS NOTES
I have reviewed the e-Visit questionnaire and patient's answers, and my assessment and plan are as follows:    HPI  Adri Allison is a 30 y.o. female  presents with complaint of a 5 day history of cough, fever/chills shortness of breath, runny nose, congestion, sore throat, swollen glands, PND, earache, body aches, N/V, night sweats, fatigue and loss of taste or smell. Reports cough is dry and constant. Non-smoker. Tested positive for Covid on 11/9. Currently taking zofran which helps nausea and doxy which has not helped yet. She reports she has a nebulizer at home but has not used it. She is concerned about her shortness of breath and constant cough stating it gets to the point of taking her breath away. She reports when breathing she constantly feels like she is drowning and has a sharp pain in her nose. Today has been the worst day so far.     Review of Systems - Negative except those listed in the HPI.      Diagnoses and all orders for this visit:    1. COVID-19 (Primary)    Instructed patient to go to ER now for further evaluation.       FOLLOW-UP  ER now.      Time Documentation  Counseled patient  Counseling topics: diagnosis, follow-up  Total encounter time: counseling time more than 50% of visit: 15 minutes        Candie Puga, APRN  11/12/20  20:41 EST

## 2020-11-13 NOTE — DISCHARGE INSTRUCTIONS
Please return with new or worsening symptoms.  Quarantine at home, drink plenty of fluids and get plenty of rest.  Ensure that you are getting up and ambulating frequently and do not lie flat on your back.  Rest some on each side and on your stomach.  Use your albuterol, Zofran at home and also symptomatic over-the-counter treatment such as Mucinex Tylenol and Motrin.  May also take zinc and vitamin C to help with symptoms and treatment.

## 2020-11-17 ENCOUNTER — TELEMEDICINE (OUTPATIENT)
Dept: FAMILY MEDICINE CLINIC | Facility: TELEHEALTH | Age: 30
End: 2020-11-17

## 2020-11-17 VITALS — WEIGHT: 260 LBS | HEIGHT: 66 IN | BODY MASS INDEX: 41.78 KG/M2

## 2020-11-17 DIAGNOSIS — U07.1 COVID-19: Primary | ICD-10-CM

## 2020-11-17 PROCEDURE — 99213 OFFICE O/P EST LOW 20 MIN: CPT | Performed by: NURSE PRACTITIONER

## 2020-11-17 RX ORDER — DEXAMETHASONE 6 MG/1
6 TABLET ORAL
Qty: 10 TABLET | Refills: 0 | Status: SHIPPED | OUTPATIENT
Start: 2020-11-17 | End: 2020-11-27

## 2020-11-17 RX ORDER — AZITHROMYCIN 250 MG/1
TABLET, FILM COATED ORAL
Qty: 6 TABLET | Refills: 0 | Status: SHIPPED | OUTPATIENT
Start: 2020-11-17 | End: 2021-02-17

## 2020-11-17 NOTE — PATIENT INSTRUCTIONS
[facility]  Infection Prevention in the Home  If you have an infection, may have been exposed to an infection, or are taking care of someone who has an infection, it is important to know how to keep the infection from spreading. Follow your health care provider's instructions and use these guidelines to help stop the spread of infection.  How infections are spread  In order for an infection to spread, the following must be present:  · A germ. This may be a virus, bacteria, fungus, or parasite.  · A place for the germ to live. This may be:  ? On or in a person, animal, plant, or food.  ? In soil or water.  ? On surfaces, such as a door handle.  · A person or animal who can develop a disease if the germ enters the body (host). The host does not have resistance to the germ.  · A way for the germ to enter the host. This may occur by:  ? Direct contact with an infected person or animal. This can happen through shaking hands or hugging. Some germs can also travel through the air and spread to others. This can happen when an infected person coughs or sneezes on or near other people.  ? Indirect contact. This occurs when the germ enters the host through contact with an infected object. Examples include:  § Eating or drinking food or water that has the germ (is contaminated).  § Touching a contaminated surface with your hands, and then touching your face, eyes, nose, or mouth.  Supplies needed:  · Soap.  · Alcohol-based hand .  · Standard cleaning products.  · Disinfectants, such as bleach.  · Reusable cleaning cloths, sponges, or paper towels.  · Disposable or reusable utility gloves.  How to prevent infection from spreading  There are several things that you can do to help prevent infection from spreading.  Take these general actions  Everyone should take the following actions to prevent the spread of infection:  · Wash your hands often with soap and water for at least 20 seconds. If soap and water are not  available, use alcohol-based hand .  · Avoid touching your face, mouth, nose, or eyes.  · Cough or sneeze into a tissue, sleeve, or elbow instead of into your hand or into the air.  ? If you cough or sneeze into a tissue, throw it away immediately and wash your hands.    Keep your bathroom clean  · Provide soap.  · Change towels and washcloths frequently.  · Change toothbrushes often and store them separately in a clean, dry place.  · Clean and disinfect all surfaces, including the toilet, floor, tub, shower, and sink.  · Do not share personal items, such as razors, toothbrushes, deodorant, jaime, brushes, towels, and washcloths.  Maintain hygiene in the kitchen    · Wash your hands before and after preparing food and before you eat.  · Clean the inside of your refrigerator each week.  · Keep your refrigerator set at 40°F (4°C) or less, and set your freezer at 0°F (-18°C) or less.  · Keep work surfaces clean. Disinfect them regularly.  · Wash your dishes in hot, soapy water. Air-dry your dishes or use a .  · Do not share dishes or eating utensils.  Handle food safely  · Store food carefully.  · Refrigerate leftovers promptly in covered containers.  · Throw out stale or spoiled food.  · Thaw foods in the refrigerator or microwave, not at room temperature.  · Serve foods at the proper temperature. Do not eat raw meat. Make sure it is cooked to the appropriate temperature. Cook eggs until they are firm.  · Wash fruits and vegetables under running water.  · Use separate cutting boards, plates, and utensils for raw foods and cooked foods.  · Use a clean spoon each time you sample food while cooking.  Do laundry the right way  · Wear gloves if laundry is visibly soiled.  · Do not shake soiled laundry. Doing that may send germs into the air.  · Wash laundry in hot water.  · If you cannot wash the laundry right away, place it in a plastic bag and wash it as soon as possible.  Be careful around animals and  pets  · Wash your hands before and after touching animals.  · If you have a pet, ensure that your pet stays clean. Do not let people with weak immune systems touch bird droppings, fish tank water, or a litter box.  ? If you have a pet cage or litter box, be sure to clean it every day.  · If you are sick, stay away from animals and have someone else care for them if possible.  How to clean and disinfect objects and surfaces  Precautions  · Some disinfectants work for certain germs and not others. Read the 's instructions or read online resources to determine if the product you are using will work for the germ you are trying to remove.  · If you choose to use bleach, use it safely. Never mix it with other cleaning products, especially those that contain ammonia. This mixture can create a dangerous gas that may be deadly.  · Keep proper movement of fresh air in your home (ventilation).  · Pour used mop water down the utility sink or toilet. Do not pour this water down the kitchen sink.  Objects and surfaces    · If surfaces are visibly soiled, clean them first with soap and water before disinfecting.  · Disinfect surfaces that are frequently touched every day. This may include:  ? Counters.  ? Tables.  ? Doorknobs.  ? Sinks and faucets.  ? Electronics, such as:  § Phones.  § Remote controls.  § Keyboards.  § Computers and tablets.  Cleaning supplies  Some cleaning supplies can breed germs. Take good care of them to prevent germs from spreading. To do this:  · Soak toilet brushes, mops, and sponges in bleach and water for 5 minutes after each use, or according to 's instructions.  · Wash reusable cleaning cloths and sanitize sponges after each use.  · Throw away disposable gloves after one use.  · Replace reusable utility gloves if they are cracked or torn or if they start to peel.  Additional actions if you are sick  If you live with other people:    · Avoid close contact with those around you.  Stay at least 3 ft (1 m) away from others, if possible.  · Use a separate bathroom, if possible.  · If possible, sleep in a separate bedroom or in a separate bed to prevent infecting other household members.  ? Change bedroom linens each week or whenever they are soiled.  · Have everyone in the household wash hands often with soap and water. If soap and water are not available, use alcohol-based hand .  In general:  · Stay home except to get medical care. Call ahead before visiting your health care provider.  · Ask others to get groceries and household supplies and to refill prescriptions for you.  · Avoid public areas. Try not to take public transportation.  · If you can, wear a mask if you need to go out of the house, or if you are in close contact with someone who is not sick.  · Avoid visitors until you have completely recovered, or until you have no signs and symptoms of infection.  · Avoid preparing food or providing care for others. If you must prepare food or provide care for others, wear a mask and wash your hands before and after doing these things.  Where to find more information  · Centers for Disease Control and Prevention: www.cdc.gov/nonpharmaceutical-interventions/index.html  · World Health Organization (WHO): www.who.int/infection-prevention/about/en/  · Association for Professionals in Infection Control and Epidemiology: professionals.site.apic.org/settings-of-care/non-healthcare-setting/home/  Summary  · It is important to know how to keep the infection from spreading.  · Make sure everyone in your household washes their hands often with soap and water.  · Disinfect surfaces that are frequently touched every day.  · If you are sick, stay home except to get medical care.  This information is not intended to replace advice given to you by your health care provider. Make sure you discuss any questions you have with your health care provider.  Document Released: 09/26/2009 Document Revised:  04/14/2020 Document Reviewed: 03/13/2020  [employer]vier Patient Education © 2020 [employer]  COVID-19  COVID-19 is a respiratory infection that is caused by a virus called severe acute respiratory syndrome coronavirus 2 (SARS-CoV-2). The disease is also known as coronavirus disease or novel coronavirus. In some people, the virus may not cause any symptoms. In others, it may cause a serious infection. The infection can get worse quickly and can lead to complications, such as:  · Pneumonia, or infection of the lungs.  · Acute respiratory distress syndrome or ARDS. This is fluid build-up in the lungs.  · Acute respiratory failure. This is a condition in which there is not enough oxygen passing from the lungs to the body.  · Sepsis or septic shock. This is a serious bodily reaction to an infection.  · Blood clotting problems.  · Secondary infections due to bacteria or fungus.  The virus that causes COVID-19 is contagious. This means that it can spread from person to person through droplets from coughs and sneezes (respiratory secretions).  What are the causes?  This illness is caused by a virus. You may catch the virus by:  · Breathing in droplets from an infected person's cough or sneeze.  · Touching something, like a table or a doorknob, that was exposed to the virus (contaminated) and then touching your mouth, nose, or eyes.  What increases the risk?  Risk for infection  You are more likely to be infected with this virus if you:  · Live in or travel to an area with a COVID-19 outbreak.  · Come in contact with a sick person who recently traveled to an area with a COVID-19 outbreak.  · Provide care for or live with a person who is infected with COVID-19.  Risk for serious illness  You are more likely to become seriously ill from the virus if you:  · Are 65 years of age or older.  · Have a long-term disease that lowers your body's ability to fight infection (immunocompromised).  · Live in a nursing home or long-term care  facility.  · Have a long-term (chronic) disease such as:  ? Chronic lung disease, including chronic obstructive pulmonary disease or asthma  ? Heart disease.  ? Diabetes.  ? Chronic kidney disease.  ? Liver disease.  · Are obese.  What are the signs or symptoms?  Symptoms of this condition can range from mild to severe. Symptoms may appear any time from 2 to 14 days after being exposed to the virus. They include:  · A fever.  · A cough.  · Difficulty breathing.  · Chills.  · Muscle pains.  · A sore throat.  · Loss of taste or smell.  Some people may also have stomach problems, such as nausea, vomiting, or diarrhea.  Other people may not have any symptoms of COVID-19.  How is this diagnosed?  This condition may be diagnosed based on:  · Your signs and symptoms, especially if:  ? You live in an area with a COVID-19 outbreak.  ? You recently traveled to or from an area where the virus is common.  ? You provide care for or live with a person who was diagnosed with COVID-19.  · A physical exam.  · Lab tests, which may include:  ? A nasal swab to take a sample of fluid from your nose.  ? A throat swab to take a sample of fluid from your throat.  ? A sample of mucus from your lungs (sputum).  ? Blood tests.  · Imaging tests, which may include, X-rays, CT scan, or ultrasound.  How is this treated?  At present, there is no medicine to treat COVID-19. Medicines that treat other diseases are being used on a trial basis to see if they are effective against COVID-19.  Your health care provider will talk with you about ways to treat your symptoms. For most people, the infection is mild and can be managed at home with rest, fluids, and over-the-counter medicines.  Treatment for a serious infection usually takes places in a hospital intensive care unit (ICU). It may include one or more of the following treatments. These treatments are given until your symptoms improve.  · Receiving fluids and medicines through an  IV.  · Supplemental oxygen. Extra oxygen is given through a tube in the nose, a face mask, or a magallon.  · Positioning you to lie on your stomach (prone position). This makes it easier for oxygen to get into the lungs.  · Continuous positive airway pressure (CPAP) or bi-level positive airway pressure (BPAP) machine. This treatment uses mild air pressure to keep the airways open. A tube that is connected to a motor delivers oxygen to the body.  · Ventilator. This treatment moves air into and out of the lungs by using a tube that is placed in your windpipe.  · Tracheostomy. This is a procedure to create a hole in the neck so that a breathing tube can be inserted.  · Extracorporeal membrane oxygenation (ECMO). This procedure gives the lungs a chance to recover by taking over the functions of the heart and lungs. It supplies oxygen to the body and removes carbon dioxide.  Follow these instructions at home:  Lifestyle  · If you are sick, stay home except to get medical care. Your health care provider will tell you how long to stay home. Call your health care provider before you go for medical care.  · Rest at home as told by your health care provider.  · Do not use any products that contain nicotine or tobacco, such as cigarettes, e-cigarettes, and chewing tobacco. If you need help quitting, ask your health care provider.  · Return to your normal activities as told by your health care provider. Ask your health care provider what activities are safe for you.  General instructions  · Take over-the-counter and prescription medicines only as told by your health care provider.  · Drink enough fluid to keep your urine pale yellow.  · Keep all follow-up visits as told by your health care provider. This is important.  How is this prevented?    There is no vaccine to help prevent COVID-19 infection. However, there are steps you can take to protect yourself and others from this virus.  To protect yourself:   · Do not travel to areas  where COVID-19 is a risk. The areas where COVID-19 is reported change often. To identify high-risk areas and travel restrictions, check the CDC travel website: wwwnc.cdc.gov/travel/notices  · If you live in, or must travel to, an area where COVID-19 is a risk, take precautions to avoid infection.  ? Stay away from people who are sick.  ? Wash your hands often with soap and water for 20 seconds. If soap and water are not available, use an alcohol-based hand .  ? Avoid touching your mouth, face, eyes, or nose.  ? Avoid going out in public, follow guidance from your state and local health authorities.  ? If you must go out in public, wear a cloth face covering or face mask.  ? Disinfect objects and surfaces that are frequently touched every day. This may include:  § Counters and tables.  § Doorknobs and light switches.  § Sinks and faucets.  § Electronics, such as phones, remote controls, keyboards, computers, and tablets.  To protect others:  If you have symptoms of COVID-19, take steps to prevent the virus from spreading to others.  · If you think you have a COVID-19 infection, contact your health care provider right away. Tell your health care team that you think you may have a COVID-19 infection.  · Stay home. Leave your house only to seek medical care. Do not use public transport.  · Do not travel while you are sick.  · Wash your hands often with soap and water for 20 seconds. If soap and water are not available, use alcohol-based hand .  · Stay away from other members of your household. Let healthy household members care for children and pets, if possible. If you have to care for children or pets, wash your hands often and wear a mask. If possible, stay in your own room, separate from others. Use a different bathroom.  · Make sure that all people in your household wash their hands well and often.  · Cough or sneeze into a tissue or your sleeve or elbow. Do not cough or sneeze into your hand or  into the air.  · Wear a cloth face covering or face mask.  Where to find more information  · Centers for Disease Control and Prevention: www.cdc.gov/coronavirus/2019-ncov/index.html  · World Health Organization: www.who.int/health-topics/coronavirus  Contact a health care provider if:  · You live in or have traveled to an area where COVID-19 is a risk and you have symptoms of the infection.  · You have had contact with someone who has COVID-19 and you have symptoms of the infection.  Get help right away if:  · You have trouble breathing.  · You have pain or pressure in your chest.  · You have confusion.  · You have bluish lips and fingernails.  · You have difficulty waking from sleep.  · You have symptoms that get worse.  These symptoms may represent a serious problem that is an emergency. Do not wait to see if the symptoms will go away. Get medical help right away. Call your local emergency services (911 in the [place] Do not drive yourself to the hospital. Let the emergency medical personnel know if you think you have COVID-19.  Summary  · COVID-19 is a respiratory infection that is caused by a virus. It is also known as coronavirus disease or novel coronavirus. It can cause serious infections, such as pneumonia, acute respiratory distress syndrome, acute respiratory failure, or sepsis.  · The virus that causes COVID-19 is contagious. This means that it can spread from person to person through droplets from coughs and sneezes.  · You are more likely to develop a serious illness if you are 65 years of age or older, have a weak immunity, live in a nursing home, or have chronic disease.  · There is no medicine to treat COVID-19. Your health care provider will talk with you about ways to treat your symptoms.  · Take steps to protect yourself and others from infection. Wash your hands often and disinfect objects and surfaces that are frequently touched every day. Stay away from people who are sick and wear a mask if you  are sick.  This information is not intended to replace advice given to you by your health care provider. Make sure you discuss any questions you have with your health care provider.  Document Released: 01/23/2020 Document Revised: 05/14/2020 Document Reviewed: 01/23/2020  [employer]vier Patient Education © 2020 [employer]

## 2020-11-17 NOTE — PROGRESS NOTES
CHIEF COMPLAINT  Cc: coronavirus        HPI  Sanjuana Allison is a 30 y.o. female  presents with complaint of coronavirus. She was seen in the Urgent Care on 11/09/2020. St that time she was diagnosed with sinusitis and treated with doxycyline. She was also prescribed zofran. During that visit she was COVID-19 tested also. Her test came back positive for COVID-19 on 11/12. She was seen in the ER on that date also and diagnosed  COVID-19 and shortness of breath. She was given dexamethasone and mucinex.A chest xray was done that showed no active diseae.. The patient is still coughing  And feels mildly short of breath. She reports that the health department advised her to do a virtuall visit today.     Review of Systems   Constitutional: Negative for appetite change, fatigue and fever.   HENT: Positive for congestion (yellow), ear pain, sinus pressure and sinus pain. Negative for sore throat.         Loss of taste and smell is resolving   Respiratory: Positive for cough, chest tightness and shortness of breath (mild). Negative for wheezing.    Cardiovascular: Negative for chest pain.   Gastrointestinal: Positive for nausea (improving). Negative for diarrhea and vomiting (resolved).   Musculoskeletal: Negative for myalgias.   Neurological: Positive for headaches.       Past Medical History:   Diagnosis Date   • Acid reflux    • Allergic    • Anxiety    • Depression    • Gallbladder abscess    • Migraines    • Sinusitis    • Stomach ulcer        Family History   Problem Relation Age of Onset   • Arthritis Mother    • Diabetes Mother    • Cancer Mother         breat cancer   • Hypertension Mother    • Hypertension Father    • Hypertension Maternal Grandmother    • Heart disease Maternal Grandfather    • Hypertension Maternal Grandfather        Social History     Socioeconomic History   • Marital status:      Spouse name: Not on file   • Number of children: Not on file   • Years of education: Not on file   •  "Highest education level: Not on file   Tobacco Use   • Smoking status: Never Smoker   • Smokeless tobacco: Never Used   Substance and Sexual Activity   • Alcohol use: No   • Drug use: No   • Sexual activity: Defer     Birth control/protection: Injection         Ht 166.4 cm (65.5\")   Wt 118 kg (260 lb)   Breastfeeding No   BMI 42.61 kg/m²     PHYSICAL EXAM  Physical Exam   Constitutional: She appears well-developed and well-nourished.   HENT:   Head: Normocephalic and atraumatic.   Right Ear: Hearing and external ear normal. There is tenderness (mild).   Left Ear: Hearing and external ear normal. There is tenderness (mild).   Nose: Congestion present.   Eyes: Lids are normal. Right eye exhibits no discharge and no exudate. Left eye exhibits no discharge and no exudate. Right conjunctiva is not injected. Left conjunctiva is not injected.   Pulmonary/Chest: No accessory muscle usage. No tachypnea and no bradypnea.  No respiratory distress (dry frequent cough at visit).No use of oxygen by nasal cannulaNo use of oxygen by mask noted.  Abdominal: Abdomen appears normal.   Neurological: She is alert. No cranial nerve deficit.   Skin: Her skin appears normal.  Psychiatric: She has a normal mood and affect. Her speech is normal and behavior is normal. Judgment and thought content normal.       Results for orders placed or performed during the hospital encounter of 11/12/20   Comprehensive Metabolic Panel    Specimen: Blood   Result Value Ref Range    Glucose 110 (H) 65 - 99 mg/dL    BUN 6 6 - 20 mg/dL    Creatinine 0.70 0.57 - 1.00 mg/dL    Sodium 141 136 - 145 mmol/L    Potassium 3.5 3.5 - 5.2 mmol/L    Chloride 102 98 - 107 mmol/L    CO2 27.0 22.0 - 29.0 mmol/L    Calcium 9.6 8.6 - 10.5 mg/dL    Total Protein 7.7 6.0 - 8.5 g/dL    Albumin 4.30 3.50 - 5.20 g/dL    ALT (SGPT) 32 1 - 33 U/L    AST (SGOT) 19 1 - 32 U/L    Alkaline Phosphatase 121 (H) 39 - 117 U/L    Total Bilirubin 0.2 0.0 - 1.2 mg/dL    eGFR Non African " Amer 98 >60 mL/min/1.73    Globulin 3.4 gm/dL    A/G Ratio 1.3 g/dL    BUN/Creatinine Ratio 8.6 7.0 - 25.0    Anion Gap 12.0 5.0 - 15.0 mmol/L   CBC Auto Differential    Specimen: Blood   Result Value Ref Range    WBC 13.09 (H) 3.40 - 10.80 10*3/mm3    RBC 5.79 (H) 3.77 - 5.28 10*6/mm3    Hemoglobin 14.0 12.0 - 15.9 g/dL    Hematocrit 44.2 34.0 - 46.6 %    MCV 76.3 (L) 79.0 - 97.0 fL    MCH 24.2 (L) 26.6 - 33.0 pg    MCHC 31.7 31.5 - 35.7 g/dL    RDW 14.7 12.3 - 15.4 %    RDW-SD 40.0 37.0 - 54.0 fl    MPV 8.7 6.0 - 12.0 fL    Platelets 388 140 - 450 10*3/mm3    Neutrophil % 59.3 42.7 - 76.0 %    Lymphocyte % 32.6 19.6 - 45.3 %    Monocyte % 5.3 5.0 - 12.0 %    Eosinophil % 2.1 0.3 - 6.2 %    Basophil % 0.2 0.0 - 1.5 %    Immature Grans % 0.5 0.0 - 0.5 %    Neutrophils, Absolute 7.77 (H) 1.70 - 7.00 10*3/mm3    Lymphocytes, Absolute 4.27 (H) 0.70 - 3.10 10*3/mm3    Monocytes, Absolute 0.70 0.10 - 0.90 10*3/mm3    Eosinophils, Absolute 0.27 0.00 - 0.40 10*3/mm3    Basophils, Absolute 0.02 0.00 - 0.20 10*3/mm3    Immature Grans, Absolute 0.06 (H) 0.00 - 0.05 10*3/mm3    nRBC 0.0 0.0 - 0.2 /100 WBC   hCG, Serum, Qualitative    Specimen: Blood   Result Value Ref Range    HCG Qualitative Negative Negative   D-dimer, Quantitative    Specimen: Blood   Result Value Ref Range    D-Dimer, Quantitative 313 0 - 470 ng/mL (FEU)       Diagnoses and all orders for this visit:    1. COVID-19 (Primary)    Other orders  -     azithromycin (Zithromax) 250 MG tablet; Take 2 tablets the first day, then 1 tablet daily for 4 days.  Dispense: 6 tablet; Refill: 0  -     dexamethasone (DECADRON) 6 MG tablet; Take 1 tablet by mouth Daily With Breakfast for 10 days.  Dispense: 10 tablet; Refill: 0    Take dexamethasone with food    Probiotics for two weeks related to taking antibiotics. The pharmacist can help you with this if needed. Do not take within two hours of antibiotic.    Take medicine as prescribed.    Alternate tylenol and motrin for  pain and/or fever.    Stay hydrated altenate rest and ambualtion.     Quarantine for 10 days from onset of symptoms and 72 hours fever/symptoms free without fever reducers    COVID-19 test results were positive 11/12       You will have a daily questionnaire monitoring system for COVID-19  in your chart. Taking the time to fill this out would be greatly appreciated. We want to know how you are doing.    FOLLOW-UP  Proceed to ER if symptoms worsen or if no relief of symtpoms in 48-72 hours    Patient verbalizes understanding of medication dosage, comfort measures, instructions for treatment and follow-up.    Bere Mcgarry, APRN  11/17/2020  16:03 EST    This visit was performed via Telehealth.  This patient has been instructed to follow-up with their primary care provider if their symptoms worsen or the treatment provided does not resolve their illness.

## 2020-11-19 LAB
QT INTERVAL: 318 MS
QTC INTERVAL: 443 MS

## 2021-02-10 ENCOUNTER — APPOINTMENT (OUTPATIENT)
Dept: CT IMAGING | Facility: HOSPITAL | Age: 31
End: 2021-02-10

## 2021-02-10 ENCOUNTER — HOSPITAL ENCOUNTER (EMERGENCY)
Facility: HOSPITAL | Age: 31
Discharge: HOME | End: 2021-02-10
Attending: EMERGENCY MEDICINE | Admitting: EMERGENCY MEDICINE

## 2021-02-10 VITALS
HEIGHT: 66 IN | RESPIRATION RATE: 20 BRPM | DIASTOLIC BLOOD PRESSURE: 109 MMHG | OXYGEN SATURATION: 99 % | TEMPERATURE: 98.5 F | BODY MASS INDEX: 40.18 KG/M2 | SYSTOLIC BLOOD PRESSURE: 151 MMHG | HEART RATE: 99 BPM | WEIGHT: 250 LBS

## 2021-02-10 DIAGNOSIS — R03.0 ELEVATED BLOOD PRESSURE READING: ICD-10-CM

## 2021-02-10 DIAGNOSIS — S01.01XA LACERATION OF SCALP, INITIAL ENCOUNTER: Primary | ICD-10-CM

## 2021-02-10 DIAGNOSIS — S06.0X0A CONCUSSION WITHOUT LOSS OF CONSCIOUSNESS, INITIAL ENCOUNTER: ICD-10-CM

## 2021-02-10 PROCEDURE — 99283 EMERGENCY DEPT VISIT LOW MDM: CPT

## 2021-02-10 PROCEDURE — 90471 IMMUNIZATION ADMIN: CPT | Performed by: EMERGENCY MEDICINE

## 2021-02-10 PROCEDURE — 90471 IMMUNIZATION ADMIN: CPT

## 2021-02-10 PROCEDURE — 70450 CT HEAD/BRAIN W/O DYE: CPT

## 2021-02-10 PROCEDURE — 72125 CT NECK SPINE W/O DYE: CPT

## 2021-02-10 PROCEDURE — 25010000002 TDAP 5-2.5-18.5 LF-MCG/0.5 SUSPENSION: Performed by: EMERGENCY MEDICINE

## 2021-02-10 PROCEDURE — 90715 TDAP VACCINE 7 YRS/> IM: CPT | Performed by: EMERGENCY MEDICINE

## 2021-02-10 RX ORDER — LIDOCAINE HYDROCHLORIDE AND EPINEPHRINE 10; 10 MG/ML; UG/ML
10 INJECTION, SOLUTION INFILTRATION; PERINEURAL ONCE
Status: COMPLETED | OUTPATIENT
Start: 2021-02-10 | End: 2021-02-10

## 2021-02-10 RX ORDER — DIAPER,BRIEF,INFANT-TODD,DISP
EACH MISCELLANEOUS ONCE
Status: COMPLETED | OUTPATIENT
Start: 2021-02-10 | End: 2021-02-10

## 2021-02-10 RX ORDER — GINSENG 100 MG
CAPSULE ORAL 2 TIMES DAILY
Qty: 14 G | Refills: 0 | Status: SHIPPED | OUTPATIENT
Start: 2021-02-10 | End: 2021-05-05

## 2021-02-10 RX ORDER — IBUPROFEN 800 MG/1
800 TABLET ORAL ONCE
Status: COMPLETED | OUTPATIENT
Start: 2021-02-10 | End: 2021-02-10

## 2021-02-10 RX ADMIN — IBUPROFEN 800 MG: 800 TABLET ORAL at 09:13

## 2021-02-10 RX ADMIN — BACITRACIN 1 APPLICATION: 500 OINTMENT TOPICAL at 10:20

## 2021-02-10 RX ADMIN — LIDOCAINE HYDROCHLORIDE AND EPINEPHRINE 10 ML: 10; 10 INJECTION, SOLUTION INFILTRATION; PERINEURAL at 10:35

## 2021-02-10 RX ADMIN — TETANUS TOXOID, REDUCED DIPHTHERIA TOXOID AND ACELLULAR PERTUSSIS VACCINE, ADSORBED 0.5 ML: 5; 2.5; 8; 8; 2.5 SUSPENSION INTRAMUSCULAR at 09:04

## 2021-02-10 NOTE — ED NOTES
Patient here for fall at home.  Patient slipped on ice and hit back of head.  Patient denies loss of consciousness, only dizzy after getting back up.   is with patient.      Lori Martin, RN  02/10/21 0977

## 2021-02-10 NOTE — DISCHARGE INSTRUCTIONS
Staple removal 7 days  Return ED fever, bleeding, infection, vomiting, headache, worse condition, any other concerns

## 2021-02-10 NOTE — ED PROVIDER NOTES
Subjective   31yo female presents ED s/p slip et fall landing on vertex scalp with c/o nausea/headache/scalp wound/neck pain.  Denies chest pain/soa/abd pain/back pain.        History provided by:  Patient  Head Injury  Location:  Occipital  Associated symptoms: headache, nausea and neck pain        Review of Systems   Constitutional: Negative.    Cardiovascular: Negative.    Gastrointestinal: Positive for abdominal pain and nausea.   Musculoskeletal: Positive for neck pain.   Skin: Positive for wound.   Allergic/Immunologic: Negative for immunocompromised state.   Neurological: Positive for headaches.   All other systems reviewed and are negative.      Past Medical History:   Diagnosis Date   • Acid reflux    • Allergic    • Anxiety    • Depression    • Gallbladder abscess    • Migraines    • Sinusitis    • Stomach ulcer        Allergies   Allergen Reactions   • Amiodarone    • Latex    • Nabumetone Nausea Only   • Penicillins Hives   • Sulfa Antibiotics Hives       Past Surgical History:   Procedure Laterality Date   • CHOLECYSTECTOMY     • ENDOSCOPY N/A 5/23/2018    Procedure: ESOPHAGOGASTRODUODENOSCOPY possible dilation;  Surgeon: Rafael Garcia MD;  Location: [facility] ENDOSCOPY;  Service: Gastroenterology   • FINGER NAIL SURGERY     • GALLBLADDER SURGERY     • TONSILLECTOMY AND ADENOIDECTOMY     • WISDOM TOOTH EXTRACTION         Family History   Problem Relation Age of Onset   • Arthritis Mother    • Diabetes Mother    • Cancer Mother         breat cancer   • Hypertension Mother    • Hypertension Father    • Hypertension Maternal Grandmother    • Heart disease Maternal Grandfather    • Hypertension Maternal Grandfather        Social History     Socioeconomic History   • Marital status:      Spouse name: Not on file   • Number of children: Not on file   • Years of education: Not on file   • Highest education level: Not on file   Tobacco Use   • Smoking status: Never Smoker   • Smokeless tobacco: Never Used    Substance and Sexual Activity   • Alcohol use: No   • Drug use: No   • Sexual activity: Defer     Birth control/protection: Injection           Objective   Physical Exam  Vitals signs and nursing note reviewed.   Constitutional:       General: She is in acute distress.      Appearance: Normal appearance.   HENT:      Head: Normocephalic. No raccoon eyes or Pacheco's sign.        Right Ear: Tympanic membrane, ear canal and external ear normal.      Left Ear: Tympanic membrane, ear canal and external ear normal.      Nose: Nose normal.      Mouth/Throat:      Mouth: Mucous membranes are moist.   Eyes:      Pupils: Pupils are equal, round, and reactive to light.   Neck:      Trachea: Trachea and phonation normal.        Comments: Neg stepoff/deformity c spine  c collar placed  Cardiovascular:      Rate and Rhythm: Regular rhythm. Tachycardia present.      Pulses: Normal pulses.      Heart sounds: Normal heart sounds. No murmur. No friction rub. No gallop.    Pulmonary:      Effort: Pulmonary effort is normal. No respiratory distress.      Breath sounds: Normal breath sounds. No wheezing, rhonchi or rales.   Abdominal:      General: Bowel sounds are normal. There is no distension.      Palpations: Abdomen is soft.      Tenderness: There is no abdominal tenderness.   Musculoskeletal: Normal range of motion.      Thoracic back: She exhibits no tenderness and no bony tenderness.      Lumbar back: She exhibits no tenderness and no bony tenderness.   Skin:     General: Skin is warm and dry.   Neurological:      Mental Status: She is alert.      GCS: GCS eye subscore is 4. GCS verbal subscore is 5. GCS motor subscore is 6.      Sensory: Sensation is intact.      Motor: Motor function is intact.   Psychiatric:         Mood and Affect: Mood is anxious.         Laceration Repair    Date/Time: 2/10/2021 10:13 AM  Performed by: Harinder Rodriguez MD  Authorized by: Harinder Rodriguez MD     Consent:     Consent obtained:   Verbal  Anesthesia (see MAR for exact dosages):     Anesthesia method:  Local infiltration    Local anesthetic:  Lidocaine 1% WITH epi  Laceration details:     Location:  Scalp    Scalp location:  Occipital    Length (cm):  2  Repair type:     Repair type:  Simple  Treatment:     Area cleansed with:  Hibiclens and saline    Amount of cleaning:  Standard  Skin repair:     Repair method:  Staples    Number of staples:  5  Post-procedure details:     Dressing:  Antibiotic ointment    Patient tolerance of procedure:  Tolerated well, no immediate complications               ED Course      Ct Head Without Contrast    Result Date: 2/10/2021  Narrative: EXAM: CT HEAD WITHOUT IV CONTRAST, CT CERVICAL SPINE WITHOUT IV CONTRAST INDICATION: Head trauma COMPARISONS: None TECHNIQUE: Noncontrast CT images were obtained through the brain and cervical spine. Reformats were provided. All CT scans at this facility uses dose modulation, iterative reconstruction, and/or weight-based dosing when appropriate to achieve a radiation dose as low as reasonably achievable. FINDINGS: Head: No intracranial hemorrhage, appreciable mass, mass effect or midline shift. There is preservation of the gray-white matter differentiation. No dense MCA or insular ribbon sign. The ventricles and other CSF containing spaces are within normal limits. Calvarium is intact. Paranasal sinuses are unremarkable. Mastoid air cells are clear. Orbits are unremarkable. Small amount of soft tissue fat stranding and small scalp hematoma the posterior occipital region, just left of midline. Cervical Spine: No acute fracture or subluxation.  There is minimal reversal of the normal cervical spine lordosis at C5-6, likely patient Baseline anatomy. Dens is intact. Lateral masses are symmetric. Vertebral body heights are grossly preserved. Disc spaces are grossly preserved. No significant spinal canal or neuroforaminal stenosis. No suspicious osseous lesion. The pre-vertebral  soft tissues are within normal limits. Paraspinal soft tissues are unremarkable. Lung apices are unremarkable.        Impression: No acute intracranial abnormality. Small posterior occipital scalp hematoma. No acute cervical spine abnormality. Electronically signed by:  Catina Hurtado MD  2/10/2021 9:38 AM CST Workstation: [id number]    Ct Cervical Spine Without Contrast    Result Date: 2/10/2021  Narrative: EXAM: CT HEAD WITHOUT IV CONTRAST, CT CERVICAL SPINE WITHOUT IV CONTRAST INDICATION: Head trauma COMPARISONS: None TECHNIQUE: Noncontrast CT images were obtained through the brain and cervical spine. Reformats were provided. All CT scans at this facility uses dose modulation, iterative reconstruction, and/or weight-based dosing when appropriate to achieve a radiation dose as low as reasonably achievable. FINDINGS: Head: No intracranial hemorrhage, appreciable mass, mass effect or midline shift. There is preservation of the gray-white matter differentiation. No dense MCA or insular ribbon sign. The ventricles and other CSF containing spaces are within normal limits. Calvarium is intact. Paranasal sinuses are unremarkable. Mastoid air cells are clear. Orbits are unremarkable. Small amount of soft tissue fat stranding and small scalp hematoma the posterior occipital region, just left of midline. Cervical Spine: No acute fracture or subluxation.  There is minimal reversal of the normal cervical spine lordosis at C5-6, likely patient Baseline anatomy. Dens is intact. Lateral masses are symmetric. Vertebral body heights are grossly preserved. Disc spaces are grossly preserved. No significant spinal canal or neuroforaminal stenosis. No suspicious osseous lesion. The pre-vertebral soft tissues are within normal limits. Paraspinal soft tissues are unremarkable. Lung apices are unremarkable.        Impression: No acute intracranial abnormality. Small posterior occipital scalp hematoma. No acute cervical spine  abnormality. Electronically signed by:  Catina Hurtado MD  2/10/2021 9:38 AM CST Workstation: [id number]    Labs Reviewed - No data to display                                       MDM  Number of Diagnoses or Management Options  Concussion without loss of consciousness, initial encounter:   Elevated blood pressure reading:   Laceration of scalp, initial encounter:   Diagnosis management comments: gcs 15. arom c spine. c collar discontinued. CT head/CT c spine negative. Wound repaired. Stable discharge.      Final diagnoses:   Laceration of scalp, initial encounter   Concussion without loss of consciousness, initial encounter   Elevated blood pressure reading            Harinder Rodriguez MD  02/10/21 1013       Harinder Rodriguez MD  02/10/21 1014

## 2021-05-17 ENCOUNTER — TELEMEDICINE (OUTPATIENT)
Dept: FAMILY MEDICINE CLINIC | Facility: TELEHEALTH | Age: 31
End: 2021-05-17

## 2021-05-17 DIAGNOSIS — J01.00 ACUTE MAXILLARY SINUSITIS, RECURRENCE NOT SPECIFIED: Primary | ICD-10-CM

## 2021-05-17 DIAGNOSIS — R05.9 COUGH: ICD-10-CM

## 2021-05-17 PROCEDURE — 99213 OFFICE O/P EST LOW 20 MIN: CPT | Performed by: NURSE PRACTITIONER

## 2021-05-17 RX ORDER — METHYLPREDNISOLONE 4 MG/1
TABLET ORAL
Qty: 21 TABLET | Refills: 0 | Status: SHIPPED | OUTPATIENT
Start: 2021-05-17 | End: 2021-09-13

## 2021-05-17 RX ORDER — HYDROXYZINE PAMOATE 25 MG/1
25 CAPSULE ORAL 3 TIMES DAILY PRN
COMMUNITY
Start: 2021-03-13 | End: 2022-04-16

## 2021-05-17 RX ORDER — DEXTROMETHORPHAN HYDROBROMIDE AND PROMETHAZINE HYDROCHLORIDE 15; 6.25 MG/5ML; MG/5ML
5 SYRUP ORAL NIGHTLY PRN
Qty: 100 ML | Refills: 0 | Status: SHIPPED | OUTPATIENT
Start: 2021-05-17 | End: 2021-09-13

## 2021-05-17 RX ORDER — DOXYCYCLINE 100 MG/1
100 CAPSULE ORAL 2 TIMES DAILY
Qty: 20 CAPSULE | Refills: 0 | Status: SHIPPED | OUTPATIENT
Start: 2021-05-17 | End: 2021-05-27

## 2021-05-17 NOTE — PROGRESS NOTES
CHIEF COMPLAINT  Chief Complaint   Patient presents with   • Earache         HPI  Sanjuana Allison is a 31 y.o. female  presents with complaint of allergy symptoms. Was seen at  on 5/5/21 for allergic rhinitis, was treated and felt better for about a week, now having worsening symptoms.  Facial pain and pressure, nasal congestion with scant discharge, sore throat, ear pain, PND, cough.  Denies fever, shortness of breath, loss of taste or smell    Review of Systems   Constitutional: Positive for fatigue. Negative for activity change and fever.   HENT: Positive for congestion, ear pain, postnasal drip, sinus pressure, sinus pain and sore throat.    Respiratory: Positive for cough. Negative for chest tightness, shortness of breath and wheezing.    Neurological: Negative for dizziness and headaches.   All other systems reviewed and are negative.      Past Medical History:   Diagnosis Date   • Acid reflux    • Allergic    • Anxiety    • Depression    • Gallbladder abscess    • Migraines    • Sinusitis    • Stomach ulcer        Family History   Problem Relation Age of Onset   • Arthritis Mother    • Diabetes Mother    • Cancer Mother         breat cancer   • Hypertension Mother    • Hypertension Father    • Hypertension Maternal Grandmother    • Heart disease Maternal Grandfather    • Hypertension Maternal Grandfather        Social History     Socioeconomic History   • Marital status:      Spouse name: Not on file   • Number of children: Not on file   • Years of education: Not on file   • Highest education level: Not on file   Tobacco Use   • Smoking status: Never Smoker   • Smokeless tobacco: Never Used   Substance and Sexual Activity   • Alcohol use: No   • Drug use: No   • Sexual activity: Defer     Birth control/protection: Injection         LMP 05/04/2021     PHYSICAL EXAM  Physical Exam   Constitutional: She is oriented to person, place, and time. She appears well-developed and well-nourished. She  does not have a sickly appearance. She appears ill.   HENT:   Head: Normocephalic and atraumatic.   Bilateral maxillary sinus tenderness   Pulmonary/Chest: Effort normal.  No respiratory distress (persistent cough during visit).  Lymphadenopathy:   Bilateral anterior cervical node tenderness   Neurological: She is alert and oriented to person, place, and time.         Diagnoses and all orders for this visit:    1. Acute maxillary sinusitis, recurrence not specified (Primary)  -     doxycycline (MONODOX) 100 MG capsule; Take 1 capsule by mouth 2 (Two) Times a Day for 10 days.  Dispense: 20 capsule; Refill: 0  -     methylPREDNISolone (MEDROL) 4 MG dose pack; Take as directed on package instructions.  Dispense: 21 tablet; Refill: 0    2. Cough  -     promethazine-dextromethorphan (PROMETHAZINE-DM) 6.25-15 MG/5ML syrup; Take 5 mL by mouth At Night As Needed for Cough.  Dispense: 100 mL; Refill: 0    --discussed importance of completing Doxycycline as prescribed; Medrol will decrease inflammation in sinuses, nasal passages, and eustachian tubes; prom-DM for night time cough  --advised warm salt water gargles for throat discomfort; ibuprofen for pain/fever; increased intake of fluids  --if no improvement in 5-7 days will need follow-up for further evaluation      FOLLOW-UP  As discussed during visit with PCP/[facility] if no improvement or Urgent Care/Emergency Department if worsening of symptoms    Patient verbalizes understanding of medication dosage, comfort measures, instructions for treatment and follow-up.    MARIA GUADALUPE Sánchez  05/17/2021  11:36 EDT    This visit was performed via Telehealth.  This patient has been instructed to follow-up with their primary care provider if their symptoms worsen or the treatment provided does not resolve their illness.

## 2021-05-17 NOTE — PATIENT INSTRUCTIONS
Sinusitis, Adult  Sinusitis is inflammation of your sinuses. Sinuses are hollow spaces in the bones around your face. Your sinuses are located:  · Around your eyes.  · In the middle of your forehead.  · Behind your nose.  · In your cheekbones.  Mucus normally drains out of your sinuses. When your nasal tissues become inflamed or swollen, mucus can become trapped or blocked. This allows bacteria, viruses, and fungi to grow, which leads to infection. Most infections of the sinuses are caused by a virus.  Sinusitis can develop quickly. It can last for up to 4 weeks (acute) or for more than 12 weeks (chronic). Sinusitis often develops after a cold.  What are the causes?  This condition is caused by anything that creates swelling in the sinuses or stops mucus from draining. This includes:  · Allergies.  · Asthma.  · Infection from bacteria or viruses.  · Deformities or blockages in your nose or sinuses.  · Abnormal growths in the nose (nasal polyps).  · Pollutants, such as chemicals or irritants in the air.  · Infection from fungi (rare).  What increases the risk?  You are more likely to develop this condition if you:  · Have a weak body defense system (immune system).  · Do a lot of swimming or diving.  · Overuse nasal sprays.  · Smoke.  What are the signs or symptoms?  The main symptoms of this condition are pain and a feeling of pressure around the affected sinuses. Other symptoms include:  · Stuffy nose or congestion.  · Thick drainage from your nose.  · Swelling and warmth over the affected sinuses.  · Headache.  · Upper toothache.  · A cough that may get worse at night.  · Extra mucus that collects in the throat or the back of the nose (postnasal drip).  · Decreased sense of smell and taste.  · Fatigue.  · A fever.  · Sore throat.  · Bad breath.  How is this diagnosed?  This condition is diagnosed based on:  · Your symptoms.  · Your medical history.  · A physical exam.  · Tests to find out if your condition is  acute or chronic. This may include:  ? Checking your nose for nasal polyps.  ? Viewing your sinuses using a device that has a light (endoscope).  ? Testing for allergies or bacteria.  ? Imaging tests, such as an MRI or CT scan.  In rare cases, a bone biopsy may be done to rule out more serious types of fungal sinus disease.  How is this treated?  Treatment for sinusitis depends on the cause and whether your condition is chronic or acute.  · If caused by a virus, your symptoms should go away on their own within 10 days. You may be given medicines to relieve symptoms. They include:  ? Medicines that shrink swollen nasal passages (topical intranasal decongestants).  ? Medicines that treat allergies (antihistamines).  ? A spray that eases inflammation of the nostrils (topical intranasal corticosteroids).  ? Rinses that help get rid of thick mucus in your nose (nasal saline washes).  · If caused by bacteria, your health care provider may recommend waiting to see if your symptoms improve. Most bacterial infections will get better without antibiotic medicine. You may be given antibiotics if you have:  ? A severe infection.  ? A weak immune system.  · If caused by narrow nasal passages or nasal polyps, you may need to have surgery.  Follow these instructions at home:  Medicines  · Take, use, or apply over-the-counter and prescription medicines only as told by your health care provider. These may include nasal sprays.  · If you were prescribed an antibiotic medicine, take it as told by your health care provider. Do not stop taking the antibiotic even if you start to feel better.  Hydrate and humidify    · Drink enough fluid to keep your urine pale yellow. Staying hydrated will help to thin your mucus.  · Use a cool mist humidifier to keep the humidity level in your home above 50%.  · Inhale steam for 10-15 minutes, 3-4 times a day, or as told by your health care provider. You can do this in the bathroom while a hot shower is  running.  · Limit your exposure to cool or dry air.  Rest  · Rest as much as possible.  · Sleep with your head raised (elevated).  · Make sure you get enough sleep each night.  General instructions    · Apply a warm, moist washcloth to your face 3-4 times a day or as told by your health care provider. This will help with discomfort.  · Wash your hands often with soap and water to reduce your exposure to germs. If soap and water are not available, use hand .  · Do not smoke. Avoid being around people who are smoking (secondhand smoke).  · Keep all follow-up visits as told by your health care provider. This is important.  Contact a health care provider if:  · You have a fever.  · Your symptoms get worse.  · Your symptoms do not improve within 10 days.  Get help right away if:  · You have a severe headache.  · You have persistent vomiting.  · You have severe pain or swelling around your face or eyes.  · You have vision problems.  · You develop confusion.  · Your neck is stiff.  · You have trouble breathing.  Summary  · Sinusitis is soreness and inflammation of your sinuses. Sinuses are hollow spaces in the bones around your face.  · This condition is caused by nasal tissues that become inflamed or swollen. The swelling traps or blocks the flow of mucus. This allows bacteria, viruses, and fungi to grow, which leads to infection.  · If you were prescribed an antibiotic medicine, take it as told by your health care provider. Do not stop taking the antibiotic even if you start to feel better.  · Keep all follow-up visits as told by your health care provider. This is important.  This information is not intended to replace advice given to you by your health care provider. Make sure you discuss any questions you have with your health care provider.  Document Revised: 05/20/2019 Document Reviewed: 05/20/2019  [employer] Patient Education © 2021 [employer]

## 2021-09-12 PROCEDURE — U0004 COV-19 TEST NON-CDC HGH THRU: HCPCS | Performed by: NURSE PRACTITIONER

## 2021-09-13 ENCOUNTER — TELEMEDICINE (OUTPATIENT)
Dept: FAMILY MEDICINE CLINIC | Facility: TELEHEALTH | Age: 31
End: 2021-09-13

## 2021-09-13 DIAGNOSIS — R05.9 COUGH: ICD-10-CM

## 2021-09-13 DIAGNOSIS — J01.00 ACUTE MAXILLARY SINUSITIS, RECURRENCE NOT SPECIFIED: Primary | ICD-10-CM

## 2021-09-13 PROCEDURE — 99213 OFFICE O/P EST LOW 20 MIN: CPT | Performed by: NURSE PRACTITIONER

## 2021-09-13 RX ORDER — DOXYCYCLINE 100 MG/1
100 CAPSULE ORAL 2 TIMES DAILY
Qty: 20 CAPSULE | Refills: 0 | Status: SHIPPED | OUTPATIENT
Start: 2021-09-13 | End: 2021-09-23

## 2021-09-13 RX ORDER — FLUOXETINE HYDROCHLORIDE 40 MG/1
40 CAPSULE ORAL DAILY
COMMUNITY
Start: 2021-08-02 | End: 2022-06-24 | Stop reason: ALTCHOICE

## 2021-09-13 RX ORDER — BENZONATATE 200 MG/1
200 CAPSULE ORAL 3 TIMES DAILY PRN
Qty: 21 CAPSULE | Refills: 0 | Status: SHIPPED | OUTPATIENT
Start: 2021-09-13 | End: 2021-12-14

## 2021-09-13 NOTE — PATIENT INSTRUCTIONS
Sinusitis, Adult  Sinusitis is inflammation of your sinuses. Sinuses are hollow spaces in the bones around your face. Your sinuses are located:  · Around your eyes.  · In the middle of your forehead.  · Behind your nose.  · In your cheekbones.  Mucus normally drains out of your sinuses. When your nasal tissues become inflamed or swollen, mucus can become trapped or blocked. This allows bacteria, viruses, and fungi to grow, which leads to infection. Most infections of the sinuses are caused by a virus.  Sinusitis can develop quickly. It can last for up to 4 weeks (acute) or for more than 12 weeks (chronic). Sinusitis often develops after a cold.  What are the causes?  This condition is caused by anything that creates swelling in the sinuses or stops mucus from draining. This includes:  · Allergies.  · Asthma.  · Infection from bacteria or viruses.  · Deformities or blockages in your nose or sinuses.  · Abnormal growths in the nose (nasal polyps).  · Pollutants, such as chemicals or irritants in the air.  · Infection from fungi (rare).  What increases the risk?  You are more likely to develop this condition if you:  · Have a weak body defense system (immune system).  · Do a lot of swimming or diving.  · Overuse nasal sprays.  · Smoke.  What are the signs or symptoms?  The main symptoms of this condition are pain and a feeling of pressure around the affected sinuses. Other symptoms include:  · Stuffy nose or congestion.  · Thick drainage from your nose.  · Swelling and warmth over the affected sinuses.  · Headache.  · Upper toothache.  · A cough that may get worse at night.  · Extra mucus that collects in the throat or the back of the nose (postnasal drip).  · Decreased sense of smell and taste.  · Fatigue.  · A fever.  · Sore throat.  · Bad breath.  How is this diagnosed?  This condition is diagnosed based on:  · Your symptoms.  · Your medical history.  · A physical exam.  · Tests to find out if your condition is  acute or chronic. This may include:  ? Checking your nose for nasal polyps.  ? Viewing your sinuses using a device that has a light (endoscope).  ? Testing for allergies or bacteria.  ? Imaging tests, such as an MRI or CT scan.  In rare cases, a bone biopsy may be done to rule out more serious types of fungal sinus disease.  How is this treated?  Treatment for sinusitis depends on the cause and whether your condition is chronic or acute.  · If caused by a virus, your symptoms should go away on their own within 10 days. You may be given medicines to relieve symptoms. They include:  ? Medicines that shrink swollen nasal passages (topical intranasal decongestants).  ? Medicines that treat allergies (antihistamines).  ? A spray that eases inflammation of the nostrils (topical intranasal corticosteroids).  ? Rinses that help get rid of thick mucus in your nose (nasal saline washes).  · If caused by bacteria, your health care provider may recommend waiting to see if your symptoms improve. Most bacterial infections will get better without antibiotic medicine. You may be given antibiotics if you have:  ? A severe infection.  ? A weak immune system.  · If caused by narrow nasal passages or nasal polyps, you may need to have surgery.  Follow these instructions at home:  Medicines  · Take, use, or apply over-the-counter and prescription medicines only as told by your health care provider. These may include nasal sprays.  · If you were prescribed an antibiotic medicine, take it as told by your health care provider. Do not stop taking the antibiotic even if you start to feel better.  Hydrate and humidify    · Drink enough fluid to keep your urine pale yellow. Staying hydrated will help to thin your mucus.  · Use a cool mist humidifier to keep the humidity level in your home above 50%.  · Inhale steam for 10-15 minutes, 3-4 times a day, or as told by your health care provider. You can do this in the bathroom while a hot shower is  running.  · Limit your exposure to cool or dry air.    Rest  · Rest as much as possible.  · Sleep with your head raised (elevated).  · Make sure you get enough sleep each night.  General instructions    · Apply a warm, moist washcloth to your face 3-4 times a day or as told by your health care provider. This will help with discomfort.  · Wash your hands often with soap and water to reduce your exposure to germs. If soap and water are not available, use hand .  · Do not smoke. Avoid being around people who are smoking (secondhand smoke).  · Keep all follow-up visits as told by your health care provider. This is important.    Contact a health care provider if:  · You have a fever.  · Your symptoms get worse.  · Your symptoms do not improve within 10 days.  Get help right away if:  · You have a severe headache.  · You have persistent vomiting.  · You have severe pain or swelling around your face or eyes.  · You have vision problems.  · You develop confusion.  · Your neck is stiff.  · You have trouble breathing.  Summary  · Sinusitis is soreness and inflammation of your sinuses. Sinuses are hollow spaces in the bones around your face.  · This condition is caused by nasal tissues that become inflamed or swollen. The swelling traps or blocks the flow of mucus. This allows bacteria, viruses, and fungi to grow, which leads to infection.  · If you were prescribed an antibiotic medicine, take it as told by your health care provider. Do not stop taking the antibiotic even if you start to feel better.  · Keep all follow-up visits as told by your health care provider. This is important.  This information is not intended to replace advice given to you by your health care provider. Make sure you discuss any questions you have with your health care provider.  Document Revised: 05/20/2019 Document Reviewed: 05/20/2019  [employer] Patient Education © 2021 [employer]    Cough, Adult  Coughing is a reflex that clears your  throat and your airways (respiratory system). Coughing helps to heal and protect your lungs. It is normal to cough occasionally, but a cough that happens with other symptoms or lasts a long time may be a sign of a condition that needs treatment. An acute cough may only last 2-3 weeks, while a chronic cough may last 8 or more weeks.  Coughing is commonly caused by:  · Infection of the respiratory systemby viruses or bacteria.  · Breathing in substances that irritate your lungs.  · Allergies.  · Asthma.  · Mucus that runs down the back of your throat (postnasal drip).  · Smoking.  · Acid backing up from the stomach into the esophagus (gastroesophageal reflux).  · Certain medicines.  · Chronic lung problems.  · Other medical conditions such as heart failure or a blood clot in the lung (pulmonary embolism).  Follow these instructions at home:  Medicines  · Take over-the-counter and prescription medicines only as told by your health care provider.  · Talk with your health care provider before you take a cough suppressant medicine.  Lifestyle    · Avoid cigarette smoke. Do not use any products that contain nicotine or tobacco, such as cigarettes, e-cigarettes, and chewing tobacco. If you need help quitting, ask your health care provider.  · Drink enough fluid to keep your urine pale yellow.  · Avoid caffeine.  · Do not drink alcohol if your health care provider tells you not to drink.    General instructions    · Pay close attention to changes in your cough. Tell your health care provider about them.  · Always cover your mouth when you cough.  · Avoid things that make you cough, such as perfume, candles, cleaning products, or campfire or tobacco smoke.  · If the air is dry, use a cool mist vaporizer or humidifier in your bedroom or your home to help loosen secretions.  · If your cough is worse at night, try to sleep in a semi-upright position.  · Rest as needed.  · Keep all follow-up visits as told by your health care  provider. This is important.    Contact a health care provider if you:  · Have new symptoms.  · Cough up pus.  · Have a cough that does not get better after 2-3 weeks or gets worse.  · Cannot control your cough with cough suppressant medicines and you are losing sleep.  · Have pain that gets worse or pain that is not helped with medicine.  · Have a fever.  · Have unexplained weight loss.  · Have night sweats.  Get help right away if:  · You cough up blood.  · You have difficulty breathing.  · Your heartbeat is very fast.  These symptoms may represent a serious problem that is an emergency. Do not wait to see if the symptoms will go away. Get medical help right away. Call your local emergency services (911 in the [place] Do not drive yourself to the hospital.  Summary  · Coughing is a reflex that clears your throat and your airways. It is normal to cough occasionally, but a cough that happens with other symptoms or lasts a long time may be a sign of a condition that needs treatment.  · Take over-the-counter and prescription medicines only as told by your health care provider.  · Always cover your mouth when you cough.  · Contact a health care provider if you have new symptoms or a cough that does not get better after 2-3 weeks or gets worse.  This information is not intended to replace advice given to you by your health care provider. Make sure you discuss any questions you have with your health care provider.  Document Revised: 01/06/2020 Document Reviewed: 01/06/2020  [employer] Patient Education © 2021 [employer]

## 2021-09-13 NOTE — PROGRESS NOTES
CHIEF COMPLAINT  Chief Complaint   Patient presents with   • URI         HPI  Sanjuana Allison is a 31 y.o. female  presents with complaint of 1 week history of nasal congestion with yellow/green discharge, PND, ear pressure, sore throat, persistent cough from PND, mild wheezing, mild shortness of breath.  Denies fever, loss of taste/smell.  Does have nebulizer at home that can use for wheezing.    Negative COVID-19 test yesterday    Review of Systems   Constitutional: Positive for fatigue. Negative for fever.   HENT: Positive for congestion, ear pain, sinus pressure, sinus pain and sore throat.    Respiratory: Positive for cough, shortness of breath and wheezing. Negative for chest tightness.    All other systems reviewed and are negative.      Past Medical History:   Diagnosis Date   • Acid reflux    • Allergic    • Anxiety    • Depression    • Gallbladder abscess    • Migraines    • Sinusitis    • Stomach ulcer        Family History   Problem Relation Age of Onset   • Arthritis Mother    • Diabetes Mother    • Cancer Mother         breat cancer   • Hypertension Mother    • Hypertension Father    • Hypertension Maternal Grandmother    • Heart disease Maternal Grandfather    • Hypertension Maternal Grandfather        Social History     Socioeconomic History   • Marital status:      Spouse name: Not on file   • Number of children: Not on file   • Years of education: Not on file   • Highest education level: Not on file   Tobacco Use   • Smoking status: Never Smoker   • Smokeless tobacco: Never Used   Substance and Sexual Activity   • Alcohol use: No   • Drug use: No   • Sexual activity: Defer     Birth control/protection: Injection         There were no vitals taken for this visit.    PHYSICAL EXAM  Physical Exam   Constitutional: She is oriented to person, place, and time. She appears well-developed and well-nourished. She does not have a sickly appearance. She appears ill.   HENT:   Head:  Normocephalic and atraumatic.   Bilateral maxillary sinus tenderness    Pulmonary/Chest: Effort normal.  No respiratory distress (persistent cough during visit; no audible wheezing).  Neurological: She is alert and oriented to person, place, and time.       Results for orders placed or performed during the hospital encounter of 09/12/21   COVID-19,APTIMA PANTHER([facility]),[facility], NP/OP SWAB IN UTM/VTM/[facility] TRANSPORT MEDIA,24 HR TAT - Swab, Nasal Cavity    Specimen: Nasal Cavity; Swab   Result Value Ref Range    COVID19 Not Detected Not Detected - Ref. Range       Diagnoses and all orders for this visit:    1. Acute maxillary sinusitis, recurrence not specified (Primary)  -     doxycycline (MONODOX) 100 MG capsule; Take 1 capsule by mouth 2 (Two) Times a Day for 10 days.  Dispense: 20 capsule; Refill: 0    2. Cough  -     benzonatate (TESSALON) 200 MG capsule; Take 1 capsule by mouth 3 (Three) Times a Day As Needed for Cough.  Dispense: 21 capsule; Refill: 0    --doxycycline--complete as prescribed; benzonatate for cough as prescribed  --increase fluids; tylenol or ibuprofen for pain/fever; warm compresses to areas of sinus tenderness; warm salt water gargles.  --if  No improvement in 5-7 days, will need follow-up for further evaluation      FOLLOW-UP  As discussed during visit with PCP/[facility] if no improvement or Urgent Care/Emergency Department if worsening of symptoms    Patient verbalizes understanding of medication dosage, comfort measures, instructions for treatment and follow-up.    MARIA GUADALUPE Sánchez  09/13/2021  14:23 EDT    This visit was performed via Telehealth.  This patient has been instructed to follow-up with their primary care provider if their symptoms worsen or the treatment provided does not resolve their illness.

## 2021-09-17 PROCEDURE — U0004 COV-19 TEST NON-CDC HGH THRU: HCPCS | Performed by: NURSE PRACTITIONER

## 2021-10-23 ENCOUNTER — HOSPITAL ENCOUNTER (EMERGENCY)
Facility: HOSPITAL | Age: 31
Discharge: HOME | End: 2021-10-23
Attending: EMERGENCY MEDICINE | Admitting: EMERGENCY MEDICINE

## 2021-10-23 ENCOUNTER — APPOINTMENT (OUTPATIENT)
Dept: CT IMAGING | Facility: HOSPITAL | Age: 31
End: 2021-10-23

## 2021-10-23 VITALS
HEART RATE: 104 BPM | WEIGHT: 262 LBS | HEIGHT: 65 IN | OXYGEN SATURATION: 93 % | TEMPERATURE: 98.7 F | SYSTOLIC BLOOD PRESSURE: 112 MMHG | RESPIRATION RATE: 17 BRPM | BODY MASS INDEX: 43.65 KG/M2 | DIASTOLIC BLOOD PRESSURE: 58 MMHG

## 2021-10-23 DIAGNOSIS — K52.9 GASTROENTERITIS: Primary | ICD-10-CM

## 2021-10-23 LAB
ALBUMIN SERPL-MCNC: 4.3 G/DL (ref 3.5–5.2)
ALBUMIN/GLOB SERPL: 1.2 G/DL
ALP SERPL-CCNC: 121 U/L (ref 39–117)
ALT SERPL W P-5'-P-CCNC: 25 U/L (ref 1–33)
AMYLASE SERPL-CCNC: 33 U/L (ref 28–100)
ANION GAP SERPL CALCULATED.3IONS-SCNC: 13 MMOL/L (ref 5–15)
AST SERPL-CCNC: 30 U/L (ref 1–32)
BACTERIA UR QL AUTO: ABNORMAL /HPF
BASOPHILS # BLD AUTO: 0.06 10*3/MM3 (ref 0–0.2)
BASOPHILS NFR BLD AUTO: 0.3 % (ref 0–1.5)
BILIRUB SERPL-MCNC: 0.4 MG/DL (ref 0–1.2)
BILIRUB UR QL STRIP: NEGATIVE
BUN SERPL-MCNC: 8 MG/DL (ref 6–20)
BUN/CREAT SERPL: 12.7 (ref 7–25)
CALCIUM SPEC-SCNC: 9 MG/DL (ref 8.6–10.5)
CHLORIDE SERPL-SCNC: 99 MMOL/L (ref 98–107)
CLARITY UR: ABNORMAL
CO2 SERPL-SCNC: 22 MMOL/L (ref 22–29)
COLOR UR: YELLOW
CREAT SERPL-MCNC: 0.63 MG/DL (ref 0.57–1)
D-LACTATE SERPL-SCNC: 1.4 MMOL/L (ref 0.5–2)
DEPRECATED RDW RBC AUTO: 41.1 FL (ref 37–54)
EOSINOPHIL # BLD AUTO: 0.23 10*3/MM3 (ref 0–0.4)
EOSINOPHIL NFR BLD AUTO: 1.2 % (ref 0.3–6.2)
ERYTHROCYTE [DISTWIDTH] IN BLOOD BY AUTOMATED COUNT: 15.9 % (ref 12.3–15.4)
GFR SERPL CREATININE-BSD FRML MDRD: 110 ML/MIN/1.73
GLOBULIN UR ELPH-MCNC: 3.7 GM/DL
GLUCOSE SERPL-MCNC: 122 MG/DL (ref 65–99)
GLUCOSE UR STRIP-MCNC: NEGATIVE MG/DL
HCG SERPL QL: NEGATIVE
HCT VFR BLD AUTO: 45.4 % (ref 34–46.6)
HGB BLD-MCNC: 14.4 G/DL (ref 12–15.9)
HGB UR QL STRIP.AUTO: ABNORMAL
HOLD SPECIMEN: NORMAL
HYALINE CASTS UR QL AUTO: ABNORMAL /LPF
IMM GRANULOCYTES # BLD AUTO: 0.12 10*3/MM3 (ref 0–0.05)
IMM GRANULOCYTES NFR BLD AUTO: 0.6 % (ref 0–0.5)
KETONES UR QL STRIP: NEGATIVE
LEUKOCYTE ESTERASE UR QL STRIP.AUTO: ABNORMAL
LIPASE SERPL-CCNC: 23 U/L (ref 13–60)
LYMPHOCYTES # BLD AUTO: 2.14 10*3/MM3 (ref 0.7–3.1)
LYMPHOCYTES NFR BLD AUTO: 11.3 % (ref 19.6–45.3)
MCH RBC QN AUTO: 23.8 PG (ref 26.6–33)
MCHC RBC AUTO-ENTMCNC: 31.7 G/DL (ref 31.5–35.7)
MCV RBC AUTO: 75.2 FL (ref 79–97)
MONOCYTES # BLD AUTO: 0.65 10*3/MM3 (ref 0.1–0.9)
MONOCYTES NFR BLD AUTO: 3.4 % (ref 5–12)
NEUTROPHILS NFR BLD AUTO: 15.81 10*3/MM3 (ref 1.7–7)
NEUTROPHILS NFR BLD AUTO: 83.2 % (ref 42.7–76)
NITRITE UR QL STRIP: NEGATIVE
NRBC BLD AUTO-RTO: 0 /100 WBC (ref 0–0.2)
PH UR STRIP.AUTO: 7.5 [PH] (ref 5–9)
PLATELET # BLD AUTO: 474 10*3/MM3 (ref 140–450)
PMV BLD AUTO: 8.6 FL (ref 6–12)
POTASSIUM SERPL-SCNC: 3.9 MMOL/L (ref 3.5–5.2)
PROT SERPL-MCNC: 8 G/DL (ref 6–8.5)
PROT UR QL STRIP: NEGATIVE
RBC # BLD AUTO: 6.04 10*6/MM3 (ref 3.77–5.28)
RBC # UR: ABNORMAL /HPF
REF LAB TEST METHOD: ABNORMAL
SODIUM SERPL-SCNC: 134 MMOL/L (ref 136–145)
SP GR UR STRIP: 1.02 (ref 1–1.03)
SQUAMOUS #/AREA URNS HPF: ABNORMAL /HPF
UROBILINOGEN UR QL STRIP: ABNORMAL
WBC # BLD AUTO: 19.01 10*3/MM3 (ref 3.4–10.8)
WBC UR QL AUTO: ABNORMAL /HPF
WHOLE BLOOD HOLD SPECIMEN: NORMAL

## 2021-10-23 PROCEDURE — 96365 THER/PROPH/DIAG IV INF INIT: CPT

## 2021-10-23 PROCEDURE — 25010000002 PROCHLORPERAZINE 10 MG/2ML SOLUTION: Performed by: NURSE PRACTITIONER

## 2021-10-23 PROCEDURE — 25010000002 IOPAMIDOL 61 % SOLUTION: Performed by: EMERGENCY MEDICINE

## 2021-10-23 PROCEDURE — 80053 COMPREHEN METABOLIC PANEL: CPT | Performed by: EMERGENCY MEDICINE

## 2021-10-23 PROCEDURE — 25010000002 MORPHINE PER 10 MG: Performed by: NURSE PRACTITIONER

## 2021-10-23 PROCEDURE — 25010000002 ONDANSETRON PER 1 MG

## 2021-10-23 PROCEDURE — 83605 ASSAY OF LACTIC ACID: CPT | Performed by: NURSE PRACTITIONER

## 2021-10-23 PROCEDURE — 96367 TX/PROPH/DG ADDL SEQ IV INF: CPT

## 2021-10-23 PROCEDURE — 96375 TX/PRO/DX INJ NEW DRUG ADDON: CPT

## 2021-10-23 PROCEDURE — 96376 TX/PRO/DX INJ SAME DRUG ADON: CPT

## 2021-10-23 PROCEDURE — 85025 COMPLETE CBC W/AUTO DIFF WBC: CPT | Performed by: EMERGENCY MEDICINE

## 2021-10-23 PROCEDURE — 99283 EMERGENCY DEPT VISIT LOW MDM: CPT

## 2021-10-23 PROCEDURE — 81001 URINALYSIS AUTO W/SCOPE: CPT | Performed by: EMERGENCY MEDICINE

## 2021-10-23 PROCEDURE — 83690 ASSAY OF LIPASE: CPT | Performed by: EMERGENCY MEDICINE

## 2021-10-23 PROCEDURE — 25010000002 LEVOFLOXACIN PER 250 MG: Performed by: NURSE PRACTITIONER

## 2021-10-23 PROCEDURE — 74177 CT ABD & PELVIS W/CONTRAST: CPT

## 2021-10-23 PROCEDURE — 84703 CHORIONIC GONADOTROPIN ASSAY: CPT | Performed by: EMERGENCY MEDICINE

## 2021-10-23 PROCEDURE — 82150 ASSAY OF AMYLASE: CPT | Performed by: NURSE PRACTITIONER

## 2021-10-23 PROCEDURE — 25010000002 ONDANSETRON PER 1 MG: Performed by: NURSE PRACTITIONER

## 2021-10-23 RX ORDER — PROCHLORPERAZINE EDISYLATE 5 MG/ML
5 INJECTION INTRAMUSCULAR; INTRAVENOUS EVERY 6 HOURS PRN
Status: DISCONTINUED | OUTPATIENT
Start: 2021-10-23 | End: 2021-10-24 | Stop reason: HOSPADM

## 2021-10-23 RX ORDER — ONDANSETRON 2 MG/ML
INJECTION INTRAMUSCULAR; INTRAVENOUS
Status: COMPLETED
Start: 2021-10-23 | End: 2021-10-23

## 2021-10-23 RX ORDER — ONDANSETRON 2 MG/ML
4 INJECTION INTRAMUSCULAR; INTRAVENOUS ONCE
Status: COMPLETED | OUTPATIENT
Start: 2021-10-23 | End: 2021-10-23

## 2021-10-23 RX ORDER — PROCHLORPERAZINE EDISYLATE 5 MG/ML
10 INJECTION INTRAMUSCULAR; INTRAVENOUS ONCE
Status: COMPLETED | OUTPATIENT
Start: 2021-10-23 | End: 2021-10-23

## 2021-10-23 RX ORDER — PROMETHAZINE HYDROCHLORIDE 25 MG/1
25 TABLET ORAL EVERY 6 HOURS PRN
Qty: 20 TABLET | Refills: 0 | Status: SHIPPED | OUTPATIENT
Start: 2021-10-23 | End: 2022-04-10

## 2021-10-23 RX ORDER — LEVOFLOXACIN 5 MG/ML
500 INJECTION, SOLUTION INTRAVENOUS EVERY 24 HOURS
Status: COMPLETED | OUTPATIENT
Start: 2021-10-23 | End: 2021-10-23

## 2021-10-23 RX ORDER — SODIUM CHLORIDE 0.9 % (FLUSH) 0.9 %
10 SYRINGE (ML) INJECTION AS NEEDED
Status: DISCONTINUED | OUTPATIENT
Start: 2021-10-23 | End: 2021-10-24 | Stop reason: HOSPADM

## 2021-10-23 RX ORDER — CIPROFLOXACIN 500 MG/1
500 TABLET, FILM COATED ORAL 2 TIMES DAILY
Qty: 20 TABLET | Refills: 0 | Status: SHIPPED | OUTPATIENT
Start: 2021-10-23 | End: 2021-11-02

## 2021-10-23 RX ORDER — ONDANSETRON 4 MG/1
4 TABLET, ORALLY DISINTEGRATING ORAL EVERY 8 HOURS PRN
Qty: 15 TABLET | Refills: 0 | Status: SHIPPED | OUTPATIENT
Start: 2021-10-23 | End: 2021-10-28

## 2021-10-23 RX ADMIN — SODIUM CHLORIDE 1000 ML: 9 INJECTION, SOLUTION INTRAVENOUS at 18:10

## 2021-10-23 RX ADMIN — METRONIDAZOLE 500 MG: 500 INJECTION, SOLUTION INTRAVENOUS at 20:08

## 2021-10-23 RX ADMIN — IOPAMIDOL 90 ML: 612 INJECTION, SOLUTION INTRAVENOUS at 19:11

## 2021-10-23 RX ADMIN — PROCHLORPERAZINE EDISYLATE 5 MG: 5 INJECTION INTRAMUSCULAR; INTRAVENOUS at 18:46

## 2021-10-23 RX ADMIN — MORPHINE SULFATE 4 MG: 4 INJECTION INTRAVENOUS at 20:01

## 2021-10-23 RX ADMIN — SODIUM CHLORIDE 1000 ML: 9 INJECTION, SOLUTION INTRAVENOUS at 19:59

## 2021-10-23 RX ADMIN — ONDANSETRON 4 MG: 2 INJECTION INTRAMUSCULAR; INTRAVENOUS at 20:01

## 2021-10-23 RX ADMIN — ONDANSETRON 4 MG: 2 INJECTION INTRAMUSCULAR; INTRAVENOUS at 17:55

## 2021-10-23 RX ADMIN — PROCHLORPERAZINE EDISYLATE 10 MG: 5 INJECTION INTRAMUSCULAR; INTRAVENOUS at 21:29

## 2021-10-23 RX ADMIN — MORPHINE SULFATE 4 MG: 4 INJECTION INTRAVENOUS at 18:10

## 2021-10-23 RX ADMIN — LEVOFLOXACIN 500 MG: 5 INJECTION, SOLUTION INTRAVENOUS at 20:57

## 2021-10-24 NOTE — ED PROVIDER NOTES
Subjective   31-year-old female in the emergency department complaining of epigastric tenderness.  Nausea vomiting diarrhea that started acutely this morning.  Nontoxic but ill-appearing in the emergency department the epigastric tenderness is reproducible with palpation.  She reports a history of GERD and cholecystectomy      History provided by:  Patient   used: No        Review of Systems   Constitutional: Negative for chills and fatigue.   HENT: Negative for congestion.    Respiratory: Negative for shortness of breath.    Cardiovascular: Negative for chest pain and palpitations.   Gastrointestinal: Positive for abdominal pain, diarrhea, nausea and vomiting.   Genitourinary: Negative for flank pain.   Musculoskeletal: Negative for arthralgias.   Skin: Negative for wound.   Allergic/Immunologic: Negative for immunocompromised state.   Neurological: Positive for weakness.   Hematological: Negative for adenopathy.   Psychiatric/Behavioral: Negative for confusion.   All other systems reviewed and are negative.      Past Medical History:   Diagnosis Date   • Acid reflux    • Allergic    • Anxiety    • Depression    • Gallbladder abscess    • Migraines    • Sinusitis    • Stomach ulcer        Allergies   Allergen Reactions   • Amiodarone    • Latex    • Nabumetone Nausea Only   • Penicillins Hives   • Sulfa Antibiotics Hives       Past Surgical History:   Procedure Laterality Date   • CHOLECYSTECTOMY     • ENDOSCOPY N/A 5/23/2018    Procedure: ESOPHAGOGASTRODUODENOSCOPY possible dilation;  Surgeon: Rafael Garcia MD;  Location: [facility] ENDOSCOPY;  Service: Gastroenterology   • FINGER NAIL SURGERY     • GALLBLADDER SURGERY     • TONSILLECTOMY AND ADENOIDECTOMY     • WISDOM TOOTH EXTRACTION         Family History   Problem Relation Age of Onset   • Arthritis Mother    • Diabetes Mother    • Cancer Mother         breat cancer   • Hypertension Mother    • Hypertension Father    • Hypertension Maternal  Grandmother    • Heart disease Maternal Grandfather    • Hypertension Maternal Grandfather        Social History     Socioeconomic History   • Marital status:    Tobacco Use   • Smoking status: Never Smoker   • Smokeless tobacco: Never Used   Substance and Sexual Activity   • Alcohol use: No   • Drug use: No   • Sexual activity: Defer     Birth control/protection: Injection           Objective   Physical Exam  Vitals and nursing note reviewed.   Constitutional:       Appearance: She is well-developed.   HENT:      Head: Normocephalic.      Nose: Nose normal.   Eyes:      Conjunctiva/sclera: Conjunctivae normal.      Pupils: Pupils are equal, round, and reactive to light.   Cardiovascular:      Rate and Rhythm: Regular rhythm. Tachycardia present.      Heart sounds: Normal heart sounds.   Pulmonary:      Effort: Pulmonary effort is normal.      Breath sounds: Normal breath sounds.   Abdominal:      Palpations: Abdomen is soft.      Tenderness: There is abdominal tenderness in the epigastric area.   Musculoskeletal:         General: Normal range of motion.      Cervical back: Normal range of motion.   Skin:     General: Skin is warm and dry.   Neurological:      Mental Status: She is alert and oriented to person, place, and time.      GCS: GCS eye subscore is 4. GCS verbal subscore is 5. GCS motor subscore is 6.         Procedures           ED Course      CT Abdomen Pelvis With Contrast   Final Result   1. No evidence of an acute intra-abdominal process.   2. Fluid in multiple nondilated large and small bowel loops   suggesting gastroenteritis.          Electronically signed by:  Jermaine Franklin MD  10/23/2021 7:31 PM   CDT Workstation: [id number]        Results for orders placed or performed during the hospital encounter of 10/23/21   Comprehensive Metabolic Panel    Specimen: Blood   Result Value Ref Range    Glucose 122 (H) 65 - 99 mg/dL    BUN 8 6 - 20 mg/dL    Creatinine 0.63 0.57 - 1.00 mg/dL    Sodium 134 (L)  136 - 145 mmol/L    Potassium 3.9 3.5 - 5.2 mmol/L    Chloride 99 98 - 107 mmol/L    CO2 22.0 22.0 - 29.0 mmol/L    Calcium 9.0 8.6 - 10.5 mg/dL    Total Protein 8.0 6.0 - 8.5 g/dL    Albumin 4.30 3.50 - 5.20 g/dL    ALT (SGPT) 25 1 - 33 U/L    AST (SGOT) 30 1 - 32 U/L    Alkaline Phosphatase 121 (H) 39 - 117 U/L    Total Bilirubin 0.4 0.0 - 1.2 mg/dL    eGFR Non African Amer 110 >60 mL/min/1.73    Globulin 3.7 gm/dL    A/G Ratio 1.2 g/dL    BUN/Creatinine Ratio 12.7 7.0 - 25.0    Anion Gap 13.0 5.0 - 15.0 mmol/L   Lipase    Specimen: Blood   Result Value Ref Range    Lipase 23 13 - 60 U/L   Urinalysis With Microscopic If Indicated (No Culture) - Urine, Clean Catch    Specimen: Urine, Clean Catch   Result Value Ref Range    Color, UA Yellow Yellow, Straw, Dark Yellow, Miri    Appearance, UA Cloudy (A) Clear    pH, UA 7.5 5.0 - 9.0    Specific Gravity, UA 1.021 1.003 - 1.030    Glucose, UA Negative Negative    Ketones, UA Negative Negative    Bilirubin, UA Negative Negative    Blood, UA Large (3+) (A) Negative    Protein, UA Negative Negative    Leuk Esterase, UA Moderate (2+) (A) Negative    Nitrite, UA Negative Negative    Urobilinogen, UA 0.2 E.U./dL 0.2 - 1.0 E.U./dL   hCG, Serum, Qualitative    Specimen: Blood   Result Value Ref Range    HCG Qualitative Negative Negative   CBC Auto Differential    Specimen: Blood   Result Value Ref Range    WBC 19.01 (H) 3.40 - 10.80 10*3/mm3    RBC 6.04 (H) 3.77 - 5.28 10*6/mm3    Hemoglobin 14.4 12.0 - 15.9 g/dL    Hematocrit 45.4 34.0 - 46.6 %    MCV 75.2 (L) 79.0 - 97.0 fL    MCH 23.8 (L) 26.6 - 33.0 pg    MCHC 31.7 31.5 - 35.7 g/dL    RDW 15.9 (H) 12.3 - 15.4 %    RDW-SD 41.1 37.0 - 54.0 fl    MPV 8.6 6.0 - 12.0 fL    Platelets 474 (H) 140 - 450 10*3/mm3    Neutrophil % 83.2 (H) 42.7 - 76.0 %    Lymphocyte % 11.3 (L) 19.6 - 45.3 %    Monocyte % 3.4 (L) 5.0 - 12.0 %    Eosinophil % 1.2 0.3 - 6.2 %    Basophil % 0.3 0.0 - 1.5 %    Immature Grans % 0.6 (H) 0.0 - 0.5 %     Neutrophils, Absolute 15.81 (H) 1.70 - 7.00 10*3/mm3    Lymphocytes, Absolute 2.14 0.70 - 3.10 10*3/mm3    Monocytes, Absolute 0.65 0.10 - 0.90 10*3/mm3    Eosinophils, Absolute 0.23 0.00 - 0.40 10*3/mm3    Basophils, Absolute 0.06 0.00 - 0.20 10*3/mm3    Immature Grans, Absolute 0.12 (H) 0.00 - 0.05 10*3/mm3    nRBC 0.0 0.0 - 0.2 /100 WBC   Amylase    Specimen: Blood   Result Value Ref Range    Amylase 33 28 - 100 U/L   Urinalysis, Microscopic Only - Urine, Clean Catch    Specimen: Urine, Clean Catch   Result Value Ref Range    RBC, UA 3-5 (A) None Seen /HPF    WBC, UA 3-5 None Seen, 0-2, 3-5 /HPF    Bacteria, UA 2+ (A) None Seen /HPF    Squamous Epithelial Cells, UA 3-5 (A) None Seen, 0-2 /HPF    Hyaline Casts, UA 0-2 None Seen /LPF    Methodology Automated Microscopy    Lactic Acid, Plasma    Specimen: Blood   Result Value Ref Range    Lactate 1.4 0.5 - 2.0 mmol/L   Green Top (Gel)   Result Value Ref Range    Extra Tube Hold for add-ons.    Lavender Top   Result Value Ref Range    Extra Tube hold for add-on                                           MDM  Number of Diagnoses or Management Options  Gastroenteritis: new and requires workup  Diagnosis management comments: 31-year-old female in the emergency department complaining abdominal pain nausea vomiting diarrhea of an acute onset.  See original HPI for detail.  Lengthy discussion with patient about her labs and leukocytosis and gastroenteritis.  She presented in significant amount of pain and was treated with morphine and IV fluids and her heart rate came down from the 140s into sinus rhythm fluctuating sinus tach at 110.  Offered patient admission due to leukocytosis, gastroenteritis IV fluid replacement IV antibiotics pain control.  Patient reports she would like to go home instead.  Patient was treated with IV Levaquin in the emergency department and discharged on p.o. Cipro Zofran and Phenergan for symptomatic treatment.  Patient's family member at bedside  both are agreeable with discharge.  Both verbalized understanding when I tell the patient that she needs to come back if the pain gets more significant, fever or any other symptoms that she cannot manage at home.  Nurse at bedside at discharge education       Amount and/or Complexity of Data Reviewed  Clinical lab tests: reviewed and ordered  Tests in the radiology section of CPT®: reviewed and ordered    Risk of Complications, Morbidity, and/or Mortality  Presenting problems: moderate  Diagnostic procedures: moderate  Management options: moderate    Patient Progress  Patient progress: stable      Final diagnoses:   Gastroenteritis       ED Disposition  ED Disposition     ED Disposition Condition Comment    Discharge Stable           Mariia Crowder MD  [address] [place] [address]  [phone]    Call in 3 days           Medication List      New Prescriptions    ciprofloxacin 500 MG tablet  Commonly known as: CIPRO  Take 1 tablet by mouth 2 (Two) Times a Day for 10 days.     ondansetron ODT 4 MG disintegrating tablet  Commonly known as: ZOFRAN-ODT  Place 1 tablet under the tongue Every 8 (Eight) Hours As Needed for Nausea or Vomiting for up to 5 days.     promethazine 25 MG tablet  Commonly known as: PHENERGAN  Take 1 tablet by mouth Every 6 (Six) Hours As Needed for Nausea or Vomiting.           Where to Get Your Medications      These medications were sent to [facility] [address] [place] [address], [place] - [address] [place] [address] [place] [facility] [address] - [phone]  - [phone] [address] [place] , [place] [id number]    Phone: [phone]   · ciprofloxacin 500 MG tablet  · ondansetron ODT 4 MG disintegrating tablet  · promethazine 25 MG tablet          Jermaine Briseno, APRN  10/23/21 2112

## 2021-12-14 ENCOUNTER — CONSULT (OUTPATIENT)
Dept: ONCOLOGY | Facility: CLINIC | Age: 31
End: 2021-12-14

## 2021-12-14 ENCOUNTER — LAB (OUTPATIENT)
Dept: ONCOLOGY | Facility: HOSPITAL | Age: 31
End: 2021-12-14

## 2021-12-14 VITALS
DIASTOLIC BLOOD PRESSURE: 85 MMHG | TEMPERATURE: 97.2 F | HEART RATE: 82 BPM | BODY MASS INDEX: 44.16 KG/M2 | WEIGHT: 265.4 LBS | SYSTOLIC BLOOD PRESSURE: 136 MMHG | OXYGEN SATURATION: 99 %

## 2021-12-14 DIAGNOSIS — D75.839 THROMBOCYTOSIS: ICD-10-CM

## 2021-12-14 DIAGNOSIS — R71.8 MICROCYTOSIS: ICD-10-CM

## 2021-12-14 DIAGNOSIS — D72.828 OTHER ELEVATED WHITE BLOOD CELL (WBC) COUNT: Primary | ICD-10-CM

## 2021-12-14 DIAGNOSIS — D72.828 OTHER ELEVATED WHITE BLOOD CELL (WBC) COUNT: ICD-10-CM

## 2021-12-14 PROBLEM — D72.829 LEUKOCYTOSIS: Status: ACTIVE | Noted: 2021-12-14

## 2021-12-14 LAB
BASOPHILS # BLD AUTO: 0.09 10*3/MM3 (ref 0–0.2)
BASOPHILS NFR BLD AUTO: 0.5 % (ref 0–1.5)
CRP SERPL-MCNC: 1.33 MG/DL (ref 0–0.5)
DEPRECATED RDW RBC AUTO: 41.2 FL (ref 37–54)
EOSINOPHIL # BLD AUTO: 0.54 10*3/MM3 (ref 0–0.4)
EOSINOPHIL NFR BLD AUTO: 3.2 % (ref 0.3–6.2)
ERYTHROCYTE [DISTWIDTH] IN BLOOD BY AUTOMATED COUNT: 15.9 % (ref 12.3–15.4)
FERRITIN SERPL-MCNC: 61.88 NG/ML (ref 13–150)
HCT VFR BLD AUTO: 46.4 % (ref 34–46.6)
HGB BLD-MCNC: 14.4 G/DL (ref 12–15.9)
HOLD SPECIMEN: NORMAL
IMM GRANULOCYTES # BLD AUTO: 0.11 10*3/MM3 (ref 0–0.05)
IMM GRANULOCYTES NFR BLD AUTO: 0.7 % (ref 0–0.5)
IRON 24H UR-MRATE: 40 MCG/DL (ref 37–145)
IRON SATN MFR SERPL: 8 % (ref 20–50)
LDH SERPL-CCNC: 255 U/L (ref 135–214)
LYMPHOCYTES # BLD AUTO: 4.34 10*3/MM3 (ref 0.7–3.1)
LYMPHOCYTES NFR BLD AUTO: 25.9 % (ref 19.6–45.3)
MCH RBC QN AUTO: 23.5 PG (ref 26.6–33)
MCHC RBC AUTO-ENTMCNC: 31 G/DL (ref 31.5–35.7)
MCV RBC AUTO: 75.8 FL (ref 79–97)
MONOCYTES # BLD AUTO: 0.75 10*3/MM3 (ref 0.1–0.9)
MONOCYTES NFR BLD AUTO: 4.5 % (ref 5–12)
NEUTROPHILS NFR BLD AUTO: 10.95 10*3/MM3 (ref 1.7–7)
NEUTROPHILS NFR BLD AUTO: 65.2 % (ref 42.7–76)
NRBC BLD AUTO-RTO: 0 /100 WBC (ref 0–0.2)
PLATELET # BLD AUTO: 493 10*3/MM3 (ref 140–450)
PMV BLD AUTO: 8.7 FL (ref 6–12)
RBC # BLD AUTO: 6.12 10*6/MM3 (ref 3.77–5.28)
TIBC SERPL-MCNC: 516 MCG/DL (ref 298–536)
TRANSFERRIN SERPL-MCNC: 346 MG/DL (ref 200–360)
WBC NRBC COR # BLD: 16.78 10*3/MM3 (ref 3.4–10.8)

## 2021-12-14 PROCEDURE — G0463 HOSPITAL OUTPT CLINIC VISIT: HCPCS | Performed by: INTERNAL MEDICINE

## 2021-12-14 PROCEDURE — 36415 COLL VENOUS BLD VENIPUNCTURE: CPT | Performed by: INTERNAL MEDICINE

## 2021-12-14 PROCEDURE — 86140 C-REACTIVE PROTEIN: CPT | Performed by: INTERNAL MEDICINE

## 2021-12-14 PROCEDURE — 84466 ASSAY OF TRANSFERRIN: CPT | Performed by: INTERNAL MEDICINE

## 2021-12-14 PROCEDURE — 99204 OFFICE O/P NEW MOD 45 MIN: CPT | Performed by: INTERNAL MEDICINE

## 2021-12-14 PROCEDURE — 82728 ASSAY OF FERRITIN: CPT | Performed by: INTERNAL MEDICINE

## 2021-12-14 PROCEDURE — 85025 COMPLETE CBC W/AUTO DIFF WBC: CPT | Performed by: INTERNAL MEDICINE

## 2021-12-14 PROCEDURE — 83540 ASSAY OF IRON: CPT | Performed by: INTERNAL MEDICINE

## 2021-12-14 PROCEDURE — 82607 VITAMIN B-12: CPT | Performed by: INTERNAL MEDICINE

## 2021-12-14 PROCEDURE — 82746 ASSAY OF FOLIC ACID SERUM: CPT | Performed by: INTERNAL MEDICINE

## 2021-12-14 PROCEDURE — 83615 LACTATE (LD) (LDH) ENZYME: CPT | Performed by: INTERNAL MEDICINE

## 2021-12-14 RX ORDER — ALBUTEROL SULFATE 90 UG/1
2 AEROSOL, METERED RESPIRATORY (INHALATION) AS NEEDED
COMMUNITY

## 2021-12-14 NOTE — PROGRESS NOTES
DATE OF CONSULT: 12/15/2021    REQUESTING SOURCE:  Sherry Patino, APRN  [address] [facility] [place] [address]      REASON FOR CONSULTATION: Leukocytosis, microcytosis, thrombocytosis      HISTORY OF PRESENT ILLNESS:   31-year-old female with medical problem consisting of anxiety/depression, migraine, seasonal allergies and sinus problem, irregular and heavy menstrual bleeding since menarche has been referred to [facility] for further evaluation and recommendation regarding abnormal CBC showing persistent leukocytosis, microcytosis and new onset of thrombocytosis.  She admits to history of heavy and irregular menstrual bleeding since menarche.  Denies any other bleeding.  Complains of chronic allergy and sinus issues requiring antibiotic as well as intermittent steroids.  Denies any new lymph node enlargement.  Admits to 15 pound of weight loss in the last 2 to 3 months.  Denies any drenching night sweats or any new lymph node enlargement.  Complains of arthralgias affecting bilateral elbows and bilateral hands.  Denies any new shortness of breath or chest pain.  Complains of intermittent palpitations.          PAST MEDICAL HISTORY:    Past Medical History:   Diagnosis Date   • Acid reflux    • Allergic    • Anxiety    • Depression    • Gallbladder abscess    • Migraines    • Sinusitis    • Stomach ulcer        PAST SURGICAL HISTORY:  Past Surgical History:   Procedure Laterality Date   • CHOLECYSTECTOMY     • ENDOSCOPY N/A 5/23/2018    Procedure: ESOPHAGOGASTRODUODENOSCOPY possible dilation;  Surgeon: Rafael Garcia MD;  Location: [facility] ENDOSCOPY;  Service: Gastroenterology   • FINGER NAIL SURGERY     • GALLBLADDER SURGERY     • TONSILLECTOMY AND ADENOIDECTOMY     • WISDOM TOOTH EXTRACTION         ALLERGIES:    Allergies   Allergen Reactions   • Amiodarone    • Flagyl [Metronidazole] Hives   • Latex    • Nabumetone Nausea Only   • Penicillins Hives   • Sulfa Antibiotics Hives        SOCIAL HISTORY:   Social History     Tobacco Use   • Smoking status: Never Smoker   • Smokeless tobacco: Never Used   Substance Use Topics   • Alcohol use: No   • Drug use: No       CURRENT MEDICATIONS:    Current Outpatient Medications   Medication Sig Dispense Refill   • albuterol sulfate  (90 Base) MCG/ACT inhaler Inhale 2 puffs As Needed for Wheezing.     • cyclobenzaprine (FLEXERIL) 5 MG tablet Take 5 mg by mouth As Needed for Muscle Spasms.     • FLUoxetine (PROzac) 40 MG capsule Take 40 mg by mouth Daily.     • hydrOXYzine pamoate (VISTARIL) 25 MG capsule As Needed.     • Prenatal Vit-Fe Fumarate-FA (PRENATAL PO) Take  by mouth Daily.     • fluticasone (FLONASE) 50 MCG/ACT nasal spray 2 sprays into the nostril(s) as directed by provider Daily. 16 g 0   • promethazine (PHENERGAN) 25 MG tablet Take 1 tablet by mouth Every 6 (Six) Hours As Needed for Nausea or Vomiting. 20 tablet 0     No current facility-administered medications for this visit.        HOME MEDICATIONS:   Current Outpatient Medications on File Prior to Visit   Medication Sig Dispense Refill   • albuterol sulfate  (90 Base) MCG/ACT inhaler Inhale 2 puffs As Needed for Wheezing.     • cyclobenzaprine (FLEXERIL) 5 MG tablet Take 5 mg by mouth As Needed for Muscle Spasms.     • FLUoxetine (PROzac) 40 MG capsule Take 40 mg by mouth Daily.     • hydrOXYzine pamoate (VISTARIL) 25 MG capsule As Needed.     • Prenatal Vit-Fe Fumarate-FA (PRENATAL PO) Take  by mouth Daily.     • fluticasone (FLONASE) 50 MCG/ACT nasal spray 2 sprays into the nostril(s) as directed by provider Daily. 16 g 0   • promethazine (PHENERGAN) 25 MG tablet Take 1 tablet by mouth Every 6 (Six) Hours As Needed for Nausea or Vomiting. 20 tablet 0     No current facility-administered medications on file prior to visit.       FAMILY HISTORY:    Family History   Problem Relation Age of Onset   • Arthritis Mother    • Diabetes Mother    • Cancer Mother         marie  cancer   • Hypertension Mother    • Hypertension Father    • Hypertension Maternal Grandmother    • Heart disease Maternal Grandfather    • Hypertension Maternal Grandfather        REVIEW OF SYSTEMS:        CONSTITUTIONAL:  Complains of fatigue.  Positive for 15 pound of intentional weight loss in last 2 to 3 months.  Denies any fever, chills .     EYES: No visual disturbances. No discharge. No new lesion.    ENMT:  No epistaxis, mouth sores or difficulty swallowing.    RESPIRATORY:  No new shortness of breath. No new cough or hemoptysis.    CARDIOVASCULAR: Positive for intermittent palpitation.  No chest pain .    GASTROINTESTINAL:  No abdominal pain nausea, vomiting or blood in the stool.    GENITOURINARY: No dysuria or hematuria.    MUSCULOSKELETAL: Positive for arthralgias affecting bilateral hands and elbows.    LYMPHATICS:  Denies any abnormal swollen glands anywhere in the body.    NEUROLOGICAL : No tingling or numbness. No headache or dizziness. No seizures or balance problems.    SKIN: No new skin lesion.    ENDOCRINE : No new hot or cold intolerance. No new polyuria. No polydipsia.        PHYSICAL EXAMINATION:      VITAL SIGNS:  /85   Pulse 82   Temp 97.2 °F (36.2 °C) (Temporal)   Wt 120 kg (265 lb 6.4 oz)   SpO2 99%   BMI 44.16 kg/m²       12/14/21  1446   Weight: 120 kg (265 lb 6.4 oz)       ECOG performance status: 1    CONSTITUTIONAL:  Not in any distress.    EYES: Mild conjunctival pallor. No Icterus. No pterygium. Extraocular movements intact. No ptosis.    ENMT:  Normocephalic, atraumatic. No facial asymmetry noted.    NECK:  No adenopathy. Trachea midline. No JVD.    RESPIRATORY:  Fair air entry bilateral. No rhonchi or wheezing. Fair respiratory effort.    CARDIOVASCULAR:  S1, S2. Regular rate and rhythm. No murmur or gallop appreciated.    ABDOMEN:  Soft, obese, nontender. Bowel sounds present in all four quadrants.  No hepatosplenomegaly appreciated.    MUSCULOSKELETAL:  No edema. No  calf tenderness. Normal range of motion.    NEUROLOGIC:    No motor or sensory deficit appreciated. Cranial nerves II-XII grossly intact.    SKIN : No new skin lesion identified. Skin is warm and dry to touch.    LYMPHATICS: No new enlarged lymph nodes in neck or supraclavicular area.    PSYCHIATRY: Alert, awake and oriented ×3. Normal affect.  Normal judgment.  Makes good eye contact.          DIAGNOSTIC DATA:    WBC   Date Value Ref Range Status   12/14/2021 16.78 (H) 3.40 - 10.80 10*3/mm3 Final     RBC   Date Value Ref Range Status   12/14/2021 6.12 (H) 3.77 - 5.28 10*6/mm3 Final     Hemoglobin   Date Value Ref Range Status   12/14/2021 14.4 12.0 - 15.9 g/dL Final     Hematocrit   Date Value Ref Range Status   12/14/2021 46.4 34.0 - 46.6 % Final     MCV   Date Value Ref Range Status   12/14/2021 75.8 (L) 79.0 - 97.0 fL Final     MCH   Date Value Ref Range Status   12/14/2021 23.5 (L) 26.6 - 33.0 pg Final     MCHC   Date Value Ref Range Status   12/14/2021 31.0 (L) 31.5 - 35.7 g/dL Final     RDW   Date Value Ref Range Status   12/14/2021 15.9 (H) 12.3 - 15.4 % Final     RDW-SD   Date Value Ref Range Status   12/14/2021 41.2 37.0 - 54.0 fl Final     MPV   Date Value Ref Range Status   12/14/2021 8.7 6.0 - 12.0 fL Final     Platelets   Date Value Ref Range Status   12/14/2021 493 (H) 140 - 450 10*3/mm3 Final     Neutrophil %   Date Value Ref Range Status   12/14/2021 65.2 42.7 - 76.0 % Final     Lymphocyte %   Date Value Ref Range Status   12/14/2021 25.9 19.6 - 45.3 % Final     Monocyte %   Date Value Ref Range Status   12/14/2021 4.5 (L) 5.0 - 12.0 % Final     Eosinophil %   Date Value Ref Range Status   12/14/2021 3.2 0.3 - 6.2 % Final     Basophil %   Date Value Ref Range Status   12/14/2021 0.5 0.0 - 1.5 % Final     Immature Grans %   Date Value Ref Range Status   12/14/2021 0.7 (H) 0.0 - 0.5 % Final     Neutrophils, Absolute   Date Value Ref Range Status   12/14/2021 10.95 (H) 1.70 - 7.00 10*3/mm3 Final      Lymphocytes, Absolute   Date Value Ref Range Status   12/14/2021 4.34 (H) 0.70 - 3.10 10*3/mm3 Final     Monocytes, Absolute   Date Value Ref Range Status   12/14/2021 0.75 0.10 - 0.90 10*3/mm3 Final     Eosinophils, Absolute   Date Value Ref Range Status   12/14/2021 0.54 (H) 0.00 - 0.40 10*3/mm3 Final     Basophils, Absolute   Date Value Ref Range Status   12/14/2021 0.09 0.00 - 0.20 10*3/mm3 Final     Immature Grans, Absolute   Date Value Ref Range Status   12/14/2021 0.11 (H) 0.00 - 0.05 10*3/mm3 Final     nRBC   Date Value Ref Range Status   12/14/2021 0.0 0.0 - 0.2 /100 WBC Final     Glucose   Date Value Ref Range Status   10/23/2021 122 (H) 65 - 99 mg/dL Final     Sodium   Date Value Ref Range Status   10/23/2021 134 (L) 136 - 145 mmol/L Final     Potassium   Date Value Ref Range Status   10/23/2021 3.9 3.5 - 5.2 mmol/L Final     Comment:     Slight hemolysis detected by analyzer. Results may be affected.     CO2   Date Value Ref Range Status   10/23/2021 22.0 22.0 - 29.0 mmol/L Final     Chloride   Date Value Ref Range Status   10/23/2021 99 98 - 107 mmol/L Final     Anion Gap   Date Value Ref Range Status   10/23/2021 13.0 5.0 - 15.0 mmol/L Final     Creatinine   Date Value Ref Range Status   10/23/2021 0.63 0.57 - 1.00 mg/dL Final     BUN   Date Value Ref Range Status   10/23/2021 8 6 - 20 mg/dL Final     BUN/Creatinine Ratio   Date Value Ref Range Status   10/23/2021 12.7 7.0 - 25.0 Final     Calcium   Date Value Ref Range Status   10/23/2021 9.0 8.6 - 10.5 mg/dL Final     eGFR Non  Amer   Date Value Ref Range Status   10/23/2021 110 >60 mL/min/1.73 Final     Alkaline Phosphatase   Date Value Ref Range Status   10/23/2021 121 (H) 39 - 117 U/L Final     Total Protein   Date Value Ref Range Status   10/23/2021 8.0 6.0 - 8.5 g/dL Final     ALT (SGPT)   Date Value Ref Range Status   10/23/2021 25 1 - 33 U/L Final     AST (SGOT)   Date Value Ref Range Status   10/23/2021 30 1 - 32 U/L Final      Total Bilirubin   Date Value Ref Range Status   10/23/2021 0.4 0.0 - 1.2 mg/dL Final     Albumin   Date Value Ref Range Status   10/23/2021 4.30 3.50 - 5.20 g/dL Final     Globulin   Date Value Ref Range Status   10/23/2021 3.7 gm/dL Final     Lab Results   Component Value Date    IRON 40 12/14/2021    TIBC 516 12/14/2021    LABIRON 8 (L) 12/14/2021    FERRITIN 61.88 12/14/2021    ZTOMDOVX64 513 12/14/2021    FOLATE >20.00 12/14/2021     No results found for: , LABCA2, AFPTM, HCGQUANT, , CHROMGRNA, 8CQIQ95ZIC, CEA, REFLABREPO]    Radiology Data :  No radiology results for the last 7 days    Pathology :  * Cannot find OR log *    ASSESSMENT AND PLAN:      1.  Leukocytosis:  -Patient has been having ongoing leukocytosis since 2014 with white blood cell count ranging between 12,000 and 19,000.  -On differentials predominant increase remains in neutrophils.  -Differential diagnosis for chronic leukocytosis includes reactive causes like inflammation versus splenectomy versus chronic infection versus bone marrow pathology.  -We will do blood work today consisting of CBC with differential, LDH, C-reactive protein, flow cytometry.  -Depending on initial work-up patient may need further work-up for myeloproliferative neoplasm which was discussed with patient.  If she needs any additional work-up we will call patient tomorrow for additional work-up.  -We will have patient return to clinic in 2 weeks to go over the result of blood work and further recommendation    2.  Microcytosis:  -Patient has a chronic microcytosis  -Denies any family history of sickle cell or thalassemia.  -We will do iron studies today.  If iron studies are within normal limit we will have to send out hemoglobin fractionation profile to rule out hemoglobinopathy.    3.  Thrombocytosis:  -If there is no evidence of iron deficiency we will need work-up for myeloproliferative neoplasm which was discussed with patient today    4.  Health  maintenance: Patient does not smoke currently            PHQ-9 Total Score: 18   Patient screened positive for depression based on a PHQ-9 score of 18 on 12/14/2021. Follow-up recommendations include: PCP managing depression.      Sanjuana Allison reports a pain score of 6.  Given her pain assessment as noted, treatment options were discussed and the following options were decided upon as a follow-up plan to address the patient's pain: continuation of current treatment plan for pain.             Thank you for this consultation.    Ankit Espinoza MD  12/15/2021  07:06 CST        Part of this note may be an electronic transcription/translation of spoken language to printed text using the Dragon Dictation System.

## 2021-12-15 LAB
FOLATE SERPL-MCNC: >20 NG/ML (ref 4.78–24.2)
REF LAB TEST METHOD: NORMAL
VIT B12 BLD-MCNC: 513 PG/ML (ref 211–946)

## 2021-12-15 RX ORDER — LANOLIN ALCOHOL/MO/W.PET/CERES
1000 CREAM (GRAM) TOPICAL DAILY
Qty: 90 TABLET | Refills: 1 | Status: SHIPPED | OUTPATIENT
Start: 2021-12-15

## 2021-12-15 RX ORDER — DOCUSATE SODIUM 100 MG/1
100 CAPSULE, LIQUID FILLED ORAL 2 TIMES DAILY PRN
Qty: 60 CAPSULE | Refills: 2 | Status: SHIPPED | OUTPATIENT
Start: 2021-12-15

## 2021-12-15 RX ORDER — FERROUS SULFATE 325(65) MG
325 TABLET ORAL
Qty: 30 TABLET | Refills: 3 | Status: SHIPPED | OUTPATIENT
Start: 2021-12-15 | End: 2022-03-23 | Stop reason: SDUPTHER

## 2021-12-16 ENCOUNTER — TELEPHONE (OUTPATIENT)
Dept: ONCOLOGY | Facility: HOSPITAL | Age: 31
End: 2021-12-16

## 2021-12-16 NOTE — TELEPHONE ENCOUNTER
Contacted pt regarding lab results and starting supplements. PT verbalizes understanding and denies any further questions.

## 2021-12-16 NOTE — TELEPHONE ENCOUNTER
----- Message from Ankit Espinoza MD sent at 12/15/2021  5:40 PM CST -----  Please let patient know, her iron level is low in blood.  Recommend starting ferrous sulfate and B12 p.o. daily.  I have sent prescription for ferrous sulfate, Colace and B12 to her pharmacy.  We will go over the result of blood work in detail upon next clinic visit.  No additional blood work required at present.  Thank you

## 2021-12-29 ENCOUNTER — OFFICE VISIT (OUTPATIENT)
Dept: ONCOLOGY | Facility: CLINIC | Age: 31
End: 2021-12-29

## 2021-12-29 VITALS
BODY MASS INDEX: 44.33 KG/M2 | WEIGHT: 266.4 LBS | SYSTOLIC BLOOD PRESSURE: 145 MMHG | HEART RATE: 85 BPM | DIASTOLIC BLOOD PRESSURE: 75 MMHG | OXYGEN SATURATION: 96 % | TEMPERATURE: 98.8 F

## 2021-12-29 DIAGNOSIS — R71.8 MICROCYTOSIS: Chronic | ICD-10-CM

## 2021-12-29 DIAGNOSIS — D72.828 OTHER ELEVATED WHITE BLOOD CELL (WBC) COUNT: Primary | Chronic | ICD-10-CM

## 2021-12-29 DIAGNOSIS — D75.839 THROMBOCYTOSIS: Chronic | ICD-10-CM

## 2021-12-29 PROBLEM — D72.829 LEUKOCYTOSIS: Chronic | Status: ACTIVE | Noted: 2021-12-14

## 2021-12-29 PROCEDURE — 99214 OFFICE O/P EST MOD 30 MIN: CPT | Performed by: INTERNAL MEDICINE

## 2021-12-29 PROCEDURE — G0463 HOSPITAL OUTPT CLINIC VISIT: HCPCS | Performed by: INTERNAL MEDICINE

## 2021-12-29 NOTE — PROGRESS NOTES
DATE OF VISIT: 12/29/2021      REASON FOR VISIT: Leukocytosis, microcytosis, thrombocytosis, iron deficiency      HISTORY OF PRESENT ILLNESS:   31-year-old female with medical problem consisting of anxiety/depression, migraine, seasonal allergies, irregular and heavy menstrual bleeding since menarche was initially seen in consultation on 12/14/2021 for abnormal CBC showing persistent leukocytosis, microcytosis and new onset of thrombocytosis. Patient was found to have iron deficiency for which she has been started on ferrous sulfate 1 tablet p.o. daily. Patient had a work-up done for evaluation of leukocytosis and other abnormality, she is here to discuss results and further recommendation. Complains of arthralgias affecting elbows and bilateral hand. Denies any new chest pain or shortness of breath.          Past Medical History, Past Surgical History, Social History, Family History have been reviewed and are without significant changes except as mentioned.    Review of Systems   Constitutional: Positive for fatigue.   Cardiovascular: Positive for palpitations.   Musculoskeletal: Positive for arthralgias (Affecting hands and elbows).   Hematological: Negative for adenopathy.      A comprehensive 14 point review of systems was performed and was negative except as mentioned.    Medications:  The current medication list was reviewed in the EMR    ALLERGIES:    Allergies   Allergen Reactions   • Amiodarone    • Flagyl [Metronidazole] Hives   • Latex    • Nabumetone Nausea Only   • Penicillins Hives   • Sulfa Antibiotics Hives       Objective      Vitals:    12/29/21 0813   BP: 145/75   Pulse: 85   Temp: 98.8 °F (37.1 °C)   SpO2: 96%   Weight: 121 kg (266 lb 6.4 oz)   PainSc:   3   PainLoc: Elbow  Comment: LEFT ELBOW PAIN     No flowsheet data found.    Physical Exam  Pulmonary:      Breath sounds: Normal breath sounds.   Neurological:      Mental Status: She is alert and oriented to person, place, and time.            RECENT LABS:  Glucose   Date Value Ref Range Status   10/23/2021 122 (H) 65 - 99 mg/dL Final     Sodium   Date Value Ref Range Status   10/23/2021 134 (L) 136 - 145 mmol/L Final     Potassium   Date Value Ref Range Status   10/23/2021 3.9 3.5 - 5.2 mmol/L Final     Comment:     Slight hemolysis detected by analyzer. Results may be affected.     CO2   Date Value Ref Range Status   10/23/2021 22.0 22.0 - 29.0 mmol/L Final     Chloride   Date Value Ref Range Status   10/23/2021 99 98 - 107 mmol/L Final     Anion Gap   Date Value Ref Range Status   10/23/2021 13.0 5.0 - 15.0 mmol/L Final     Creatinine   Date Value Ref Range Status   10/23/2021 0.63 0.57 - 1.00 mg/dL Final     BUN   Date Value Ref Range Status   10/23/2021 8 6 - 20 mg/dL Final     BUN/Creatinine Ratio   Date Value Ref Range Status   10/23/2021 12.7 7.0 - 25.0 Final     Calcium   Date Value Ref Range Status   10/23/2021 9.0 8.6 - 10.5 mg/dL Final     eGFR Non  Amer   Date Value Ref Range Status   10/23/2021 110 >60 mL/min/1.73 Final     Alkaline Phosphatase   Date Value Ref Range Status   10/23/2021 121 (H) 39 - 117 U/L Final     Total Protein   Date Value Ref Range Status   10/23/2021 8.0 6.0 - 8.5 g/dL Final     ALT (SGPT)   Date Value Ref Range Status   10/23/2021 25 1 - 33 U/L Final     AST (SGOT)   Date Value Ref Range Status   10/23/2021 30 1 - 32 U/L Final     Total Bilirubin   Date Value Ref Range Status   10/23/2021 0.4 0.0 - 1.2 mg/dL Final     Albumin   Date Value Ref Range Status   10/23/2021 4.30 3.50 - 5.20 g/dL Final     Globulin   Date Value Ref Range Status   10/23/2021 3.7 gm/dL Final     Lab Results   Component Value Date    WBC 16.78 (H) 12/14/2021    HGB 14.4 12/14/2021    HCT 46.4 12/14/2021    MCV 75.8 (L) 12/14/2021     (H) 12/14/2021     Lab Results   Component Value Date    NEUTROABS 10.95 (H) 12/14/2021    IRON 40 12/14/2021    TIBC 516 12/14/2021    LABIRON 8 (L) 12/14/2021    FERRITIN 61.88 12/14/2021     CNBHAQLZ31 513 12/14/2021    FOLATE >20.00 12/14/2021     No results found for: , LABCA2, AFPTM, HCGQUANT, , CHROMGRNA, 1LEXP28VLD, CEA, REFLABREPO      C-reactive Protein  Order: [id number]   Status: Final result     Visible to patient: Yes (seen)     Next appt: None     Dx: Other elevated white blood cell (WBC)...    Specimen Information: Blood         1 Result Note     1 Follow-up Encounter    Component   Ref Range & Units 2 wk ago   C-Reactive Protein   0.00 - 0.50 mg/dL 1.33 High     Resulting Agency [facility]              Specimen Collected: 12/14/21 15:20 Last Resulted: 12/14/21 16:34                 Peripheral blood flow cytometry done on 12/14/2021 showed:          PATHOLOGY:  * Cannot find OR log *         RADIOLOGY DATA :  No radiology results for the last 7 days          Assessment/Plan     1. Leukocytosis:  -Patient has been having ongoing leukocytosis since 2014 with white blood cell count ranging between 12,000 and 19,000.  -CBC done on 12/14/2021 showed white blood cell count was 16.7 which was again predominantly increase in neutrophils.  -C-reactive protein was elevated at 1.33 consistent with inflammatory cause  -Peripheral blood flow cytometry done on 12/14/2021 does not show any evidence of any leukemia or lymphoma which was discussed with patient.  -Recommend continue with clinical monitoring for now.  -We will have patient return to clinic in 3 months with repeat CBC, iron studies, ferritin, B12, folate to be done prior to that  -If patient has any worsening leukocytosis in future neck step in the evaluation would be bone marrow biopsy which was discussed with patient today    2. Microcytosis:  -Iron studies done on 12/14/2021 is consistent with iron deficiency.  -Iron saturation was 8% with ferritin of 516  -Has been started on ferrous sulfate 1 tablet daily along with B12 p.o. daily  -We will repeat anemia work-up upon next clinic visit in 3 months.      3.  Thrombocytosis  -Reactive due to iron deficiency  -We will replace iron deficiency first prior to any further work-up for myeloproliferative neoplasm which was discussed with patient today.    4. Health maintenance: Patient does not smoke currently                     PHQ-9 Total Score: 2   -Patient is not homicidal or suicidal.  No acute intervention required.    Sanjuana Allison reports a pain score of 3.  Given her pain assessment as noted, treatment options were discussed and the following options were decided upon as a follow-up plan to address the patient's pain: continuation of current treatment plan for pain.         Ankit Espinoza MD  12/29/2021  08:32 CST        Part of this note may be an electronic transcription/translation of spoken language to printed text using the Dragon Dictation System.          CC:

## 2022-01-11 ENCOUNTER — TELEMEDICINE (OUTPATIENT)
Dept: FAMILY MEDICINE CLINIC | Facility: TELEHEALTH | Age: 32
End: 2022-01-11

## 2022-01-11 VITALS — TEMPERATURE: 99 F

## 2022-01-11 DIAGNOSIS — J01.00 ACUTE NON-RECURRENT MAXILLARY SINUSITIS: Primary | ICD-10-CM

## 2022-01-11 PROCEDURE — 99213 OFFICE O/P EST LOW 20 MIN: CPT | Performed by: NURSE PRACTITIONER

## 2022-01-11 RX ORDER — BENZONATATE 100 MG/1
CAPSULE ORAL
Qty: 30 CAPSULE | Refills: 0 | Status: SHIPPED | OUTPATIENT
Start: 2022-01-11 | End: 2022-04-10

## 2022-01-11 RX ORDER — DOXYCYCLINE HYCLATE 100 MG/1
100 CAPSULE ORAL 2 TIMES DAILY
Qty: 20 CAPSULE | Refills: 0 | Status: SHIPPED | OUTPATIENT
Start: 2022-01-11 | End: 2022-01-21

## 2022-01-11 RX ORDER — METHYLPREDNISOLONE 4 MG/1
TABLET ORAL
Qty: 21 TABLET | Refills: 0 | OUTPATIENT
Start: 2022-01-11 | End: 2022-02-11

## 2022-01-11 NOTE — PROGRESS NOTES
You have chosen to receive care through a telehealth visit.  Do you consent to use a video/audio connection for your medical care today? Yes     CHIEF COMPLAINT  No chief complaint on file.        HPI  Sanjuana Allison is a 31 y.o. female  presents with complaint of 1 week history of sinus congestion, post nasal drip. Reports 3 days ago she had nausea and vomiting that since resolved. + low grade fever and chills. No nausea vomiting at this time. No SOA or CP. Denies any loss of taste or smell. Reports she has taken 2 at home COVID test negative. Reports she has been blowing her nose and having some yellow mixed with alittle blood in her mucous. Reports she has a nebulizer and has used that a couple times.Reports she has been using tussin at night for her cough. Cough is worse at night.     Review of Systems   Constitutional: Positive for chills and fever. Negative for fatigue.   HENT: Positive for congestion, sinus pressure, sinus pain and sore throat. Negative for ear discharge and ear pain.    Respiratory: Positive for cough. Negative for chest tightness, shortness of breath and wheezing.    Cardiovascular: Negative for chest pain.   Gastrointestinal: Positive for nausea and vomiting. Negative for diarrhea.        Reports nausea vomiting multiple time 3 days ago none since   Musculoskeletal: Positive for myalgias. Negative for back pain.   Neurological: Negative for dizziness and headaches.   Psychiatric/Behavioral: Negative.        Past Medical History:   Diagnosis Date   • Acid reflux    • Allergic    • Anxiety    • Depression    • Gallbladder abscess    • Migraines    • Sinusitis    • Stomach ulcer        Family History   Problem Relation Age of Onset   • Arthritis Mother    • Diabetes Mother    • Cancer Mother         breat cancer   • Hypertension Mother    • Hypertension Father    • Hypertension Maternal Grandmother    • Heart disease Maternal Grandfather    • Hypertension Maternal Grandfather         Social History     Socioeconomic History   • Marital status:    Tobacco Use   • Smoking status: Never Smoker   • Smokeless tobacco: Never Used   Substance and Sexual Activity   • Alcohol use: No   • Drug use: No   • Sexual activity: Defer     Birth control/protection: Injection         LMP 01/10/2022   Breastfeeding No     PHYSICAL EXAM(patient guided exam)  Physical Exam   Constitutional: She is oriented to person, place, and time. She appears well-developed and well-nourished. No distress.   HENT:   Head: Normocephalic and atraumatic.   Right Ear: Hearing normal.   Left Ear: Hearing normal.   Nose: Congestion present. Right sinus exhibits maxillary sinus tenderness. Left sinus exhibits maxillary sinus tenderness.   Mouth/Throat: Mouth/Lips are normal.Oropharynx is clear and moist.   Eyes: Conjunctivae and lids are normal.   Pulmonary/Chest: Effort normal.  No respiratory distress.  Lymphadenopathy:        Right cervical: No anterior cervical adenopathy present.       Left cervical: No anterior cervical adenopathy present.   Neurological: She is alert and oriented to person, place, and time. She has normal strength.   Skin:   Skin turgor good    Psychiatric: She has a normal mood and affect. Her speech is normal and behavior is normal.       Results for orders placed or performed in visit on 12/14/21   Lactate Dehydrogenase    Specimen: Blood   Result Value Ref Range     (H) 135 - 214 U/L   Iron and TIBC    Specimen: Blood   Result Value Ref Range    Iron 40 37 - 145 mcg/dL    Iron Saturation 8 (L) 20 - 50 %    Transferrin 346 200 - 360 mg/dL    TIBC 516 298 - 536 mcg/dL   Ferritin    Specimen: Blood   Result Value Ref Range    Ferritin 61.88 13.00 - 150.00 ng/mL   Folate    Specimen: Blood   Result Value Ref Range    Folate >20.00 4.78 - 24.20 ng/mL   Vitamin B12    Specimen: Blood   Result Value Ref Range    Vitamin B-12 513 211 - 946 pg/mL   C-reactive Protein    Specimen: Blood   Result  Value Ref Range    C-Reactive Protein 1.33 (H) 0.00 - 0.50 mg/dL   Flow Cytometry, Blood    Specimen: Blood   Result Value Ref Range    Reference Lab Report     CBC Auto Differential    Specimen: Blood   Result Value Ref Range    WBC 16.78 (H) 3.40 - 10.80 10*3/mm3    RBC 6.12 (H) 3.77 - 5.28 10*6/mm3    Hemoglobin 14.4 12.0 - 15.9 g/dL    Hematocrit 46.4 34.0 - 46.6 %    MCV 75.8 (L) 79.0 - 97.0 fL    MCH 23.5 (L) 26.6 - 33.0 pg    MCHC 31.0 (L) 31.5 - 35.7 g/dL    RDW 15.9 (H) 12.3 - 15.4 %    RDW-SD 41.2 37.0 - 54.0 fl    MPV 8.7 6.0 - 12.0 fL    Platelets 493 (H) 140 - 450 10*3/mm3    Neutrophil % 65.2 42.7 - 76.0 %    Lymphocyte % 25.9 19.6 - 45.3 %    Monocyte % 4.5 (L) 5.0 - 12.0 %    Eosinophil % 3.2 0.3 - 6.2 %    Basophil % 0.5 0.0 - 1.5 %    Immature Grans % 0.7 (H) 0.0 - 0.5 %    Neutrophils, Absolute 10.95 (H) 1.70 - 7.00 10*3/mm3    Lymphocytes, Absolute 4.34 (H) 0.70 - 3.10 10*3/mm3    Monocytes, Absolute 0.75 0.10 - 0.90 10*3/mm3    Eosinophils, Absolute 0.54 (H) 0.00 - 0.40 10*3/mm3    Basophils, Absolute 0.09 0.00 - 0.20 10*3/mm3    Immature Grans, Absolute 0.11 (H) 0.00 - 0.05 10*3/mm3    nRBC 0.0 0.0 - 0.2 /100 WBC   Green Top (Gel)   Result Value Ref Range    Extra Tube Hold for add-ons.        Diagnoses and all orders for this visit:    1. Acute non-recurrent maxillary sinusitis (Primary)    Other orders  -     doxycycline (VIBRAMYCIN) 100 MG capsule; Take 1 capsule by mouth 2 (Two) Times a Day for 10 days.  Dispense: 20 capsule; Refill: 0  -     methylPREDNISolone (MEDROL) 4 MG dose pack; Take as directed on package instructions.  Dispense: 21 tablet; Refill: 0  -     benzonatate (Tessalon Perles) 100 MG capsule; Take 1 to 2 perles tid prn cough  Dispense: 30 capsule; Refill: 0    rest  Fluids  Quarantine for 5 days- declines another COVID test  PCP if symptoms persist  ER for worsening symptoms such as high fever, chest pain or SOA.       FOLLOW-UP  As discussed during visit with PCP/Virtual  Care if no improvement or Urgent Care/Emergency Department if worsening of symptoms    Patient verbalizes understanding of medication dosage, comfort measures, instructions for treatment and follow-up.    Tressa Jones, APRN  01/11/2022  14:57 EST    This visit was performed via Telehealth.  This patient has been instructed to follow-up with their primary care provider if their symptoms worsen or the treatment provided does not resolve their illness.

## 2022-01-12 NOTE — PATIENT INSTRUCTIONS
Sinusitis, Adult  Sinusitis is inflammation of your sinuses. Sinuses are hollow spaces in the bones around your face. Your sinuses are located:  · Around your eyes.  · In the middle of your forehead.  · Behind your nose.  · In your cheekbones.  Mucus normally drains out of your sinuses. When your nasal tissues become inflamed or swollen, mucus can become trapped or blocked. This allows bacteria, viruses, and fungi to grow, which leads to infection. Most infections of the sinuses are caused by a virus.  Sinusitis can develop quickly. It can last for up to 4 weeks (acute) or for more than 12 weeks (chronic). Sinusitis often develops after a cold.  What are the causes?  This condition is caused by anything that creates swelling in the sinuses or stops mucus from draining. This includes:  · Allergies.  · Asthma.  · Infection from bacteria or viruses.  · Deformities or blockages in your nose or sinuses.  · Abnormal growths in the nose (nasal polyps).  · Pollutants, such as chemicals or irritants in the air.  · Infection from fungi (rare).  What increases the risk?  You are more likely to develop this condition if you:  · Have a weak body defense system (immune system).  · Do a lot of swimming or diving.  · Overuse nasal sprays.  · Smoke.  What are the signs or symptoms?  The main symptoms of this condition are pain and a feeling of pressure around the affected sinuses. Other symptoms include:  · Stuffy nose or congestion.  · Thick drainage from your nose.  · Swelling and warmth over the affected sinuses.  · Headache.  · Upper toothache.  · A cough that may get worse at night.  · Extra mucus that collects in the throat or the back of the nose (postnasal drip).  · Decreased sense of smell and taste.  · Fatigue.  · A fever.  · Sore throat.  · Bad breath.  How is this diagnosed?  This condition is diagnosed based on:  · Your symptoms.  · Your medical history.  · A physical exam.  · Tests to find out if your condition is  acute or chronic. This may include:  ? Checking your nose for nasal polyps.  ? Viewing your sinuses using a device that has a light (endoscope).  ? Testing for allergies or bacteria.  ? Imaging tests, such as an MRI or CT scan.  In rare cases, a bone biopsy may be done to rule out more serious types of fungal sinus disease.  How is this treated?  Treatment for sinusitis depends on the cause and whether your condition is chronic or acute.  · If caused by a virus, your symptoms should go away on their own within 10 days. You may be given medicines to relieve symptoms. They include:  ? Medicines that shrink swollen nasal passages (topical intranasal decongestants).  ? Medicines that treat allergies (antihistamines).  ? A spray that eases inflammation of the nostrils (topical intranasal corticosteroids).  ? Rinses that help get rid of thick mucus in your nose (nasal saline washes).  · If caused by bacteria, your health care provider may recommend waiting to see if your symptoms improve. Most bacterial infections will get better without antibiotic medicine. You may be given antibiotics if you have:  ? A severe infection.  ? A weak immune system.  · If caused by narrow nasal passages or nasal polyps, you may need to have surgery.  Follow these instructions at home:  Medicines  · Take, use, or apply over-the-counter and prescription medicines only as told by your health care provider. These may include nasal sprays.  · If you were prescribed an antibiotic medicine, take it as told by your health care provider. Do not stop taking the antibiotic even if you start to feel better.  Hydrate and humidify    · Drink enough fluid to keep your urine pale yellow. Staying hydrated will help to thin your mucus.  · Use a cool mist humidifier to keep the humidity level in your home above 50%.  · Inhale steam for 10-15 minutes, 3-4 times a day, or as told by your health care provider. You can do this in the bathroom while a hot shower is  running.  · Limit your exposure to cool or dry air.    Rest  · Rest as much as possible.  · Sleep with your head raised (elevated).  · Make sure you get enough sleep each night.  General instructions    · Apply a warm, moist washcloth to your face 3-4 times a day or as told by your health care provider. This will help with discomfort.  · Wash your hands often with soap and water to reduce your exposure to germs. If soap and water are not available, use hand .  · Do not smoke. Avoid being around people who are smoking (secondhand smoke).  · Keep all follow-up visits as told by your health care provider. This is important.    Contact a health care provider if:  · You have a fever.  · Your symptoms get worse.  · Your symptoms do not improve within 10 days.  Get help right away if:  · You have a severe headache.  · You have persistent vomiting.  · You have severe pain or swelling around your face or eyes.  · You have vision problems.  · You develop confusion.  · Your neck is stiff.  · You have trouble breathing.  Summary  · Sinusitis is soreness and inflammation of your sinuses. Sinuses are hollow spaces in the bones around your face.  · This condition is caused by nasal tissues that become inflamed or swollen. The swelling traps or blocks the flow of mucus. This allows bacteria, viruses, and fungi to grow, which leads to infection.  · If you were prescribed an antibiotic medicine, take it as told by your health care provider. Do not stop taking the antibiotic even if you start to feel better.  · Keep all follow-up visits as told by your health care provider. This is important.  This information is not intended to replace advice given to you by your health care provider. Make sure you discuss any questions you have with your health care provider.  Document Revised: 05/20/2019 Document Reviewed: 05/20/2019  [employer] Patient Education © 2021 [employer]    How to Quarantine at Home  Information for Patients and  Families    These instructions are for people with confirmed or suspected COVID-19 who do not need to be hospitalized and those with confirmed COVID-19 who were hospitalized and discharged to care for themselves at home.    If you were tested through the Health Department  The Health Department will monitor your wellbeing.  If it is determined that you do not need to be hospitalized and can be isolated at home, you will be monitored by staff from your local or state health department.     If you were tested through a [facility]  You will need to monitor yourself and report changes in your symptoms to your doctor.  See the section below called Monitor Your Symptoms.    Follow these steps until a healthcare provider or local or state health department says you can return to your normal activities.    Stay home except to get medical care  • Restrict activities outside your home, except for getting medical care.   • Do not go to work, school, or public areas.   • Avoid using public transportation, ride-sharing, or taxis.    Separate yourself from other people and animals in your home  People  As much as possible, you should stay in a specific room and away from other people in your home. Also, you should use a separate bathroom, if available.    Animals  You should restrict contact with pets and other animals while you are sick with COVID-19, just like you would around other people. When possible, have another member of your household care for your animals while you are sick. If you are sick with COVID-19, avoid contact with your pet, including petting, snuggling, being kissed or licked, and sharing food. If you must care for your pet or be around animals while you are sick, wash your hands before and after you interact with pets and wear a facemask. See COVID-19 and Animals for more information.    Call ahead before visiting your doctor  If you have a medical appointment, call the healthcare provider and tell them  that you have or may have COVID-19. This information will help the healthcare provider’s office take steps to keep other people from getting infected or exposed.    Wear a facemask  You should wear a facemask when you are around other people (e.g., sharing a room or vehicle) or pets and before you enter a healthcare provider’s office.     If you are not able to wear a facemask (for example, because it causes trouble breathing), then people who live with you should not stay in the same room with you, or they should wear a facemask if they enter your room.    Cover your coughs and sneezes  • Cover your mouth and nose with a tissue when you cough or sneeze.   • Throw used tissues in a lined trash can.   • Immediately wash your hands with soap and water for at least 20 seconds or, if soap and water are not available, clean your hands with an alcohol-based hand  that contains at least 60% alcohol.    Clean your hands often  • Wash your hands often with soap and water for at least 20 seconds, especially after blowing your nose, coughing, or sneezing; going to the bathroom; and before eating or preparing food.     • If soap and water are not readily available, use an alcohol-based hand  with at least 60% alcohol, covering all surfaces of your hands and rubbing them together until they feel dry.    • Soap and water are the best option if hands are visibly dirty. Avoid touching your eyes, nose, and mouth with unwashed hands.    Avoid sharing personal household items  • You should not share dishes, drinking glasses, cups, eating utensils, towels, or bedding with other people or pets in your home.   • After using these items, they should be washed thoroughly with soap and water.    Clean all “high-touch” surfaces everyday  • High touch surfaces include counters, tabletops, doorknobs, bathroom fixtures, toilets, phones, keyboards, tablets, and bedside tables.   • Also, clean any surfaces that may have blood,  stool, or body fluids on them.   • Use a household cleaning spray or wipe, according to the label instructions. Labels contain instructions for safe and effective use of the cleaning product, including precautions you should take when applying the product, such as wearing gloves and making sure you have good ventilation during use of the product.    Monitor your symptoms  • Seek prompt medical attention if your illness is worsening (e.g., difficulty breathing).   • Before seeking care, call your healthcare provider and tell them that you have, or are being evaluated for, COVID-19.   • Put on a facemask before you enter the facility.     • These steps will help the healthcare provider’s office to keep other people in the office or waiting room from getting infected or exposed.   • Persons who are placed under active monitoring or facilitated self-monitoring should follow instructions provided by their local health department or occupational health professionals, as appropriate.  • If you have a medical emergency and need to call 911, notify the dispatch personnel that you have, or are being evaluated for COVID-19. If possible, put on a facemask before emergency medical services arrive.    Discontinuing home isolation  Patients with confirmed COVID-19 should remain under home isolation precautions until the risk of secondary transmission to others is thought to be low. The decision to discontinue home isolation precautions should be made on a case-by-case basis, in consultation with healthcare providers and state and local health departments.    The below content are for household members, intimate partners, and caregivers of a patient with symptomatic laboratory-confirmed COVID-19 or a patient under investigation:    Household members, intimate partners, and caregivers may have close contact with a person with symptomatic, laboratory-confirmed COVID-19 or a person under investigation.     Close contacts should  monitor their health; they should call their healthcare provider right away if they develop symptoms suggestive of COVID-19 (e.g., fever, cough, shortness of breath)     Close contacts should also follow these recommendations:  • Make sure that you understand and can help the patient follow their healthcare provider’s instructions for medication(s) and care. You should help the patient with basic needs in the home and provide support for getting groceries, prescriptions, and other personal needs.  • Monitor the patient’s symptoms. If the patient is getting sicker, call his or her healthcare provider and tell them that the patient has laboratory-confirmed COVID-19. This will help the healthcare provider’s office take steps to keep other people in the office or waiting room from getting infected. Ask the healthcare provider to call the local or state health department for additional guidance. If the patient has a medical emergency and you need to call 911, notify the dispatch personnel that the patient has, or is being evaluated for COVID-19.  • Household members should stay in another room or be  from the patient as much as possible. Household members should use a separate bedroom and bathroom, if available.  • Prohibit visitors who do not have an essential need to be in the home.  • Household members should care for any pets in the home. Do not handle pets or other animals while sick.  For more information, see COVID-19 and Animals.  • Make sure that shared spaces in the home have good air flow, such as by an air conditioner or an opened window, weather permitting.  • Perform hand hygiene frequently. Wash your hands often with soap and water for at least 20 seconds or use an alcohol-based hand  that contains 60 to 95% alcohol, covering all surfaces of your hands and rubbing them together until they feel dry. Soap and water should be used preferentially if hands are visibly dirty.  • Avoid touching  your eyes, nose, and mouth with unwashed hands.  • The patient should wear a facemask when you are around other people. If the patient is not able to wear a facemask (for example, because it causes trouble breathing), you, as the caregiver, should wear a mask when you are in the same room as the patient.  • Wear a disposable facemask and gloves when you touch or have contact with the patient’s blood, stool, or body fluids, such as saliva, sputum, nasal mucus, vomit, or urine.   o Throw out disposable facemasks and gloves after using them. Do not reuse.  o When removing personal protective equipment, first remove and dispose of gloves. Then, immediately clean your hands with soap and water or alcohol-based hand . Next, remove and dispose of facemask, and immediately clean your hands again with soap and water or alcohol-based hand .  • Avoid sharing household items with the patient. You should not share dishes, drinking glasses, cups, eating utensils, towels, bedding, or other items. After the patient uses these items, you should wash them thoroughly (see below “Wash laundry thoroughly”).  • Clean all “high-touch” surfaces, such as counters, tabletops, doorknobs, bathroom fixtures, toilets, phones, keyboards, tablets, and bedside tables, every day. Also, clean any surfaces that may have blood, stool, or body fluids on them.   o Use a household cleaning spray or wipe, according to the label instructions. Labels contain instructions for safe and effective use of the cleaning product including precautions you should take when applying the product, such as wearing gloves and making sure you have good ventilation during use of the product.  • Wash laundry thoroughly.   o Immediately remove and wash clothes or bedding that have blood, stool, or body fluids on them.  o Wear disposable gloves while handling soiled items and keep soiled items away from your body. Clean your hands (with soap and water or an  alcohol-based hand ) immediately after removing your gloves.  o Read and follow directions on labels of laundry or clothing items and detergent. In general, using a normal laundry detergent according to washing machine instructions and dry thoroughly using the warmest temperatures recommended on the clothing label.  • Place all used disposable gloves, facemasks, and other contaminated items in a lined container before disposing of them with other household waste. Clean your hands (with soap and water or an alcohol-based hand ) immediately after handling these items. Soap and water should be used preferentially if hands are visibly dirty.  • Discuss any additional questions with your state or local health department or healthcare provider.    Adapted from information provided by the Centers for Disease Control and Prevention.  For more information, visit https://www.cdc.gov/coronavirus/2019-ncov/hcp/guidance-prevent-spread.html

## 2022-02-11 PROBLEM — F41.9 ANXIETY: Status: ACTIVE | Noted: 2019-07-12

## 2022-02-11 PROBLEM — F32.A DEPRESSIVE DISORDER: Status: ACTIVE | Noted: 2019-07-12

## 2022-02-11 PROBLEM — K21.9 GERD (GASTROESOPHAGEAL REFLUX DISEASE): Status: ACTIVE | Noted: 2017-06-08

## 2022-02-11 PROBLEM — K25.9 GASTRIC ULCER: Status: ACTIVE | Noted: 2019-07-12

## 2022-02-11 PROBLEM — L30.9 ECZEMA: Status: ACTIVE | Noted: 2019-07-12

## 2022-02-11 PROBLEM — G43.909 MIGRAINE: Status: ACTIVE | Noted: 2019-07-12

## 2022-02-28 ENCOUNTER — OFFICE VISIT (OUTPATIENT)
Dept: GASTROENTEROLOGY | Facility: CLINIC | Age: 32
End: 2022-02-28

## 2022-02-28 VITALS
HEART RATE: 99 BPM | DIASTOLIC BLOOD PRESSURE: 84 MMHG | SYSTOLIC BLOOD PRESSURE: 141 MMHG | BODY MASS INDEX: 43.07 KG/M2 | HEIGHT: 66 IN | WEIGHT: 268 LBS

## 2022-02-28 DIAGNOSIS — R13.19 ESOPHAGEAL DYSPHAGIA: ICD-10-CM

## 2022-02-28 DIAGNOSIS — K21.9 GASTROESOPHAGEAL REFLUX DISEASE, UNSPECIFIED WHETHER ESOPHAGITIS PRESENT: Primary | ICD-10-CM

## 2022-02-28 DIAGNOSIS — Z87.19 HISTORY OF ESOPHAGEAL STRICTURE: ICD-10-CM

## 2022-02-28 PROCEDURE — 99213 OFFICE O/P EST LOW 20 MIN: CPT | Performed by: NURSE PRACTITIONER

## 2022-02-28 RX ORDER — DEXTROSE AND SODIUM CHLORIDE 5; .45 G/100ML; G/100ML
30 INJECTION, SOLUTION INTRAVENOUS CONTINUOUS PRN
Status: CANCELLED | OUTPATIENT
Start: 2022-03-21

## 2022-02-28 RX ORDER — PANTOPRAZOLE SODIUM 40 MG/1
40 TABLET, DELAYED RELEASE ORAL DAILY
COMMUNITY
End: 2022-06-24 | Stop reason: ALTCHOICE

## 2022-02-28 NOTE — PROGRESS NOTES
Chief Complaint   Patient presents with   • Difficulty Swallowing       Subjective    Sanjuana Allison is a 31 y.o. female. she is here today for follow-up.    History of Present Illness  31-year-old female presents to discuss recurrent dysphagia history of esophageal stricture.  She was last seen here April 2018 underwent EGD with dilation states symptoms  improved for a bit but have been worsening over the last 2 years.  She had relocated to [place] due to COVID had adequate follow-up as far as possible reports she is mostly eating liquids and very soft foods.  Her weight is up 22 pounds from office visit here in 2018.  Reports frequent reflux symptoms states she had run out of her Protonix and recently restarted it about a week or 2 ago reports constant nausea denies any vomiting.    Plan; schedule patient for EGD with dilation if indicated due to recurrent dysphagia worsening GERD and history of esophageal stricture.     The following portions of the patient's history were reviewed and updated as appropriate:   Past Medical History:   Diagnosis Date   • Acid reflux    • Allergic    • Anxiety    • Depression    • Gallbladder abscess    • Migraines    • Sinusitis    • Stomach ulcer      Past Surgical History:   Procedure Laterality Date   • CHOLECYSTECTOMY     • ENDOSCOPY N/A 5/23/2018    Procedure: ESOPHAGOGASTRODUODENOSCOPY possible dilation;  Surgeon: Rafael Garcia MD;  Location: [facility] ENDOSCOPY;  Service: Gastroenterology   • FINGER NAIL SURGERY     • GALLBLADDER SURGERY     • TONSILLECTOMY AND ADENOIDECTOMY     • WISDOM TOOTH EXTRACTION       Family History   Problem Relation Age of Onset   • Arthritis Mother    • Diabetes Mother    • Cancer Mother         breat cancer   • Hypertension Mother    • Hypertension Father    • Hypertension Maternal Grandmother    • Heart disease Maternal Grandfather    • Hypertension Maternal Grandfather      OB History    No obstetric history on file.       Prior to  Admission medications    Medication Sig Start Date End Date Taking? Authorizing Provider   albuterol sulfate  (90 Base) MCG/ACT inhaler Inhale 2 puffs As Needed for Wheezing.   Yes El Medrano MD   benzonatate (Tessalon Perles) 100 MG capsule Take 1 to 2 perles tid prn cough 1/11/22  Yes Tressa Jones APRN   cyclobenzaprine (FLEXERIL) 10 MG tablet Take 1 tablet by mouth 3 (Three) Times a Day As Needed for Muscle Spasms. 1/31/22  Yes Mercedes Butler APRN   docusate sodium (COLACE) 100 MG capsule Take 1 capsule by mouth 2 (Two) Times a Day As Needed for Constipation. 12/15/21  Yes Ankit Espinoza MD   ferrous sulfate 325 (65 FE) MG tablet Take 1 tablet by mouth Daily With Breakfast. 12/15/21  Yes Ankit Espinoza MD   FLUoxetine (PROzac) 40 MG capsule Take 40 mg by mouth Daily. 8/2/21  Yes El Medrano MD   fluticasone (FLONASE) 50 MCG/ACT nasal spray 2 sprays into the nostril(s) as directed by provider Daily. 9/17/21  Yes Shalini Chacon APRN   hydrOXYzine pamoate (VISTARIL) 25 MG capsule As Needed. 3/13/21  Yes El Medrano MD   pantoprazole (PROTONIX) 40 MG EC tablet Take 40 mg by mouth Daily.   Yes El Medrano MD   Prenatal Vit-Fe Fumarate-FA (PRENATAL PO) Take  by mouth Daily.   Yes El Medrano MD   promethazine (PHENERGAN) 25 MG tablet Take 1 tablet by mouth Every 6 (Six) Hours As Needed for Nausea or Vomiting. 10/23/21  Yes Jermaine Briseno APRN   vitamin B-12 (CYANOCOBALAMIN) 1000 MCG tablet Take 1 tablet by mouth Daily. 12/15/21  Yes Ankit Espinoza MD     Allergies   Allergen Reactions   • Amiodarone    • Flagyl [Metronidazole] Hives   • Latex    • Nabumetone Nausea Only   • Penicillins Hives   • Sulfa Antibiotics Hives     Social History     Socioeconomic History   • Marital status:    Tobacco Use   • Smoking status: Never Smoker   • Smokeless tobacco: Never Used   Substance and Sexual Activity   • Alcohol use: No   • Drug use: No   •  "Sexual activity: Defer     Birth control/protection: Injection       Review of Systems  Review of Systems   Constitutional: Positive for fatigue. Negative for activity change, appetite change, chills, diaphoresis, fever and unexpected weight change.   HENT: Negative for sore throat and trouble swallowing.    Respiratory: Negative for shortness of breath.    Gastrointestinal: Positive for abdominal pain and nausea. Negative for abdominal distention, anal bleeding, blood in stool, constipation, diarrhea, rectal pain and vomiting.   Musculoskeletal: Negative for arthralgias.   Skin: Negative for pallor.   Neurological: Negative for light-headedness.        /84 (BP Location: Left arm)   Pulse 99   Ht 167.6 cm (66\")   Wt 122 kg (268 lb)   BMI 43.26 kg/m²     Objective    Physical Exam  Constitutional:       General: She is not in acute distress.     Appearance: Normal appearance. She is normal weight. She is not ill-appearing.   HENT:      Head: Normocephalic and atraumatic.   Pulmonary:      Effort: Pulmonary effort is normal.   Abdominal:      General: Abdomen is flat. Bowel sounds are normal. There is no distension.      Palpations: Abdomen is soft. There is no mass.      Tenderness: There is no abdominal tenderness.   Neurological:      Mental Status: She is alert.       Consult on 12/14/2021   Component Date Value Ref Range Status   • LDH 12/14/2021 255* 135 - 214 U/L Final   • Iron 12/14/2021 40  37 - 145 mcg/dL Final   • Iron Saturation 12/14/2021 8* 20 - 50 % Final   • Transferrin 12/14/2021 346  200 - 360 mg/dL Final   • TIBC 12/14/2021 516  298 - 536 mcg/dL Final   • Ferritin 12/14/2021 61.88  13.00 - 150.00 ng/mL Final   • Folate 12/14/2021 >20.00  4.78 - 24.20 ng/mL Final   • Vitamin B-12 12/14/2021 513  211 - 946 pg/mL Final   • C-Reactive Protein 12/14/2021 1.33* 0.00 - 0.50 mg/dL Final   • WBC 12/14/2021 16.78* 3.40 - 10.80 10*3/mm3 Final   • RBC 12/14/2021 6.12* 3.77 - 5.28 10*6/mm3 Final   • " Hemoglobin 12/14/2021 14.4  12.0 - 15.9 g/dL Final   • Hematocrit 12/14/2021 46.4  34.0 - 46.6 % Final   • MCV 12/14/2021 75.8* 79.0 - 97.0 fL Final   • MCH 12/14/2021 23.5* 26.6 - 33.0 pg Final   • MCHC 12/14/2021 31.0* 31.5 - 35.7 g/dL Final   • RDW 12/14/2021 15.9* 12.3 - 15.4 % Final   • RDW-SD 12/14/2021 41.2  37.0 - 54.0 fl Final   • MPV 12/14/2021 8.7  6.0 - 12.0 fL Final   • Platelets 12/14/2021 493* 140 - 450 10*3/mm3 Final   • Neutrophil % 12/14/2021 65.2  42.7 - 76.0 % Final   • Lymphocyte % 12/14/2021 25.9  19.6 - 45.3 % Final   • Monocyte % 12/14/2021 4.5* 5.0 - 12.0 % Final   • Eosinophil % 12/14/2021 3.2  0.3 - 6.2 % Final   • Basophil % 12/14/2021 0.5  0.0 - 1.5 % Final   • Immature Grans % 12/14/2021 0.7* 0.0 - 0.5 % Final   • Neutrophils, Absolute 12/14/2021 10.95* 1.70 - 7.00 10*3/mm3 Final   • Lymphocytes, Absolute 12/14/2021 4.34* 0.70 - 3.10 10*3/mm3 Final   • Monocytes, Absolute 12/14/2021 0.75  0.10 - 0.90 10*3/mm3 Final   • Eosinophils, Absolute 12/14/2021 0.54* 0.00 - 0.40 10*3/mm3 Final   • Basophils, Absolute 12/14/2021 0.09  0.00 - 0.20 10*3/mm3 Final   • Immature Grans, Absolute 12/14/2021 0.11* 0.00 - 0.05 10*3/mm3 Final   • nRBC 12/14/2021 0.0  0.0 - 0.2 /100 WBC Final   • Extra Tube 12/14/2021 Hold for add-ons.   Final    Auto resulted.     Assessment/Plan      1. Gastroesophageal reflux disease, unspecified whether esophagitis present    2. Esophageal dysphagia    3. History of esophageal stricture    .       Orders placed during this encounter include:  Orders Placed This Encounter   Procedures   • COVID PRE-OP / PRE-PROCEDURE SCREENING ORDER (NO ISOLATION) - Swab, Nasopharynx     Standing Status:   Future     Standing Expiration Date:   2/28/2023     Order Specific Question:   Release to patient     Answer:   Immediate     Order Specific Question:   Please select your location:     Answer:   [facility]     Order Specific Question:   COVID Screening Order:     Answer:    In-House: PRE-OP: BD MAX, 8-10 HR TAT [[id number]]     Order Specific Question:   Previously tested for COVID-19?     Answer:   Yes     Order Specific Question:   Employed in healthcare setting?     Answer:   No     Order Specific Question:   Symptomatic for COVID-19 as defined by CDC?     Answer:   No     Order Specific Question:   Hospitalized for COVID-19?     Answer:   No     Order Specific Question:   Admitted to ICU for COVID-19?     Answer:   No     Order Specific Question:   Resident in a congregate (group) care setting?     Answer:   No     Order Specific Question:   Pregnant?     Answer:   No   • Follow Anesthesia Guidelines / Standing Orders     Standing Status:   Future   • Obtain Informed Consent     Standing Status:   Future     Order Specific Question:   Informed Consent Given For     Answer:   ESOPHAGOGASTRODUODENOSCOPY possible dilation       ESOPHAGOGASTRODUODENOSCOPY possible dilation (N/A)    Review and/or summary of lab tests, radiology, procedures, medications. Review and summary of old records and obtaining of history. The risks and benefits of my recommendations, as well as other treatment options were discussed with the patient today. Questions were answered.    No orders of the defined types were placed in this encounter.      Follow-up: Return in about 4 weeks (around 3/28/2022) for Recheck, After test.          This document has been electronically signed by MARIA GUADALUPE Heredia on February 28, 2022 15:26 CST           I spent 18 minutes caring for Sanjuana on this date of service. This time includes time spent by me in the following activities:preparing for the visit, reviewing tests, obtaining and/or reviewing a separately obtained history, performing a medically appropriate examination and/or evaluation , counseling and educating the patient/family/caregiver, ordering medications, tests, or procedures, referring and communicating with other health care professionals , documenting  information in the medical record and care coordination    Results for orders placed or performed in visit on 12/14/21   Green Top (Gel)   Result Value Ref Range    Extra Tube Hold for add-ons.    Flow Cytometry, Blood    Specimen: Blood   Result Value Ref Range    Reference Lab Report     CBC Auto Differential    Specimen: Blood   Result Value Ref Range    WBC 16.78 (H) 3.40 - 10.80 10*3/mm3    RBC 6.12 (H) 3.77 - 5.28 10*6/mm3    Hemoglobin 14.4 12.0 - 15.9 g/dL    Hematocrit 46.4 34.0 - 46.6 %    MCV 75.8 (L) 79.0 - 97.0 fL    MCH 23.5 (L) 26.6 - 33.0 pg    MCHC 31.0 (L) 31.5 - 35.7 g/dL    RDW 15.9 (H) 12.3 - 15.4 %    RDW-SD 41.2 37.0 - 54.0 fl    MPV 8.7 6.0 - 12.0 fL    Platelets 493 (H) 140 - 450 10*3/mm3    Neutrophil % 65.2 42.7 - 76.0 %    Lymphocyte % 25.9 19.6 - 45.3 %    Monocyte % 4.5 (L) 5.0 - 12.0 %    Eosinophil % 3.2 0.3 - 6.2 %    Basophil % 0.5 0.0 - 1.5 %    Immature Grans % 0.7 (H) 0.0 - 0.5 %    Neutrophils, Absolute 10.95 (H) 1.70 - 7.00 10*3/mm3    Lymphocytes, Absolute 4.34 (H) 0.70 - 3.10 10*3/mm3    Monocytes, Absolute 0.75 0.10 - 0.90 10*3/mm3    Eosinophils, Absolute 0.54 (H) 0.00 - 0.40 10*3/mm3    Basophils, Absolute 0.09 0.00 - 0.20 10*3/mm3    Immature Grans, Absolute 0.11 (H) 0.00 - 0.05 10*3/mm3    nRBC 0.0 0.0 - 0.2 /100 WBC   Iron and TIBC    Specimen: Blood   Result Value Ref Range    Iron 40 37 - 145 mcg/dL    Iron Saturation 8 (L) 20 - 50 %    Transferrin 346 200 - 360 mg/dL    TIBC 516 298 - 536 mcg/dL   C-reactive Protein    Specimen: Blood   Result Value Ref Range    C-Reactive Protein 1.33 (H) 0.00 - 0.50 mg/dL   Lactate Dehydrogenase    Specimen: Blood   Result Value Ref Range     (H) 135 - 214 U/L   Folate    Specimen: Blood   Result Value Ref Range    Folate >20.00 4.78 - 24.20 ng/mL   Ferritin    Specimen: Blood   Result Value Ref Range    Ferritin 61.88 13.00 - 150.00 ng/mL   Vitamin B12    Specimen: Blood   Result Value Ref Range    Vitamin B-12 513 211 -  946 pg/mL   Results for orders placed or performed during the hospital encounter of 10/23/21   Green Top (Gel)   Result Value Ref Range    Extra Tube Hold for add-ons.    Urinalysis, Microscopic Only - Urine, Clean Catch    Specimen: Urine, Clean Catch   Result Value Ref Range    RBC, UA 3-5 (A) None Seen /HPF    WBC, UA 3-5 None Seen, 0-2, 3-5 /HPF    Bacteria, UA 2+ (A) None Seen /HPF    Squamous Epithelial Cells, UA 3-5 (A) None Seen, 0-2 /HPF    Hyaline Casts, UA 0-2 None Seen /LPF    Methodology Automated Microscopy    Urinalysis With Microscopic If Indicated (No Culture) - Urine, Clean Catch    Specimen: Urine, Clean Catch   Result Value Ref Range    Color, UA Yellow Yellow, Straw, Dark Yellow, Miri    Appearance, UA Cloudy (A) Clear    pH, UA 7.5 5.0 - 9.0    Specific Gravity, UA 1.021 1.003 - 1.030    Glucose, UA Negative Negative    Ketones, UA Negative Negative    Bilirubin, UA Negative Negative    Blood, UA Large (3+) (A) Negative    Protein, UA Negative Negative    Leuk Esterase, UA Moderate (2+) (A) Negative    Nitrite, UA Negative Negative    Urobilinogen, UA 0.2 E.U./dL 0.2 - 1.0 E.U./dL   CBC Auto Differential    Specimen: Blood   Result Value Ref Range    WBC 19.01 (H) 3.40 - 10.80 10*3/mm3    RBC 6.04 (H) 3.77 - 5.28 10*6/mm3    Hemoglobin 14.4 12.0 - 15.9 g/dL    Hematocrit 45.4 34.0 - 46.6 %    MCV 75.2 (L) 79.0 - 97.0 fL    MCH 23.8 (L) 26.6 - 33.0 pg    MCHC 31.7 31.5 - 35.7 g/dL    RDW 15.9 (H) 12.3 - 15.4 %    RDW-SD 41.1 37.0 - 54.0 fl    MPV 8.6 6.0 - 12.0 fL    Platelets 474 (H) 140 - 450 10*3/mm3    Neutrophil % 83.2 (H) 42.7 - 76.0 %    Lymphocyte % 11.3 (L) 19.6 - 45.3 %    Monocyte % 3.4 (L) 5.0 - 12.0 %    Eosinophil % 1.2 0.3 - 6.2 %    Basophil % 0.3 0.0 - 1.5 %    Immature Grans % 0.6 (H) 0.0 - 0.5 %    Neutrophils, Absolute 15.81 (H) 1.70 - 7.00 10*3/mm3    Lymphocytes, Absolute 2.14 0.70 - 3.10 10*3/mm3    Monocytes, Absolute 0.65 0.10 - 0.90 10*3/mm3    Eosinophils, Absolute  0.23 0.00 - 0.40 10*3/mm3    Basophils, Absolute 0.06 0.00 - 0.20 10*3/mm3    Immature Grans, Absolute 0.12 (H) 0.00 - 0.05 10*3/mm3    nRBC 0.0 0.0 - 0.2 /100 WBC   Lavender Top   Result Value Ref Range    Extra Tube hold for add-on    hCG, Serum, Qualitative    Specimen: Blood   Result Value Ref Range    HCG Qualitative Negative Negative   Lipase    Specimen: Blood   Result Value Ref Range    Lipase 23 13 - 60 U/L   Lactic Acid, Plasma    Specimen: Blood   Result Value Ref Range    Lactate 1.4 0.5 - 2.0 mmol/L   Amylase    Specimen: Blood   Result Value Ref Range    Amylase 33 28 - 100 U/L   Comprehensive Metabolic Panel    Specimen: Blood   Result Value Ref Range    Glucose 122 (H) 65 - 99 mg/dL    BUN 8 6 - 20 mg/dL    Creatinine 0.63 0.57 - 1.00 mg/dL    Sodium 134 (L) 136 - 145 mmol/L    Potassium 3.9 3.5 - 5.2 mmol/L    Chloride 99 98 - 107 mmol/L    CO2 22.0 22.0 - 29.0 mmol/L    Calcium 9.0 8.6 - 10.5 mg/dL    Total Protein 8.0 6.0 - 8.5 g/dL    Albumin 4.30 3.50 - 5.20 g/dL    ALT (SGPT) 25 1 - 33 U/L    AST (SGOT) 30 1 - 32 U/L    Alkaline Phosphatase 121 (H) 39 - 117 U/L    Total Bilirubin 0.4 0.0 - 1.2 mg/dL    eGFR Non African Amer 110 >60 mL/min/1.73    Globulin 3.7 gm/dL    A/G Ratio 1.2 g/dL    BUN/Creatinine Ratio 12.7 7.0 - 25.0    Anion Gap 13.0 5.0 - 15.0 mmol/L     *Note: Due to a large number of results and/or encounters for the requested time period, some results have not been displayed. A complete set of results can be found in Results Review.

## 2022-02-28 NOTE — PATIENT INSTRUCTIONS
"Syed's Neurology in Clinical Practice (8th ed., pp. 152-163). Sabina PA: Elsevier.\"> Sarah Textbook of Surgery (21st ed., pp. 3228-1439). LIUDMILA Muhammad: [employer].\">   Dysphagia    Dysphagia is trouble swallowing. This condition occurs when solids and liquids stick in a person's throat on the way down to the stomach, or when food takes longer to get to the stomach than usual.  You may have problems swallowing food, liquids, or both. You may also have pain while trying to swallow. It may take you more time and effort to swallow something.  What are the causes?  This condition may be caused by:  · Muscle problems. They may make it difficult for you to move food and liquids through the esophagus, which is the tube that connects your mouth to your stomach.  · Blockages. You may have ulcers, scar tissue, or inflammation that blocks the normal passage of food and liquids. Causes of these problems include:  ? Acid reflux from your stomach into your esophagus (gastroesophageal reflux).  ? Infections.  ? Radiation treatment for cancer.  ? Medicines taken without enough fluids to wash them down into your stomach.  · Stroke. This can affect the nerves and make it difficult to swallow.  · Nerve problems. These prevent signals from being sent to the muscles of your esophagus to squeeze (contract) and move what you swallow down to your stomach.  · Globus pharyngeus. This is a common problem that involves a feeling like something is stuck in your throat or a sense of trouble with swallowing, even though nothing is wrong with the swallowing passages.  · Certain conditions, such as cerebral palsy or Parkinson's disease.  What are the signs or symptoms?  Common symptoms of this condition include:  · A feeling that solids or liquids are stuck in your throat on the way down to the stomach.  · Pain while swallowing.  · Coughing or gagging while trying to swallow.  Other symptoms include:  · Food moving back from your " stomach to your mouth (regurgitation).  · Noises coming from your throat.  · Chest discomfort with swallowing.  · A feeling of fullness when swallowing.  · Drooling, especially when the throat is blocked.  · Heartburn.  How is this diagnosed?  This condition may be diagnosed by:  · Barium X-ray. In this test, you will swallow a white liquid that sticks to the inside of your esophagus. X-ray images are then taken.  · Endoscopy. In this test, a flexible telescope is inserted down your throat to look at your esophagus and your stomach.  · CT scans and an MRI.  How is this treated?  Treatment for dysphagia depends on the cause of this condition, such as:  · If the dysphagia is caused by acid reflux or infection, medicines may be used. They may include antibiotics and heartburn medicines.  · If the dysphagia is caused by problems with the muscles, swallowing therapy may be used to help you strengthen your swallowing muscles. You may have to do specific exercises to strengthen the muscles or stretch them.  · If the dysphagia is caused by a blockage or mass, procedures to remove the blockage may be done. You may need surgery and a feeding tube.  You may need to make diet changes. Ask your health care provider for specific instructions.  Follow these instructions at home:  Medicines  · Take over-the-counter and prescription medicines only as told by your health care provider.  · If you were prescribed an antibiotic medicine, take it as told by your health care provider. Do not stop taking the antibiotic even if you start to feel better.  Eating and drinking    · Follow any diet changes as told by your health care provider.  · Work with a diet and nutrition specialist (dietitian) to create an eating plan that will help you get the nutrients you need in order to stay healthy.  · Eat soft foods that are easier to swallow.  · Cut your food into small pieces and eat slowly. Take small bites.  · Eat and drink only when you are  sitting upright.  · Do not drink alcohol or caffeine. If you need help quitting, ask your health care provider.    General instructions  · Check your weight every day to make sure you are not losing weight.  · Do not use any products that contain nicotine or tobacco, such as cigarettes, e-cigarettes, and chewing tobacco. If you need help quitting, ask your health care provider.  · Keep all follow-up visits as told by your health care provider. This is important.  Contact a health care provider if you:  · Lose weight because you cannot swallow.  · Cough when you drink liquids.  · Cough up partially digested food.  Get help right away if you:  · Cannot swallow your saliva.  · Have shortness of breath, a fever, or both.  · Have a hoarse voice and also have trouble swallowing.  Summary  · Dysphagia is trouble swallowing. This condition occurs when solids and liquids stick in a person's throat on the way down to the stomach. You may cough or gag while trying to swallow.  · Dysphagia has many possible causes.  · Treatment for dysphagia depends on the cause of the condition.  · Keep all follow-up visits as told by your health care provider. This is important.  This information is not intended to replace advice given to you by your health care provider. Make sure you discuss any questions you have with your health care provider.  Document Revised: 05/13/2020 Document Reviewed: 05/13/2020  [employer] Patient Education © 2021 [employer].

## 2022-03-18 ENCOUNTER — LAB (OUTPATIENT)
Dept: LAB | Facility: HOSPITAL | Age: 32
End: 2022-03-18

## 2022-03-18 DIAGNOSIS — R13.19 ESOPHAGEAL DYSPHAGIA: ICD-10-CM

## 2022-03-18 DIAGNOSIS — Z87.19 HISTORY OF ESOPHAGEAL STRICTURE: ICD-10-CM

## 2022-03-18 DIAGNOSIS — K21.9 GASTROESOPHAGEAL REFLUX DISEASE, UNSPECIFIED WHETHER ESOPHAGITIS PRESENT: ICD-10-CM

## 2022-03-18 LAB — SARS-COV-2 N GENE RESP QL NAA+PROBE: NOT DETECTED

## 2022-03-18 PROCEDURE — C9803 HOPD COVID-19 SPEC COLLECT: HCPCS

## 2022-03-18 PROCEDURE — 87635 SARS-COV-2 COVID-19 AMP PRB: CPT

## 2022-03-21 ENCOUNTER — HOSPITAL ENCOUNTER (OUTPATIENT)
Facility: HOSPITAL | Age: 32
Setting detail: HOSPITAL OUTPATIENT SURGERY
Discharge: HOME | End: 2022-03-21
Attending: INTERNAL MEDICINE | Admitting: INTERNAL MEDICINE

## 2022-03-21 ENCOUNTER — ANESTHESIA (OUTPATIENT)
Dept: GASTROENTEROLOGY | Facility: HOSPITAL | Age: 32
End: 2022-03-21

## 2022-03-21 ENCOUNTER — ANESTHESIA EVENT (OUTPATIENT)
Dept: GASTROENTEROLOGY | Facility: HOSPITAL | Age: 32
End: 2022-03-21

## 2022-03-21 ENCOUNTER — LAB (OUTPATIENT)
Dept: LAB | Facility: HOSPITAL | Age: 32
End: 2022-03-21

## 2022-03-21 VITALS
SYSTOLIC BLOOD PRESSURE: 127 MMHG | TEMPERATURE: 97.6 F | RESPIRATION RATE: 16 BRPM | OXYGEN SATURATION: 97 % | WEIGHT: 264 LBS | BODY MASS INDEX: 42.43 KG/M2 | HEIGHT: 66 IN | DIASTOLIC BLOOD PRESSURE: 75 MMHG | HEART RATE: 88 BPM

## 2022-03-21 DIAGNOSIS — K21.9 GASTROESOPHAGEAL REFLUX DISEASE, UNSPECIFIED WHETHER ESOPHAGITIS PRESENT: ICD-10-CM

## 2022-03-21 DIAGNOSIS — D75.839 THROMBOCYTOSIS: ICD-10-CM

## 2022-03-21 DIAGNOSIS — D72.828 OTHER ELEVATED WHITE BLOOD CELL (WBC) COUNT: ICD-10-CM

## 2022-03-21 DIAGNOSIS — R13.19 ESOPHAGEAL DYSPHAGIA: ICD-10-CM

## 2022-03-21 DIAGNOSIS — R71.8 MICROCYTOSIS: ICD-10-CM

## 2022-03-21 DIAGNOSIS — Z87.19 HISTORY OF ESOPHAGEAL STRICTURE: ICD-10-CM

## 2022-03-21 LAB
BASOPHILS # BLD AUTO: 0.04 10*3/MM3 (ref 0–0.2)
BASOPHILS NFR BLD AUTO: 0.4 % (ref 0–1.5)
DEPRECATED RDW RBC AUTO: 42.8 FL (ref 37–54)
EOSINOPHIL # BLD AUTO: 0.35 10*3/MM3 (ref 0–0.4)
EOSINOPHIL NFR BLD AUTO: 3.1 % (ref 0.3–6.2)
ERYTHROCYTE [DISTWIDTH] IN BLOOD BY AUTOMATED COUNT: 16.1 % (ref 12.3–15.4)
FERRITIN SERPL-MCNC: 51.68 NG/ML (ref 13–150)
FOLATE SERPL-MCNC: 15.2 NG/ML (ref 4.78–24.2)
HCT VFR BLD AUTO: 39.4 % (ref 34–46.6)
HGB BLD-MCNC: 12.3 G/DL (ref 12–15.9)
IMM GRANULOCYTES # BLD AUTO: 0.06 10*3/MM3 (ref 0–0.05)
IMM GRANULOCYTES NFR BLD AUTO: 0.5 % (ref 0–0.5)
IRON 24H UR-MRATE: 36 MCG/DL (ref 37–145)
IRON SATN MFR SERPL: 9 % (ref 20–50)
LYMPHOCYTES # BLD AUTO: 3.02 10*3/MM3 (ref 0.7–3.1)
LYMPHOCYTES NFR BLD AUTO: 26.8 % (ref 19.6–45.3)
MCH RBC QN AUTO: 23.3 PG (ref 26.6–33)
MCHC RBC AUTO-ENTMCNC: 31.2 G/DL (ref 31.5–35.7)
MCV RBC AUTO: 74.6 FL (ref 79–97)
MONOCYTES # BLD AUTO: 0.55 10*3/MM3 (ref 0.1–0.9)
MONOCYTES NFR BLD AUTO: 4.9 % (ref 5–12)
NEUTROPHILS NFR BLD AUTO: 64.3 % (ref 42.7–76)
NEUTROPHILS NFR BLD AUTO: 7.25 10*3/MM3 (ref 1.7–7)
NRBC BLD AUTO-RTO: 0 /100 WBC (ref 0–0.2)
PLATELET # BLD AUTO: 432 10*3/MM3 (ref 140–450)
PMV BLD AUTO: 8.8 FL (ref 6–12)
RBC # BLD AUTO: 5.28 10*6/MM3 (ref 3.77–5.28)
TIBC SERPL-MCNC: 417 MCG/DL (ref 298–536)
TRANSFERRIN SERPL-MCNC: 280 MG/DL (ref 200–360)
VIT B12 BLD-MCNC: 610 PG/ML (ref 211–946)
WBC NRBC COR # BLD: 11.27 10*3/MM3 (ref 3.4–10.8)

## 2022-03-21 PROCEDURE — 82607 VITAMIN B-12: CPT | Performed by: INTERNAL MEDICINE

## 2022-03-21 PROCEDURE — 83540 ASSAY OF IRON: CPT | Performed by: INTERNAL MEDICINE

## 2022-03-21 PROCEDURE — 85025 COMPLETE CBC W/AUTO DIFF WBC: CPT | Performed by: INTERNAL MEDICINE

## 2022-03-21 PROCEDURE — 25010000002 PROPOFOL 10 MG/ML EMULSION: Performed by: NURSE ANESTHETIST, CERTIFIED REGISTERED

## 2022-03-21 PROCEDURE — 82746 ASSAY OF FOLIC ACID SERUM: CPT | Performed by: INTERNAL MEDICINE

## 2022-03-21 PROCEDURE — 43248 EGD GUIDE WIRE INSERTION: CPT | Performed by: INTERNAL MEDICINE

## 2022-03-21 PROCEDURE — 82728 ASSAY OF FERRITIN: CPT | Performed by: INTERNAL MEDICINE

## 2022-03-21 PROCEDURE — 84466 ASSAY OF TRANSFERRIN: CPT | Performed by: INTERNAL MEDICINE

## 2022-03-21 PROCEDURE — 43239 EGD BIOPSY SINGLE/MULTIPLE: CPT | Performed by: INTERNAL MEDICINE

## 2022-03-21 PROCEDURE — C1769 GUIDE WIRE: HCPCS | Performed by: INTERNAL MEDICINE

## 2022-03-21 RX ORDER — DEXTROSE AND SODIUM CHLORIDE 5; .45 G/100ML; G/100ML
30 INJECTION, SOLUTION INTRAVENOUS CONTINUOUS PRN
Status: DISCONTINUED | OUTPATIENT
Start: 2022-03-21 | End: 2022-03-21 | Stop reason: HOSPADM

## 2022-03-21 RX ORDER — LIDOCAINE HYDROCHLORIDE 20 MG/ML
INJECTION, SOLUTION EPIDURAL; INFILTRATION; INTRACAUDAL; PERINEURAL AS NEEDED
Status: DISCONTINUED | OUTPATIENT
Start: 2022-03-21 | End: 2022-03-21 | Stop reason: SURG

## 2022-03-21 RX ORDER — PROPOFOL 10 MG/ML
VIAL (ML) INTRAVENOUS AS NEEDED
Status: DISCONTINUED | OUTPATIENT
Start: 2022-03-21 | End: 2022-03-21 | Stop reason: SURG

## 2022-03-21 RX ADMIN — DEXTROSE AND SODIUM CHLORIDE 30 ML/HR: 5; 450 INJECTION, SOLUTION INTRAVENOUS at 14:29

## 2022-03-21 RX ADMIN — PROPOFOL 50 MG: 10 INJECTION, EMULSION INTRAVENOUS at 15:02

## 2022-03-21 RX ADMIN — LIDOCAINE HYDROCHLORIDE 60 MG: 20 INJECTION, SOLUTION EPIDURAL; INFILTRATION; INTRACAUDAL; PERINEURAL at 15:01

## 2022-03-21 RX ADMIN — PROPOFOL 50 MG: 10 INJECTION, EMULSION INTRAVENOUS at 15:04

## 2022-03-21 RX ADMIN — PROPOFOL 100 MG: 10 INJECTION, EMULSION INTRAVENOUS at 15:01

## 2022-03-21 RX ADMIN — PROPOFOL 50 MG: 10 INJECTION, EMULSION INTRAVENOUS at 15:03

## 2022-03-21 NOTE — ANESTHESIA PREPROCEDURE EVALUATION
Anesthesia Evaluation     Patient summary reviewed and Nursing notes reviewed   NPO Solid Status: > 8 hours  NPO Liquid Status: > 2 hours           Airway   Mallampati: II  TM distance: >3 FB  Neck ROM: full  no difficulty expected  Dental - normal exam     Pulmonary - normal exam   Cardiovascular - normal exam        Neuro/Psych  (+) headaches, psychiatric history Depression and Anxiety,    GI/Hepatic/Renal/Endo    (+) obesity,  GERD, PUD,      Musculoskeletal     Abdominal    Substance History      OB/GYN          Other                          Anesthesia Plan    ASA 2     MAC     intravenous induction     Anesthetic plan, all risks, benefits, and alternatives have been provided, discussed and informed consent has been obtained with: patient.

## 2022-03-21 NOTE — ANESTHESIA POSTPROCEDURE EVALUATION
Patient: Sanjuana Allison    Procedure Summary     Date: 03/21/22 Room / Location: [facility] ENDOSCOPY 1 / [facility] ENDOSCOPY    Anesthesia Start: 1457 Anesthesia Stop: 1507    Procedure: ESOPHAGOGASTRODUODENOSCOPY possible dilation (N/A ) Diagnosis:       Gastroesophageal reflux disease, unspecified whether esophagitis present      Esophageal dysphagia      History of esophageal stricture      (Gastroesophageal reflux disease, unspecified whether esophagitis present [K21.9])      (Esophageal dysphagia [R13.19])      (History of esophageal stricture [Z87.19])    Surgeons: Rafael Garcia MD Provider: Cassie Torres CRNA    Anesthesia Type: MAC ASA Status: 2          Anesthesia Type: MAC    Vitals  No vitals data found for the desired time range.          Post Anesthesia Care and Evaluation    Patient location during evaluation: bedside  Patient participation: complete - patient participated  Level of consciousness: awake and awake and alert  Pain score: 0  Pain management: adequate  Airway patency: patent  Anesthetic complications: No anesthetic complications  PONV Status: none  Cardiovascular status: acceptable and stable  Respiratory status: acceptable, room air and spontaneous ventilation  Hydration status: acceptable    Comments: BP:  160/94  HR:  92  SAT:  99  RR:  20  TEMP: 97.0

## 2022-03-23 ENCOUNTER — OFFICE VISIT (OUTPATIENT)
Dept: ONCOLOGY | Facility: CLINIC | Age: 32
End: 2022-03-23

## 2022-03-23 VITALS
OXYGEN SATURATION: 98 % | TEMPERATURE: 96.8 F | WEIGHT: 265 LBS | RESPIRATION RATE: 18 BRPM | DIASTOLIC BLOOD PRESSURE: 87 MMHG | HEART RATE: 81 BPM | BODY MASS INDEX: 43.43 KG/M2 | SYSTOLIC BLOOD PRESSURE: 145 MMHG

## 2022-03-23 DIAGNOSIS — R71.8 MICROCYTOSIS: Primary | ICD-10-CM

## 2022-03-23 PROCEDURE — G0463 HOSPITAL OUTPT CLINIC VISIT: HCPCS | Performed by: NURSE PRACTITIONER

## 2022-03-23 PROCEDURE — 99214 OFFICE O/P EST MOD 30 MIN: CPT | Performed by: NURSE PRACTITIONER

## 2022-03-23 RX ORDER — FERROUS SULFATE 325(65) MG
325 TABLET ORAL 2 TIMES DAILY
Qty: 60 TABLET | Refills: 3 | Status: SHIPPED | OUTPATIENT
Start: 2022-03-23 | End: 2022-10-11 | Stop reason: SDUPTHER

## 2022-03-25 LAB
LAB AP CASE REPORT: NORMAL
PATH REPORT.FINAL DX SPEC: NORMAL

## 2022-03-28 ENCOUNTER — OFFICE VISIT (OUTPATIENT)
Dept: GASTROENTEROLOGY | Facility: CLINIC | Age: 32
End: 2022-03-28

## 2022-03-28 VITALS
HEIGHT: 66 IN | SYSTOLIC BLOOD PRESSURE: 140 MMHG | BODY MASS INDEX: 42.72 KG/M2 | DIASTOLIC BLOOD PRESSURE: 82 MMHG | WEIGHT: 265.8 LBS | HEART RATE: 84 BPM

## 2022-03-28 DIAGNOSIS — K21.00 GASTROESOPHAGEAL REFLUX DISEASE WITH ESOPHAGITIS WITHOUT HEMORRHAGE: ICD-10-CM

## 2022-03-28 DIAGNOSIS — K22.2 STRICTURE AND STENOSIS OF ESOPHAGUS: Primary | ICD-10-CM

## 2022-03-28 PROCEDURE — 99213 OFFICE O/P EST LOW 20 MIN: CPT | Performed by: NURSE PRACTITIONER

## 2022-04-04 NOTE — PROGRESS NOTES
DATE OF VISIT: 3/23/2022      REASON FOR VISIT:  Follow-up for iron deficiency anemia; leucocytosis, thrombocytosis    HISTORY OF PRESENT ILLNESS:   31-year-old female with medical problem consisting of anxiety/depression, migraine, seasonal allergies, irregular and heavy menstrual bleeding since menarche was initially seen in consultation on 12/14/2021 for abnormal CBC showing persistent leukocytosis, microcytosis and new onset of thrombocytosis. Patient was found to have iron deficiency for which she has been started on ferrous sulfate 1 tablet p.o. daily. Patient had a work-up done for evaluation of leukocytosis and other abnormality; CRP was elevated suggesting inflammation, Flow cytometry was negative  Patient is taking iron tablet daily along with B-12 daily.    Past Medical History, Past Surgical History, Social History, Family History have been reviewed and are without significant changes except as mentioned.    Review of Systems   A comprehensive 14 point review of systems was performed and was negative except as mentioned.  +heavy menses  Medications:  The current medication list was reviewed in the EMR    ALLERGIES:    Allergies   Allergen Reactions   • Flagyl [Metronidazole] Hives   • Nabumetone Nausea Only   • Penicillins Hives   • Sulfa Antibiotics Hives   • Amiodarone Rash   • Latex Rash       Objective      Vitals:    03/23/22 0833   BP: 145/87   Pulse: 81   Resp: 18   Temp: 96.8 °F (36 °C)   SpO2: 98%   Weight: 120 kg (265 lb)   PainSc: 0-No pain     No flowsheet data found.    Physical Exam  General: alert and oriented no distress  Lungs: CTAB   Card: RRR no murmur  Ext; no edema      RECENT LABS:  Glucose   Date Value Ref Range Status   10/23/2021 122 (H) 65 - 99 mg/dL Final     Sodium   Date Value Ref Range Status   10/23/2021 134 (L) 136 - 145 mmol/L Final     Potassium   Date Value Ref Range Status   10/23/2021 3.9 3.5 - 5.2 mmol/L Final     Comment:     Slight hemolysis detected by analyzer.  Results may be affected.     CO2   Date Value Ref Range Status   10/23/2021 22.0 22.0 - 29.0 mmol/L Final     Chloride   Date Value Ref Range Status   10/23/2021 99 98 - 107 mmol/L Final     Anion Gap   Date Value Ref Range Status   10/23/2021 13.0 5.0 - 15.0 mmol/L Final     Creatinine   Date Value Ref Range Status   10/23/2021 0.63 0.57 - 1.00 mg/dL Final     BUN   Date Value Ref Range Status   10/23/2021 8 6 - 20 mg/dL Final     BUN/Creatinine Ratio   Date Value Ref Range Status   10/23/2021 12.7 7.0 - 25.0 Final     Calcium   Date Value Ref Range Status   10/23/2021 9.0 8.6 - 10.5 mg/dL Final     eGFR Non  Amer   Date Value Ref Range Status   10/23/2021 110 >60 mL/min/1.73 Final     Alkaline Phosphatase   Date Value Ref Range Status   10/23/2021 121 (H) 39 - 117 U/L Final     Total Protein   Date Value Ref Range Status   10/23/2021 8.0 6.0 - 8.5 g/dL Final     ALT (SGPT)   Date Value Ref Range Status   10/23/2021 25 1 - 33 U/L Final     AST (SGOT)   Date Value Ref Range Status   10/23/2021 30 1 - 32 U/L Final     Total Bilirubin   Date Value Ref Range Status   10/23/2021 0.4 0.0 - 1.2 mg/dL Final     Albumin   Date Value Ref Range Status   10/23/2021 4.30 3.50 - 5.20 g/dL Final     Globulin   Date Value Ref Range Status   10/23/2021 3.7 gm/dL Final     Lab Results   Component Value Date    WBC 11.27 (H) 03/21/2022    HGB 12.3 03/21/2022    HCT 39.4 03/21/2022    MCV 74.6 (L) 03/21/2022     03/21/2022     Lab Results   Component Value Date    NEUTROABS 7.25 (H) 03/21/2022    IRON 36 (L) 03/21/2022    IRON 40 12/14/2021    TIBC 417 03/21/2022    TIBC 516 12/14/2021    LABIRON 9 (L) 03/21/2022    LABIRON 8 (L) 12/14/2021    FERRITIN 51.68 03/21/2022    FERRITIN 61.88 12/14/2021    [id number] 610 03/21/2022    [id number] 513 12/14/2021    FOLATE 15.20 03/21/2022    FOLATE >20.00 12/14/2021     No results found for: , LABCA2, AFPTM, HCGQUANT, , CHROMGRNA, [id number], CEA,  REFLABREPO      PATHOLOGY:  * Cannot find OR log *         RADIOLOGY DATA :  No radiology results for the last 7 days        Assessment/Plan       1 . Leukocytosis:  -Patient has been having ongoing leukocytosis since 2014 with white blood cell count ranging between 12,000 and 19,000.  -CBC done on 3/21/2022 shows WBC improved to 11,000 with predominantly neutrophils.  -C-reactive protein was elevated at 1.33 consistent with inflammatory cause  -Peripheral blood flow cytometry done on 12/14/2021 does not show any evidence of any leukemia or lymphoma which was discussed with patient.  -Recommend continue with clinical monitoring for now.  -We will have patient return to clinic in 3 months with repeat CBC, iron studies, ferritin, B12, folate to be done prior to that  -If patient has any worsening leukocytosis in future neck step in the evaluation would be bone marrow biopsy which was discussed with patient today     2. Microcytosis:  -Iron studies done on 3/21/2022 is consistent with iron deficiency.  -Iron saturation is 9%; will increase her iron tablet to BID; advised pt if she can not tolerate to notify out office and we may have to consider IV iron; pt voices understanding.  -We will repeat anemia work-up upon next clinic visit in 3 months.        3. Thrombocytosis  -Reactive due to iron deficiency  -platelet count on 3/21/2022 improved to 430,000.  -continue to monitor             PHQ-9 Total Score: 0     Sanjuana Allison reports a pain score of 0.  Given her pain assessment as noted, treatment options were discussed and the following options were decided upon as a follow-up plan to address the patient's pain: no pain today.         Linda Begum, MARIA GUADALUPE  4/4/2022  13:29 CDT        Part of this note may be an electronic transcription/translation of spoken language to printed text using the Dragon Dictation System.          CC:

## 2022-04-26 ENCOUNTER — TELEMEDICINE (OUTPATIENT)
Dept: FAMILY MEDICINE CLINIC | Facility: TELEHEALTH | Age: 32
End: 2022-04-26

## 2022-04-26 DIAGNOSIS — J01.00 ACUTE NON-RECURRENT MAXILLARY SINUSITIS: Primary | ICD-10-CM

## 2022-04-26 PROCEDURE — 99213 OFFICE O/P EST LOW 20 MIN: CPT | Performed by: NURSE PRACTITIONER

## 2022-04-26 RX ORDER — BROMPHENIRAMINE MALEATE, PSEUDOEPHEDRINE HYDROCHLORIDE, AND DEXTROMETHORPHAN HYDROBROMIDE 2; 30; 10 MG/5ML; MG/5ML; MG/5ML
5 SYRUP ORAL 4 TIMES DAILY PRN
Qty: 120 ML | Refills: 0 | OUTPATIENT
Start: 2022-04-26 | End: 2022-05-14

## 2022-04-26 RX ORDER — DOXYCYCLINE HYCLATE 100 MG/1
100 CAPSULE ORAL 2 TIMES DAILY
Qty: 20 CAPSULE | Refills: 0 | Status: SHIPPED | OUTPATIENT
Start: 2022-04-26 | End: 2022-05-06

## 2022-04-26 RX ORDER — FLUTICASONE PROPIONATE 50 MCG
2 SPRAY, SUSPENSION (ML) NASAL DAILY PRN
Qty: 16 G | Refills: 0 | Status: SHIPPED | OUTPATIENT
Start: 2022-04-26

## 2022-04-26 NOTE — PROGRESS NOTES
HPI  Sanjuana Allison is a 31 y.o. female  presents with complaint of two week history of cough, congestion, sore throat, sinus pressure, runny nose, ear pressure, headache, PND, mucus is dark yellow, mild N/D.    Denies fever, chills, sweats, CP, SOA, vomiting.     Completed z-pack yesterday but it did not improve symptoms at all. Has taken mucinex which provides mild relief.     Review of Systems    Past Medical History:   Diagnosis Date   • Acid reflux    • Allergic    • Anxiety    • Depression    • Gallbladder abscess    • Migraines    • Sinusitis    • Stomach ulcer        Family History   Problem Relation Age of Onset   • Arthritis Mother    • Diabetes Mother    • Cancer Mother         breat cancer   • Hypertension Mother    • Hypertension Father    • Hypertension Maternal Grandmother    • Heart disease Maternal Grandfather    • Hypertension Maternal Grandfather        Social History     Socioeconomic History   • Marital status:    Tobacco Use   • Smoking status: Never Smoker   • Smokeless tobacco: Never Used   Substance and Sexual Activity   • Alcohol use: No   • Drug use: No   • Sexual activity: Defer     Birth control/protection: Injection         LMP 04/06/2022     PHYSICAL EXAM  Physical Exam   Constitutional: She appears well-developed and well-nourished.   HENT:   Head: Normocephalic.   Nose: Rhinorrhea present. Right sinus exhibits maxillary sinus tenderness. Left sinus exhibits maxillary sinus tenderness.   Frequent cough during visit   Neck: Neck normal appearance.  Pulmonary/Chest: Effort normal.   Neurological: She is alert.   Psychiatric: She has a normal mood and affect. Her speech is normal.       Diagnoses and all orders for this visit:    1. Acute non-recurrent maxillary sinusitis (Primary)  -     doxycycline (VIBRAMYCIN) 100 MG capsule; Take 1 capsule by mouth 2 (Two) Times a Day for 10 days.  Dispense: 20 capsule; Refill: 0  -     fluticasone (Flonase) 50 MCG/ACT nasal spray; 2  sprays into the nostril(s) as directed by provider Daily As Needed for Allergies.  Dispense: 16 g; Refill: 0  -     brompheniramine-pseudoephedrine-DM 30-2-10 MG/5ML syrup; Take 5 mL by mouth 4 (Four) Times a Day As Needed for Cough or Allergies.  Dispense: 120 mL; Refill: 0          FOLLOW-UP  As discussed during visit with [facility], if symptoms worsen or fail to improve, follow-up with PCP/Urgent Care/Emergency Department.    Patient verbalizes understanding of medications, instructions for treatment and follow-up.    MARIA GUADALUPE Ba  04/26/2022  12:42 EDT    This visit was performed via Telehealth.  This patient has been instructed to follow-up with their primary care provider if their symptoms worsen or the treatment provided does not resolve their illness.    Sanjuana Allison verbally consented to a telehealth visit. Sanjuana Allison was seen via telehealth using real-time video conferencing technology by MARIA GUADALUPE Ba. MarielaHafsa Allison was located at [place], [place], and MARI AGUADALUPE Ba was located in [place], [place].

## 2022-04-26 NOTE — PATIENT INSTRUCTIONS
Sinusitis, Adult  Sinusitis is inflammation of your sinuses. Sinuses are hollow spaces in the bones around your face. Your sinuses are located:  Around your eyes.  In the middle of your forehead.  Behind your nose.  In your cheekbones.  Mucus normally drains out of your sinuses. When your nasal tissues become inflamed or swollen, mucus can become trapped or blocked. This allows bacteria, viruses, and fungi to grow, which leads to infection. Most infections of the sinuses are caused by a virus.  Sinusitis can develop quickly. It can last for up to 4 weeks (acute) or for more than 12 weeks (chronic). Sinusitis often develops after a cold.  What are the causes?  This condition is caused by anything that creates swelling in the sinuses or stops mucus from draining. This includes:  Allergies.  Asthma.  Infection from bacteria or viruses.  Deformities or blockages in your nose or sinuses.  Abnormal growths in the nose (nasal polyps).  Pollutants, such as chemicals or irritants in the air.  Infection from fungi (rare).  What increases the risk?  You are more likely to develop this condition if you:  Have a weak body defense system (immune system).  Do a lot of swimming or diving.  Overuse nasal sprays.  Smoke.  What are the signs or symptoms?  The main symptoms of this condition are pain and a feeling of pressure around the affected sinuses. Other symptoms include:  Stuffy nose or congestion.  Thick drainage from your nose.  Swelling and warmth over the affected sinuses.  Headache.  Upper toothache.  A cough that may get worse at night.  Extra mucus that collects in the throat or the back of the nose (postnasal drip).  Decreased sense of smell and taste.  Fatigue.  A fever.  Sore throat.  Bad breath.  How is this diagnosed?  This condition is diagnosed based on:  Your symptoms.  Your medical history.  A physical exam.  Tests to find out if your condition is acute or chronic. This may include:  Checking your nose for nasal  polyps.  Viewing your sinuses using a device that has a light (endoscope).  Testing for allergies or bacteria.  Imaging tests, such as an MRI or CT scan.  In rare cases, a bone biopsy may be done to rule out more serious types of fungal sinus disease.  How is this treated?  Treatment for sinusitis depends on the cause and whether your condition is chronic or acute.  If caused by a virus, your symptoms should go away on their own within 10 days. You may be given medicines to relieve symptoms. They include:  Medicines that shrink swollen nasal passages (topical intranasal decongestants).  Medicines that treat allergies (antihistamines).  A spray that eases inflammation of the nostrils (topical intranasal corticosteroids).  Rinses that help get rid of thick mucus in your nose (nasal saline washes).  If caused by bacteria, your health care provider may recommend waiting to see if your symptoms improve. Most bacterial infections will get better without antibiotic medicine. You may be given antibiotics if you have:  A severe infection.  A weak immune system.  If caused by narrow nasal passages or nasal polyps, you may need to have surgery.  Follow these instructions at home:  Medicines  Take, use, or apply over-the-counter and prescription medicines only as told by your health care provider. These may include nasal sprays.  If you were prescribed an antibiotic medicine, take it as told by your health care provider. Do not stop taking the antibiotic even if you start to feel better.  Hydrate and humidify    Drink enough fluid to keep your urine pale yellow. Staying hydrated will help to thin your mucus.  Use a cool mist humidifier to keep the humidity level in your home above 50%.  Inhale steam for 10-15 minutes, 3-4 times a day, or as told by your health care provider. You can do this in the bathroom while a hot shower is running.  Limit your exposure to cool or dry air.    Rest  Rest as much as possible.  Sleep with your  head raised (elevated).  Make sure you get enough sleep each night.  General instructions    Apply a warm, moist washcloth to your face 3-4 times a day or as told by your health care provider. This will help with discomfort.  Wash your hands often with soap and water to reduce your exposure to germs. If soap and water are not available, use hand .  Do not smoke. Avoid being around people who are smoking (secondhand smoke).  Keep all follow-up visits as told by your health care provider. This is important.    Contact a health care provider if:  You have a fever.  Your symptoms get worse.  Your symptoms do not improve within 10 days.  Get help right away if:  You have a severe headache.  You have persistent vomiting.  You have severe pain or swelling around your face or eyes.  You have vision problems.  You develop confusion.  Your neck is stiff.  You have trouble breathing.  Summary  Sinusitis is soreness and inflammation of your sinuses. Sinuses are hollow spaces in the bones around your face.  This condition is caused by nasal tissues that become inflamed or swollen. The swelling traps or blocks the flow of mucus. This allows bacteria, viruses, and fungi to grow, which leads to infection.  If you were prescribed an antibiotic medicine, take it as told by your health care provider. Do not stop taking the antibiotic even if you start to feel better.  Keep all follow-up visits as told by your health care provider. This is important.  This information is not intended to replace advice given to you by your health care provider. Make sure you discuss any questions you have with your health care provider.  Document Revised: 05/20/2019 Document Reviewed: 05/20/2019  [employer] Patient Education © 2021 [employer]

## 2022-06-23 ENCOUNTER — LAB (OUTPATIENT)
Dept: LAB | Facility: HOSPITAL | Age: 32
End: 2022-06-23

## 2022-06-23 DIAGNOSIS — R71.8 MICROCYTOSIS: ICD-10-CM

## 2022-06-23 LAB
BASOPHILS # BLD AUTO: 0.06 10*3/MM3 (ref 0–0.2)
BASOPHILS NFR BLD AUTO: 0.4 % (ref 0–1.5)
DEPRECATED RDW RBC AUTO: 44.3 FL (ref 37–54)
EOSINOPHIL # BLD AUTO: 0.33 10*3/MM3 (ref 0–0.4)
EOSINOPHIL NFR BLD AUTO: 2.2 % (ref 0.3–6.2)
ERYTHROCYTE [DISTWIDTH] IN BLOOD BY AUTOMATED COUNT: 17.2 % (ref 12.3–15.4)
FERRITIN SERPL-MCNC: 65.95 NG/ML (ref 13–150)
FOLATE SERPL-MCNC: 16 NG/ML (ref 4.78–24.2)
HCT VFR BLD AUTO: 41.3 % (ref 34–46.6)
HGB BLD-MCNC: 12.9 G/DL (ref 12–15.9)
IMM GRANULOCYTES # BLD AUTO: 0.09 10*3/MM3 (ref 0–0.05)
IMM GRANULOCYTES NFR BLD AUTO: 0.6 % (ref 0–0.5)
IRON 24H UR-MRATE: 49 MCG/DL (ref 37–145)
IRON SATN MFR SERPL: 12 % (ref 20–50)
LYMPHOCYTES # BLD AUTO: 3.58 10*3/MM3 (ref 0.7–3.1)
LYMPHOCYTES NFR BLD AUTO: 23.6 % (ref 19.6–45.3)
MCH RBC QN AUTO: 23.6 PG (ref 26.6–33)
MCHC RBC AUTO-ENTMCNC: 31.2 G/DL (ref 31.5–35.7)
MCV RBC AUTO: 75.6 FL (ref 79–97)
MONOCYTES # BLD AUTO: 0.78 10*3/MM3 (ref 0.1–0.9)
MONOCYTES NFR BLD AUTO: 5.1 % (ref 5–12)
NEUTROPHILS NFR BLD AUTO: 10.32 10*3/MM3 (ref 1.7–7)
NEUTROPHILS NFR BLD AUTO: 68.1 % (ref 42.7–76)
NRBC BLD AUTO-RTO: 0 /100 WBC (ref 0–0.2)
PLATELET # BLD AUTO: 402 10*3/MM3 (ref 140–450)
PMV BLD AUTO: 8.3 FL (ref 6–12)
RBC # BLD AUTO: 5.46 10*6/MM3 (ref 3.77–5.28)
TIBC SERPL-MCNC: 408 MCG/DL (ref 298–536)
TRANSFERRIN SERPL-MCNC: 274 MG/DL (ref 200–360)
VIT B12 BLD-MCNC: 809 PG/ML (ref 211–946)
WBC NRBC COR # BLD: 15.16 10*3/MM3 (ref 3.4–10.8)

## 2022-06-23 PROCEDURE — 84466 ASSAY OF TRANSFERRIN: CPT

## 2022-06-23 PROCEDURE — 82728 ASSAY OF FERRITIN: CPT

## 2022-06-23 PROCEDURE — 85025 COMPLETE CBC W/AUTO DIFF WBC: CPT

## 2022-06-23 PROCEDURE — 83540 ASSAY OF IRON: CPT

## 2022-06-23 PROCEDURE — 82607 VITAMIN B-12: CPT

## 2022-06-23 PROCEDURE — 36415 COLL VENOUS BLD VENIPUNCTURE: CPT

## 2022-06-23 PROCEDURE — 82746 ASSAY OF FOLIC ACID SERUM: CPT

## 2022-06-24 ENCOUNTER — OFFICE VISIT (OUTPATIENT)
Dept: ONCOLOGY | Facility: CLINIC | Age: 32
End: 2022-06-24

## 2022-06-24 ENCOUNTER — LAB (OUTPATIENT)
Dept: ONCOLOGY | Facility: HOSPITAL | Age: 32
End: 2022-06-24

## 2022-06-24 VITALS
TEMPERATURE: 97.4 F | BODY MASS INDEX: 43.97 KG/M2 | SYSTOLIC BLOOD PRESSURE: 168 MMHG | DIASTOLIC BLOOD PRESSURE: 109 MMHG | HEART RATE: 104 BPM | WEIGHT: 268.3 LBS | OXYGEN SATURATION: 95 %

## 2022-06-24 DIAGNOSIS — D72.828 OTHER ELEVATED WHITE BLOOD CELL (WBC) COUNT: Primary | Chronic | ICD-10-CM

## 2022-06-24 DIAGNOSIS — D75.839 THROMBOCYTOSIS: Chronic | ICD-10-CM

## 2022-06-24 DIAGNOSIS — R71.8 MICROCYTOSIS: Chronic | ICD-10-CM

## 2022-06-24 PROBLEM — E61.1 IRON DEFICIENCY: Status: ACTIVE | Noted: 2022-06-24

## 2022-06-24 PROBLEM — K90.9 IRON MALABSORPTION: Status: ACTIVE | Noted: 2022-06-24

## 2022-06-24 PROCEDURE — 99214 OFFICE O/P EST MOD 30 MIN: CPT | Performed by: INTERNAL MEDICINE

## 2022-06-24 PROCEDURE — G0463 HOSPITAL OUTPT CLINIC VISIT: HCPCS | Performed by: INTERNAL MEDICINE

## 2022-06-24 RX ORDER — CITALOPRAM 10 MG/1
20 TABLET ORAL DAILY
COMMUNITY
Start: 2022-05-31 | End: 2023-02-16

## 2022-06-24 RX ORDER — CYANOCOBALAMIN 1000 UG/ML
1000 INJECTION, SOLUTION INTRAMUSCULAR; SUBCUTANEOUS
COMMUNITY
Start: 2022-05-31 | End: 2022-06-24 | Stop reason: ALTCHOICE

## 2022-06-24 RX ORDER — ERGOCALCIFEROL 1.25 MG/1
50000 CAPSULE ORAL
COMMUNITY
Start: 2022-05-31 | End: 2023-06-01

## 2022-06-24 RX ORDER — METFORMIN HYDROCHLORIDE 500 MG/1
500 TABLET, EXTENDED RELEASE ORAL DAILY
COMMUNITY
Start: 2022-05-31 | End: 2022-10-11

## 2022-06-24 RX ORDER — FAMOTIDINE 20 MG/1
20 TABLET, FILM COATED ORAL ONCE
Status: CANCELLED | OUTPATIENT
Start: 2022-07-01

## 2022-06-24 RX ORDER — DIPHENHYDRAMINE HCL 25 MG
25 CAPSULE ORAL ONCE
Status: CANCELLED | OUTPATIENT
Start: 2022-07-01

## 2022-06-24 RX ORDER — TIZANIDINE 4 MG/1
4 TABLET ORAL NIGHTLY PRN
COMMUNITY
End: 2022-06-30

## 2022-06-24 RX ORDER — ASPIRIN 325 MG
325 TABLET ORAL DAILY
COMMUNITY
End: 2023-02-16

## 2022-06-24 RX ORDER — TRAZODONE HYDROCHLORIDE 50 MG/1
50 TABLET ORAL DAILY
COMMUNITY
Start: 2022-05-31 | End: 2023-02-16

## 2022-06-24 RX ORDER — SODIUM CHLORIDE 9 MG/ML
250 INJECTION, SOLUTION INTRAVENOUS ONCE
Status: CANCELLED | OUTPATIENT
Start: 2022-07-01

## 2022-06-24 RX ORDER — FLUTICASONE PROPIONATE 50 MCG
2 SPRAY, SUSPENSION (ML) NASAL DAILY
COMMUNITY
Start: 2022-05-27 | End: 2023-02-16 | Stop reason: SDUPTHER

## 2022-06-24 RX ORDER — PANTOPRAZOLE SODIUM 40 MG/1
40 TABLET, DELAYED RELEASE ORAL DAILY
COMMUNITY
End: 2022-10-11

## 2022-06-24 RX ORDER — ACETAMINOPHEN 325 MG/1
650 TABLET ORAL ONCE
Status: CANCELLED | OUTPATIENT
Start: 2022-07-01

## 2022-06-24 RX ORDER — MONTELUKAST SODIUM 10 MG/1
10 TABLET ORAL
COMMUNITY
Start: 2022-05-27 | End: 2023-05-28

## 2022-06-24 NOTE — PROGRESS NOTES
DATE OF VISIT: 6/25/2022      REASON FOR VISIT: Leukocytosis, microcytosis due to iron deficiency, thrombocytosis      HISTORY OF PRESENT ILLNESS:   32-year-old female with medical problem consisting of anxiety/depression, migraine, seasonal allergies, irregular and heavy menstrual bleeding since menarche was initially seen in consultation on December 14, 2021 for leukocytosis, iron deficiency, microcytosis and thrombocytosis.  Patient is here for follow-up appointment today.  Denies any bleeding except for menstrual bleeding.  Denies any new lymph node enlargement.  Complains of arthralgias affecting upper extremity.  Denies any new chest pain or shortness of breath.              Past Medical History, Past Surgical History, Social History, Family History have been reviewed and are without significant changes except as mentioned.    Review of Systems   A comprehensive 14 point review of systems was performed and was negative except as mentioned in HPI.    Medications:  The current medication list was reviewed in the EMR    ALLERGIES:    Allergies   Allergen Reactions   • Flagyl [Metronidazole] Hives   • Nabumetone Nausea Only   • Penicillins Hives   • Sulfa Antibiotics Hives   • Amiodarone Rash   • Latex Rash       Objective      Vitals:    06/24/22 1026   BP: (!) 168/109   Pulse: 104   Temp: 97.4 °F (36.3 °C)   TempSrc: Temporal   SpO2: 95%   Weight: 122 kg (268 lb 4.8 oz)   PainSc:   7   PainLoc: Generalized     No flowsheet data found.    Physical Exam  Pulmonary:      Breath sounds: Normal breath sounds.   Neurological:      Mental Status: She is alert and oriented to person, place, and time.           RECENT LABS:  Glucose   Date Value Ref Range Status   10/23/2021 122 (H) 65 - 99 mg/dL Final     Sodium   Date Value Ref Range Status   05/28/2022 137 136 - 145 mmol/L Final     Potassium   Date Value Ref Range Status   05/28/2022 3.9 3.5 - 5.1 mmol/L Final     Total CO2   Date Value Ref Range Status   05/28/2022  24 21 - 31 mmol/L Final     Chloride   Date Value Ref Range Status   05/28/2022 104 98 - 107 mmol/L Final     Anion Gap   Date Value Ref Range Status   10/23/2021 13.0 5.0 - 15.0 mmol/L Final     Creatinine   Date Value Ref Range Status   05/28/2022 0.5 (L) 0.7 - 1.3 mg/dL Final     BUN   Date Value Ref Range Status   05/28/2022 9 7 - 25 mg/dL Final     BUN/Creatinine Ratio   Date Value Ref Range Status   10/23/2021 12.7 7.0 - 25.0 Final     Calcium   Date Value Ref Range Status   05/28/2022 8.6 8.6 - 10.3 mg/dL Final     eGFR Non  Amer   Date Value Ref Range Status   10/23/2021 110 >60 mL/min/1.73 Final     Alkaline Phosphatase   Date Value Ref Range Status   05/28/2022 84 34 - 104 U/L Final     Total Protein   Date Value Ref Range Status   10/23/2021 8.0 6.0 - 8.5 g/dL Final     ALT (SGPT)   Date Value Ref Range Status   05/28/2022 15 7 - 52 U/L Final     AST (SGOT)   Date Value Ref Range Status   05/28/2022 17 13 - 39 U/L Final     Total Bilirubin   Date Value Ref Range Status   05/28/2022 0.32 0.3 - 1.0 mg/dL Final     Albumin   Date Value Ref Range Status   05/28/2022 3.9 3.5 - 5.7 g/dL Final     Globulin   Date Value Ref Range Status   10/23/2021 3.7 gm/dL Final     Lab Results   Component Value Date    WBC 15.16 (H) 06/23/2022    HGB 12.9 06/23/2022    HCT 41.3 06/23/2022    MCV 75.6 (L) 06/23/2022     06/23/2022     Lab Results   Component Value Date    NEUTROABS 10.32 (H) 06/23/2022    IRON 49 06/23/2022    IRON 41 05/28/2022    IRON 36 (L) 03/21/2022    TIBC 408 06/23/2022    TIBC 409 05/28/2022    TIBC 417 03/21/2022    LABIRON 12 (L) 06/23/2022    LABIRON 10 (L) 05/28/2022    LABIRON 9 (L) 03/21/2022    FERRITIN 65.95 06/23/2022    FERRITIN 36 05/28/2022    FERRITIN 51.68 03/21/2022    [id number] 809 06/23/2022    [id number] 282 05/28/2022    [id number] 610 03/21/2022    FOLATE 16.00 06/23/2022    FOLATE 21.60 06/22/2022    FOLATE 15.20 03/21/2022     No results found for: , LABCA2,  AFPTM, HCGQUANT, , CHROMGRNA, [id number], CEA, REFLABREPO      PATHOLOGY:  * Cannot find OR log *         RADIOLOGY DATA :  No radiology results for the last 7 days        Assessment & Plan     1.  Leukocytosis:  - Patient has been having ongoing leukocytosis with white blood cell count ranging between 12,000 and 19,000.  - CBC done on June 23, 2022 shows white blood cell count is 15,000 which is again predominantly increase in neutrophils.  - C-reactive protein in the past has been elevated at 1.33 consistent with inflammatory cause  - Peripheral blood flow cytometry on December 14, 2021 was negative for any leukemia or lymphoma.  - Recommend continuing with clinical monitoring for now  - We will have patient return to clinic in 3 months with repeat CBC, iron studies, ferritin, B12 and folate to be done prior to that    2.  Iron deficiency anemia with microcytosis  - Patient is currently on ferrous sulfate and B12 p.o. daily  - Recent anemia work-up shows hemoglobin is 12.9.  MCV 75  - Iron studies are showing minimal improvement.  Iron saturation is 12% with ferritin of 65.  -Despite of taking ferrous sulfate 2 times daily patient's iron level is not showing any significant improvement.  Likely patient has iron malabsorption.  Recommend starting intravenous Venofer starting next week.  Side effect of Venofer including allergic reaction were discussed with patient  - Recommend decreasing ferrous sulfate to 1 tablet p.o. daily after starting Venofer.  - Recommend continuing with  B12 p.o. daily.    3.  Thrombocytosis:  - Reactive due to iron deficiency:  - Platelet count is improved to 402,000.  Will monitor with CBC    4.  High blood pressure  - Blood pressure is elevated at 168/109.  Patient is complaining of pain secondary to fall yesterday.  States she has not taken her anxiety medication as well.  Recommend continue monitoring closely at home and if blood pressure remains elevated following up with primary  medical provider to start antihypertensive medication.      5.  Health maintenance: Patient does not smoke                     PHQ-9 Total Score: 1   -Patient is not homicidal or suicidal.  No acute intervention required.    Sanjuana Allison reports a pain score of 7.  Given her pain assessment as noted, treatment options were discussed and the following options were decided upon as a follow-up plan to address the patient's pain: continuation of current treatment plan for pain.         Ankit Espinoza MD  6/25/2022  10:46 CDT        Part of this note may be an electronic transcription/translation of spoken language to printed text using the Dragon Dictation System.          CC:

## 2022-06-30 ENCOUNTER — APPOINTMENT (OUTPATIENT)
Dept: MRI IMAGING | Facility: HOSPITAL | Age: 32
End: 2022-06-30

## 2022-06-30 ENCOUNTER — HOSPITAL ENCOUNTER (EMERGENCY)
Facility: HOSPITAL | Age: 32
Discharge: HOME | End: 2022-06-30
Attending: EMERGENCY MEDICINE | Admitting: EMERGENCY MEDICINE

## 2022-06-30 ENCOUNTER — APPOINTMENT (OUTPATIENT)
Dept: GENERAL RADIOLOGY | Facility: HOSPITAL | Age: 32
End: 2022-06-30

## 2022-06-30 ENCOUNTER — APPOINTMENT (OUTPATIENT)
Dept: CT IMAGING | Facility: HOSPITAL | Age: 32
End: 2022-06-30

## 2022-06-30 VITALS
BODY MASS INDEX: 43.39 KG/M2 | OXYGEN SATURATION: 94 % | RESPIRATION RATE: 18 BRPM | DIASTOLIC BLOOD PRESSURE: 73 MMHG | TEMPERATURE: 98.1 F | SYSTOLIC BLOOD PRESSURE: 126 MMHG | HEART RATE: 92 BPM | HEIGHT: 66 IN | WEIGHT: 270 LBS

## 2022-06-30 DIAGNOSIS — G43.109 COMPLICATED MIGRAINE: Primary | ICD-10-CM

## 2022-06-30 LAB
ALBUMIN SERPL-MCNC: 3.7 G/DL (ref 3.5–5.2)
ALBUMIN/GLOB SERPL: 1.2 G/DL
ALP SERPL-CCNC: 95 U/L (ref 39–117)
ALT SERPL W P-5'-P-CCNC: 20 U/L (ref 1–33)
ANION GAP SERPL CALCULATED.3IONS-SCNC: 9 MMOL/L (ref 5–15)
ANISOCYTOSIS BLD QL: NORMAL
APTT PPP: 28.8 SECONDS (ref 20–40.3)
AST SERPL-CCNC: 14 U/L (ref 1–32)
BASOPHILS # BLD AUTO: 0.08 10*3/MM3 (ref 0–0.2)
BASOPHILS NFR BLD AUTO: 0.5 % (ref 0–1.5)
BILIRUB SERPL-MCNC: 0.2 MG/DL (ref 0–1.2)
BUN SERPL-MCNC: 10 MG/DL (ref 6–20)
BUN/CREAT SERPL: 17.2 (ref 7–25)
CALCIUM SPEC-SCNC: 8.7 MG/DL (ref 8.6–10.5)
CHLORIDE SERPL-SCNC: 107 MMOL/L (ref 98–107)
CO2 SERPL-SCNC: 23 MMOL/L (ref 22–29)
CREAT SERPL-MCNC: 0.58 MG/DL (ref 0.57–1)
DEPRECATED RDW RBC AUTO: 43.8 FL (ref 37–54)
EGFRCR SERPLBLD CKD-EPI 2021: 123.5 ML/MIN/1.73
EOSINOPHIL # BLD AUTO: 0.31 10*3/MM3 (ref 0–0.4)
EOSINOPHIL NFR BLD AUTO: 2.1 % (ref 0.3–6.2)
ERYTHROCYTE [DISTWIDTH] IN BLOOD BY AUTOMATED COUNT: 17.2 % (ref 12.3–15.4)
GLOBULIN UR ELPH-MCNC: 3.1 GM/DL
GLUCOSE SERPL-MCNC: 100 MG/DL (ref 65–99)
HCT VFR BLD AUTO: 39.3 % (ref 34–46.6)
HGB BLD-MCNC: 12.6 G/DL (ref 12–15.9)
HOLD SPECIMEN: NORMAL
HOLD SPECIMEN: NORMAL
IMM GRANULOCYTES # BLD AUTO: 0.07 10*3/MM3 (ref 0–0.05)
IMM GRANULOCYTES NFR BLD AUTO: 0.5 % (ref 0–0.5)
INR PPP: 1.04 (ref 0.8–1.2)
LYMPHOCYTES # BLD AUTO: 3.54 10*3/MM3 (ref 0.7–3.1)
LYMPHOCYTES NFR BLD AUTO: 24.1 % (ref 19.6–45.3)
MCH RBC QN AUTO: 23.7 PG (ref 26.6–33)
MCHC RBC AUTO-ENTMCNC: 32.1 G/DL (ref 31.5–35.7)
MCV RBC AUTO: 73.9 FL (ref 79–97)
MONOCYTES # BLD AUTO: 0.79 10*3/MM3 (ref 0.1–0.9)
MONOCYTES NFR BLD AUTO: 5.4 % (ref 5–12)
NEUTROPHILS NFR BLD AUTO: 67.4 % (ref 42.7–76)
NEUTROPHILS NFR BLD AUTO: 9.89 10*3/MM3 (ref 1.7–7)
NRBC BLD AUTO-RTO: 0 /100 WBC (ref 0–0.2)
PLAT MORPH BLD: NORMAL
PLATELET # BLD AUTO: 297 10*3/MM3 (ref 140–450)
PMV BLD AUTO: 9.4 FL (ref 6–12)
POTASSIUM SERPL-SCNC: 3.9 MMOL/L (ref 3.5–5.2)
PROT SERPL-MCNC: 6.8 G/DL (ref 6–8.5)
PROTHROMBIN TIME: 13.4 SECONDS (ref 11.1–15.3)
RBC # BLD AUTO: 5.32 10*6/MM3 (ref 3.77–5.28)
SODIUM SERPL-SCNC: 139 MMOL/L (ref 136–145)
TROPONIN T SERPL-MCNC: <0.01 NG/ML (ref 0–0.03)
WBC MORPH BLD: NORMAL
WBC NRBC COR # BLD: 14.68 10*3/MM3 (ref 3.4–10.8)
WHOLE BLOOD HOLD COAG: NORMAL
WHOLE BLOOD HOLD SPECIMEN: NORMAL
WHOLE BLOOD HOLD SPECIMEN: NORMAL

## 2022-06-30 PROCEDURE — A9579 GAD-BASE MR CONTRAST NOS,1ML: HCPCS | Performed by: EMERGENCY MEDICINE

## 2022-06-30 PROCEDURE — 93010 ELECTROCARDIOGRAM REPORT: CPT | Performed by: INTERNAL MEDICINE

## 2022-06-30 PROCEDURE — 93005 ELECTROCARDIOGRAM TRACING: CPT | Performed by: EMERGENCY MEDICINE

## 2022-06-30 PROCEDURE — 80053 COMPREHEN METABOLIC PANEL: CPT | Performed by: EMERGENCY MEDICINE

## 2022-06-30 PROCEDURE — 99284 EMERGENCY DEPT VISIT MOD MDM: CPT

## 2022-06-30 PROCEDURE — 85730 THROMBOPLASTIN TIME PARTIAL: CPT | Performed by: EMERGENCY MEDICINE

## 2022-06-30 PROCEDURE — 84484 ASSAY OF TROPONIN QUANT: CPT | Performed by: EMERGENCY MEDICINE

## 2022-06-30 PROCEDURE — 70450 CT HEAD/BRAIN W/O DYE: CPT

## 2022-06-30 PROCEDURE — 96361 HYDRATE IV INFUSION ADD-ON: CPT

## 2022-06-30 PROCEDURE — 99204 OFFICE O/P NEW MOD 45 MIN: CPT | Performed by: PSYCHIATRY & NEUROLOGY

## 2022-06-30 PROCEDURE — 71045 X-RAY EXAM CHEST 1 VIEW: CPT

## 2022-06-30 PROCEDURE — 25010000002 DIPHENHYDRAMINE PER 50 MG: Performed by: EMERGENCY MEDICINE

## 2022-06-30 PROCEDURE — 25010000002 KETOROLAC TROMETHAMINE PER 15 MG: Performed by: EMERGENCY MEDICINE

## 2022-06-30 PROCEDURE — 96375 TX/PRO/DX INJ NEW DRUG ADDON: CPT

## 2022-06-30 PROCEDURE — 85025 COMPLETE CBC W/AUTO DIFF WBC: CPT | Performed by: EMERGENCY MEDICINE

## 2022-06-30 PROCEDURE — 96374 THER/PROPH/DIAG INJ IV PUSH: CPT

## 2022-06-30 PROCEDURE — 25010000002 METOCLOPRAMIDE PER 10 MG: Performed by: EMERGENCY MEDICINE

## 2022-06-30 PROCEDURE — 85610 PROTHROMBIN TIME: CPT | Performed by: EMERGENCY MEDICINE

## 2022-06-30 PROCEDURE — 70553 MRI BRAIN STEM W/O & W/DYE: CPT

## 2022-06-30 PROCEDURE — 25010000002 GADOTERIDOL PER 1 ML: Performed by: EMERGENCY MEDICINE

## 2022-06-30 PROCEDURE — 85007 BL SMEAR W/DIFF WBC COUNT: CPT | Performed by: EMERGENCY MEDICINE

## 2022-06-30 RX ORDER — METOCLOPRAMIDE HYDROCHLORIDE 5 MG/ML
10 INJECTION INTRAMUSCULAR; INTRAVENOUS ONCE
Status: COMPLETED | OUTPATIENT
Start: 2022-06-30 | End: 2022-06-30

## 2022-06-30 RX ORDER — SODIUM CHLORIDE 0.9 % (FLUSH) 0.9 %
10 SYRINGE (ML) INJECTION AS NEEDED
Status: DISCONTINUED | OUTPATIENT
Start: 2022-06-30 | End: 2022-07-01 | Stop reason: HOSPADM

## 2022-06-30 RX ORDER — KETOROLAC TROMETHAMINE 30 MG/ML
30 INJECTION, SOLUTION INTRAMUSCULAR; INTRAVENOUS ONCE
Status: COMPLETED | OUTPATIENT
Start: 2022-06-30 | End: 2022-06-30

## 2022-06-30 RX ORDER — DIPHENHYDRAMINE HYDROCHLORIDE 50 MG/ML
25 INJECTION INTRAMUSCULAR; INTRAVENOUS ONCE
Status: COMPLETED | OUTPATIENT
Start: 2022-06-30 | End: 2022-06-30

## 2022-06-30 RX ADMIN — METOCLOPRAMIDE 10 MG: 5 INJECTION, SOLUTION INTRAMUSCULAR; INTRAVENOUS at 19:56

## 2022-06-30 RX ADMIN — DIPHENHYDRAMINE HYDROCHLORIDE 25 MG: 50 INJECTION INTRAMUSCULAR; INTRAVENOUS at 19:57

## 2022-06-30 RX ADMIN — GADOTERIDOL 20 ML: 279.3 INJECTION, SOLUTION INTRAVENOUS at 19:35

## 2022-06-30 RX ADMIN — KETOROLAC TROMETHAMINE 30 MG: 30 INJECTION, SOLUTION INTRAMUSCULAR; INTRAVENOUS at 19:55

## 2022-06-30 RX ADMIN — SODIUM CHLORIDE 500 ML: 9 INJECTION, SOLUTION INTRAVENOUS at 19:53

## 2022-07-01 ENCOUNTER — INFUSION (OUTPATIENT)
Dept: ONCOLOGY | Facility: HOSPITAL | Age: 32
End: 2022-07-01

## 2022-07-01 VITALS — HEART RATE: 90 BPM | TEMPERATURE: 98.9 F | SYSTOLIC BLOOD PRESSURE: 134 MMHG | DIASTOLIC BLOOD PRESSURE: 89 MMHG

## 2022-07-01 DIAGNOSIS — K90.9 IRON MALABSORPTION: ICD-10-CM

## 2022-07-01 DIAGNOSIS — E61.1 IRON DEFICIENCY: Primary | ICD-10-CM

## 2022-07-01 PROCEDURE — 25010000002 IRON SUCROSE PER 1 MG: Performed by: INTERNAL MEDICINE

## 2022-07-01 PROCEDURE — 63710000001 DIPHENHYDRAMINE PER 50 MG: Performed by: INTERNAL MEDICINE

## 2022-07-01 PROCEDURE — 96365 THER/PROPH/DIAG IV INF INIT: CPT | Performed by: INTERNAL MEDICINE

## 2022-07-01 RX ORDER — FAMOTIDINE 20 MG/1
20 TABLET, FILM COATED ORAL ONCE
Status: COMPLETED | OUTPATIENT
Start: 2022-07-01 | End: 2022-07-01

## 2022-07-01 RX ORDER — ACETAMINOPHEN 325 MG/1
650 TABLET ORAL ONCE
Status: COMPLETED | OUTPATIENT
Start: 2022-07-01 | End: 2022-07-01

## 2022-07-01 RX ORDER — SODIUM CHLORIDE 9 MG/ML
250 INJECTION, SOLUTION INTRAVENOUS ONCE
Status: COMPLETED | OUTPATIENT
Start: 2022-07-01 | End: 2022-07-01

## 2022-07-01 RX ORDER — DIPHENHYDRAMINE HCL 25 MG
25 CAPSULE ORAL ONCE
Status: COMPLETED | OUTPATIENT
Start: 2022-07-01 | End: 2022-07-01

## 2022-07-01 RX ORDER — SODIUM CHLORIDE 9 MG/ML
250 INJECTION, SOLUTION INTRAVENOUS ONCE
Status: CANCELLED | OUTPATIENT
Start: 2022-07-03

## 2022-07-01 RX ORDER — FAMOTIDINE 20 MG/1
20 TABLET, FILM COATED ORAL ONCE
Status: CANCELLED | OUTPATIENT
Start: 2022-07-03

## 2022-07-01 RX ORDER — DIPHENHYDRAMINE HCL 25 MG
25 CAPSULE ORAL ONCE
Status: CANCELLED | OUTPATIENT
Start: 2022-07-03

## 2022-07-01 RX ORDER — ACETAMINOPHEN 325 MG/1
650 TABLET ORAL ONCE
Status: CANCELLED | OUTPATIENT
Start: 2022-07-03

## 2022-07-01 RX ADMIN — SODIUM CHLORIDE 250 ML: 9 INJECTION, SOLUTION INTRAVENOUS at 08:56

## 2022-07-01 RX ADMIN — ACETAMINOPHEN 650 MG: 325 TABLET, FILM COATED ORAL at 08:26

## 2022-07-01 RX ADMIN — DIPHENHYDRAMINE HYDROCHLORIDE 25 MG: 25 CAPSULE ORAL at 08:26

## 2022-07-01 RX ADMIN — FAMOTIDINE 20 MG: 20 TABLET ORAL at 08:26

## 2022-07-01 RX ADMIN — IRON SUCROSE 200 MG: 20 INJECTION, SOLUTION INTRAVENOUS at 08:56

## 2022-07-06 ENCOUNTER — INFUSION (OUTPATIENT)
Dept: ONCOLOGY | Facility: HOSPITAL | Age: 32
End: 2022-07-06

## 2022-07-06 VITALS — DIASTOLIC BLOOD PRESSURE: 80 MMHG | SYSTOLIC BLOOD PRESSURE: 151 MMHG | HEART RATE: 121 BPM | TEMPERATURE: 96.9 F

## 2022-07-06 DIAGNOSIS — E61.1 IRON DEFICIENCY: Primary | ICD-10-CM

## 2022-07-06 DIAGNOSIS — K90.9 IRON MALABSORPTION: ICD-10-CM

## 2022-07-06 PROCEDURE — 63710000001 DIPHENHYDRAMINE PER 50 MG: Performed by: INTERNAL MEDICINE

## 2022-07-06 PROCEDURE — 25010000002 IRON SUCROSE PER 1 MG: Performed by: INTERNAL MEDICINE

## 2022-07-06 PROCEDURE — 96365 THER/PROPH/DIAG IV INF INIT: CPT | Performed by: INTERNAL MEDICINE

## 2022-07-06 RX ORDER — ACETAMINOPHEN 325 MG/1
650 TABLET ORAL ONCE
Status: COMPLETED | OUTPATIENT
Start: 2022-07-06 | End: 2022-07-06

## 2022-07-06 RX ORDER — FAMOTIDINE 20 MG/1
20 TABLET, FILM COATED ORAL ONCE
Status: CANCELLED | OUTPATIENT
Start: 2022-07-08

## 2022-07-06 RX ORDER — DIPHENHYDRAMINE HCL 25 MG
25 CAPSULE ORAL ONCE
Status: CANCELLED | OUTPATIENT
Start: 2022-07-08

## 2022-07-06 RX ORDER — DIPHENHYDRAMINE HCL 25 MG
25 CAPSULE ORAL ONCE
Status: COMPLETED | OUTPATIENT
Start: 2022-07-06 | End: 2022-07-06

## 2022-07-06 RX ORDER — SODIUM CHLORIDE 9 MG/ML
250 INJECTION, SOLUTION INTRAVENOUS ONCE
Status: COMPLETED | OUTPATIENT
Start: 2022-07-06 | End: 2022-07-06

## 2022-07-06 RX ORDER — ACETAMINOPHEN 325 MG/1
650 TABLET ORAL ONCE
Status: CANCELLED | OUTPATIENT
Start: 2022-07-08

## 2022-07-06 RX ORDER — SODIUM CHLORIDE 9 MG/ML
250 INJECTION, SOLUTION INTRAVENOUS ONCE
Status: CANCELLED | OUTPATIENT
Start: 2022-07-08

## 2022-07-06 RX ORDER — FAMOTIDINE 20 MG/1
20 TABLET, FILM COATED ORAL ONCE
Status: COMPLETED | OUTPATIENT
Start: 2022-07-06 | End: 2022-07-06

## 2022-07-06 RX ADMIN — ACETAMINOPHEN 650 MG: 325 TABLET, FILM COATED ORAL at 14:45

## 2022-07-06 RX ADMIN — SODIUM CHLORIDE 250 ML: 9 INJECTION, SOLUTION INTRAVENOUS at 14:45

## 2022-07-06 RX ADMIN — FAMOTIDINE 20 MG: 20 TABLET ORAL at 14:45

## 2022-07-06 RX ADMIN — DIPHENHYDRAMINE HYDROCHLORIDE 25 MG: 25 CAPSULE ORAL at 14:45

## 2022-07-06 RX ADMIN — IRON SUCROSE 200 MG: 20 INJECTION, SOLUTION INTRAVENOUS at 15:15

## 2022-07-08 ENCOUNTER — INFUSION (OUTPATIENT)
Dept: ONCOLOGY | Facility: HOSPITAL | Age: 32
End: 2022-07-08

## 2022-07-08 ENCOUNTER — HOSPITAL ENCOUNTER (OUTPATIENT)
Dept: INTERVENTIONAL RADIOLOGY/VASCULAR | Facility: HOSPITAL | Age: 32
Setting detail: HOSPITAL OUTPATIENT SURGERY
Discharge: HOME | End: 2022-07-08

## 2022-07-08 ENCOUNTER — HOSPITAL ENCOUNTER (OUTPATIENT)
Dept: ULTRASOUND IMAGING | Facility: HOSPITAL | Age: 32
Discharge: HOME | End: 2022-07-08

## 2022-07-08 VITALS — SYSTOLIC BLOOD PRESSURE: 162 MMHG | DIASTOLIC BLOOD PRESSURE: 66 MMHG | TEMPERATURE: 97.5 F | HEART RATE: 89 BPM

## 2022-07-08 DIAGNOSIS — T80.810A: ICD-10-CM

## 2022-07-08 DIAGNOSIS — K90.9 IRON MALABSORPTION: ICD-10-CM

## 2022-07-08 DIAGNOSIS — E61.1 IRON DEFICIENCY: Primary | ICD-10-CM

## 2022-07-08 PROCEDURE — 36410 VNPNXR 3YR/> PHY/QHP DX/THER: CPT

## 2022-07-08 PROCEDURE — 63710000001 DIPHENHYDRAMINE PER 50 MG: Performed by: INTERNAL MEDICINE

## 2022-07-08 PROCEDURE — 96365 THER/PROPH/DIAG IV INF INIT: CPT | Performed by: INTERNAL MEDICINE

## 2022-07-08 PROCEDURE — C1751 CATH, INF, PER/CENT/MIDLINE: HCPCS

## 2022-07-08 PROCEDURE — 25010000002 IRON SUCROSE PER 1 MG: Performed by: INTERNAL MEDICINE

## 2022-07-08 PROCEDURE — 76937 US GUIDE VASCULAR ACCESS: CPT

## 2022-07-08 RX ORDER — SODIUM CHLORIDE 9 MG/ML
250 INJECTION, SOLUTION INTRAVENOUS ONCE
Status: COMPLETED | OUTPATIENT
Start: 2022-07-08 | End: 2022-07-08

## 2022-07-08 RX ORDER — FAMOTIDINE 20 MG/1
20 TABLET, FILM COATED ORAL ONCE
Status: COMPLETED | OUTPATIENT
Start: 2022-07-08 | End: 2022-07-08

## 2022-07-08 RX ORDER — DIPHENHYDRAMINE HCL 25 MG
25 CAPSULE ORAL ONCE
Status: COMPLETED | OUTPATIENT
Start: 2022-07-08 | End: 2022-07-08

## 2022-07-08 RX ORDER — SODIUM CHLORIDE 9 MG/ML
250 INJECTION, SOLUTION INTRAVENOUS ONCE
Status: CANCELLED | OUTPATIENT
Start: 2022-07-10

## 2022-07-08 RX ORDER — ACETAMINOPHEN 325 MG/1
650 TABLET ORAL ONCE
Status: CANCELLED | OUTPATIENT
Start: 2022-07-10

## 2022-07-08 RX ORDER — FAMOTIDINE 20 MG/1
20 TABLET, FILM COATED ORAL ONCE
Status: CANCELLED | OUTPATIENT
Start: 2022-07-10

## 2022-07-08 RX ORDER — DIPHENHYDRAMINE HCL 25 MG
25 CAPSULE ORAL ONCE
Status: CANCELLED | OUTPATIENT
Start: 2022-07-10

## 2022-07-08 RX ORDER — ACETAMINOPHEN 325 MG/1
650 TABLET ORAL ONCE
Status: COMPLETED | OUTPATIENT
Start: 2022-07-08 | End: 2022-07-08

## 2022-07-08 RX ADMIN — SODIUM CHLORIDE 250 ML: 9 INJECTION, SOLUTION INTRAVENOUS at 09:54

## 2022-07-08 RX ADMIN — ACETAMINOPHEN 650 MG: 325 TABLET, FILM COATED ORAL at 09:54

## 2022-07-08 RX ADMIN — FAMOTIDINE 20 MG: 20 TABLET ORAL at 09:54

## 2022-07-08 RX ADMIN — DIPHENHYDRAMINE HYDROCHLORIDE 25 MG: 25 CAPSULE ORAL at 09:54

## 2022-07-08 RX ADMIN — IRON SUCROSE 200 MG: 20 INJECTION, SOLUTION INTRAVENOUS at 10:29

## 2022-07-08 NOTE — PROGRESS NOTES
TWO PATIENT IDENTIFIERS WERE USED. THE PATIENT WAS DRAPED WITH A FULL BODY DRAPE AND THE PATIENT'S RIGHT ARM WAS PREPPED WITH CHLORA PREP. ULTRASOUND WAS USED TO LOCALIZE THERIGHT BASILIC VEIN. SUBCUTANEOUS TISSUE AT THE CATHETER SITE WAS INFILTRATED WITH 2% LIDOCAINE. UNDER ULTRASOUND GUIDANCE, THE VEIN WAS ACCESSED WITH A 21 GAUGE  NEEDLE. AN 0.018 WIRE WAS THEN THREADED THROUGH THE NEEDLE. THE 21 GAUGE NEEDLE WAS REMOVED AND A 4 Romanian SHEATH WAS PLACED OVER THE WIRE INTO THE VEIN.THE MIDLINE CATHETER WAS TRIMMED TO 20CM. THE MIDLINE CATHETER WAS THEN PLACED OVER THE WIRE INTO THE VEIN, THE SHEATH WAS PEELED AWAY, WIRE WAS REMOVED. CATHETER WAS FLUSHED WITH NORMAL SALINE AND CATHETER TIP APPLIED. BIOPATCH PLACED. CATHETER SECURED WITH STAT LOCK AND TEGADERM. PATIENT TOLERATED PROCEDURE WELL. THIS WAS DONE IN THE ANGIOSUITE      IMPRESSION:SUCCESSFUL PLACEMENT OF SINGLE LUMEN MIDLINE.           Talia Lehman  7/8/2022  09:46 CDT

## 2022-07-11 ENCOUNTER — INFUSION (OUTPATIENT)
Dept: ONCOLOGY | Facility: HOSPITAL | Age: 32
End: 2022-07-11

## 2022-07-11 VITALS — SYSTOLIC BLOOD PRESSURE: 114 MMHG | DIASTOLIC BLOOD PRESSURE: 69 MMHG | TEMPERATURE: 96.6 F | HEART RATE: 103 BPM

## 2022-07-11 DIAGNOSIS — E61.1 IRON DEFICIENCY: Primary | ICD-10-CM

## 2022-07-11 DIAGNOSIS — K90.9 IRON MALABSORPTION: ICD-10-CM

## 2022-07-11 PROCEDURE — 63710000001 DIPHENHYDRAMINE PER 50 MG: Performed by: INTERNAL MEDICINE

## 2022-07-11 PROCEDURE — 96365 THER/PROPH/DIAG IV INF INIT: CPT | Performed by: INTERNAL MEDICINE

## 2022-07-11 PROCEDURE — 25010000002 IRON SUCROSE PER 1 MG: Performed by: INTERNAL MEDICINE

## 2022-07-11 RX ORDER — DIPHENHYDRAMINE HCL 25 MG
25 CAPSULE ORAL ONCE
Status: CANCELLED | OUTPATIENT
Start: 2022-07-12

## 2022-07-11 RX ORDER — SODIUM CHLORIDE 9 MG/ML
250 INJECTION, SOLUTION INTRAVENOUS ONCE
Status: CANCELLED | OUTPATIENT
Start: 2022-07-12

## 2022-07-11 RX ORDER — FAMOTIDINE 20 MG/1
20 TABLET, FILM COATED ORAL ONCE
Status: CANCELLED | OUTPATIENT
Start: 2022-07-12

## 2022-07-11 RX ORDER — ACETAMINOPHEN 325 MG/1
650 TABLET ORAL ONCE
Status: COMPLETED | OUTPATIENT
Start: 2022-07-11 | End: 2022-07-11

## 2022-07-11 RX ORDER — ACETAMINOPHEN 325 MG/1
650 TABLET ORAL ONCE
Status: CANCELLED | OUTPATIENT
Start: 2022-07-12

## 2022-07-11 RX ORDER — SODIUM CHLORIDE 9 MG/ML
250 INJECTION, SOLUTION INTRAVENOUS ONCE
Status: COMPLETED | OUTPATIENT
Start: 2022-07-11 | End: 2022-07-11

## 2022-07-11 RX ORDER — FAMOTIDINE 20 MG/1
20 TABLET, FILM COATED ORAL ONCE
Status: COMPLETED | OUTPATIENT
Start: 2022-07-11 | End: 2022-07-11

## 2022-07-11 RX ORDER — DIPHENHYDRAMINE HCL 25 MG
25 CAPSULE ORAL ONCE
Status: COMPLETED | OUTPATIENT
Start: 2022-07-11 | End: 2022-07-11

## 2022-07-11 RX ADMIN — IRON SUCROSE 200 MG: 20 INJECTION, SOLUTION INTRAVENOUS at 14:43

## 2022-07-11 RX ADMIN — SODIUM CHLORIDE 250 ML: 9 INJECTION, SOLUTION INTRAVENOUS at 14:10

## 2022-07-11 RX ADMIN — ACETAMINOPHEN 650 MG: 325 TABLET, FILM COATED ORAL at 14:11

## 2022-07-11 RX ADMIN — FAMOTIDINE 20 MG: 20 TABLET ORAL at 14:11

## 2022-07-11 RX ADMIN — DIPHENHYDRAMINE HYDROCHLORIDE 25 MG: 25 CAPSULE ORAL at 14:11

## 2022-07-13 ENCOUNTER — INFUSION (OUTPATIENT)
Dept: ONCOLOGY | Facility: HOSPITAL | Age: 32
End: 2022-07-13

## 2022-07-13 VITALS
HEART RATE: 94 BPM | DIASTOLIC BLOOD PRESSURE: 84 MMHG | SYSTOLIC BLOOD PRESSURE: 164 MMHG | TEMPERATURE: 97.1 F | RESPIRATION RATE: 20 BRPM

## 2022-07-13 DIAGNOSIS — K90.9 IRON MALABSORPTION: ICD-10-CM

## 2022-07-13 DIAGNOSIS — E61.1 IRON DEFICIENCY: Primary | ICD-10-CM

## 2022-07-13 PROCEDURE — 63710000001 DIPHENHYDRAMINE PER 50 MG: Performed by: INTERNAL MEDICINE

## 2022-07-13 PROCEDURE — 25010000002 IRON SUCROSE PER 1 MG: Performed by: INTERNAL MEDICINE

## 2022-07-13 PROCEDURE — 96365 THER/PROPH/DIAG IV INF INIT: CPT | Performed by: INTERNAL MEDICINE

## 2022-07-13 RX ORDER — FAMOTIDINE 20 MG/1
20 TABLET, FILM COATED ORAL ONCE
Status: CANCELLED | OUTPATIENT
Start: 2022-07-13

## 2022-07-13 RX ORDER — ACETAMINOPHEN 325 MG/1
650 TABLET ORAL ONCE
Status: COMPLETED | OUTPATIENT
Start: 2022-07-13 | End: 2022-07-13

## 2022-07-13 RX ORDER — ACETAMINOPHEN 325 MG/1
650 TABLET ORAL ONCE
Status: CANCELLED | OUTPATIENT
Start: 2022-07-13

## 2022-07-13 RX ORDER — DIPHENHYDRAMINE HCL 25 MG
25 CAPSULE ORAL ONCE
Status: COMPLETED | OUTPATIENT
Start: 2022-07-13 | End: 2022-07-13

## 2022-07-13 RX ORDER — DIPHENHYDRAMINE HCL 25 MG
25 CAPSULE ORAL ONCE
Status: CANCELLED | OUTPATIENT
Start: 2022-07-13

## 2022-07-13 RX ORDER — SODIUM CHLORIDE 9 MG/ML
250 INJECTION, SOLUTION INTRAVENOUS ONCE
Status: COMPLETED | OUTPATIENT
Start: 2022-07-13 | End: 2022-07-13

## 2022-07-13 RX ORDER — FAMOTIDINE 20 MG/1
20 TABLET, FILM COATED ORAL ONCE
Status: COMPLETED | OUTPATIENT
Start: 2022-07-13 | End: 2022-07-13

## 2022-07-13 RX ORDER — SODIUM CHLORIDE 9 MG/ML
250 INJECTION, SOLUTION INTRAVENOUS ONCE
Status: CANCELLED | OUTPATIENT
Start: 2022-07-13

## 2022-07-13 RX ADMIN — ACETAMINOPHEN 650 MG: 325 TABLET, FILM COATED ORAL at 14:14

## 2022-07-13 RX ADMIN — FAMOTIDINE 20 MG: 20 TABLET ORAL at 14:14

## 2022-07-13 RX ADMIN — IRON SUCROSE 200 MG: 20 INJECTION, SOLUTION INTRAVENOUS at 14:44

## 2022-07-13 RX ADMIN — DIPHENHYDRAMINE HYDROCHLORIDE 25 MG: 25 CAPSULE ORAL at 14:14

## 2022-07-13 RX ADMIN — SODIUM CHLORIDE 250 ML: 9 INJECTION, SOLUTION INTRAVENOUS at 14:16

## 2022-07-23 LAB
QT INTERVAL: 354 MS
QTC INTERVAL: 470 MS

## 2022-09-23 ENCOUNTER — APPOINTMENT (OUTPATIENT)
Dept: ONCOLOGY | Facility: CLINIC | Age: 32
End: 2022-09-23

## 2022-10-11 ENCOUNTER — OFFICE VISIT (OUTPATIENT)
Dept: ONCOLOGY | Facility: CLINIC | Age: 32
End: 2022-10-11

## 2022-10-11 ENCOUNTER — LAB (OUTPATIENT)
Dept: ONCOLOGY | Facility: HOSPITAL | Age: 32
End: 2022-10-11

## 2022-10-11 VITALS
HEART RATE: 90 BPM | OXYGEN SATURATION: 98 % | WEIGHT: 266 LBS | TEMPERATURE: 97.1 F | DIASTOLIC BLOOD PRESSURE: 86 MMHG | BODY MASS INDEX: 42.93 KG/M2 | SYSTOLIC BLOOD PRESSURE: 141 MMHG

## 2022-10-11 DIAGNOSIS — D75.839 THROMBOCYTOSIS: ICD-10-CM

## 2022-10-11 DIAGNOSIS — R71.8 MICROCYTOSIS: ICD-10-CM

## 2022-10-11 DIAGNOSIS — D72.828 OTHER ELEVATED WHITE BLOOD CELL (WBC) COUNT: ICD-10-CM

## 2022-10-11 DIAGNOSIS — E61.1 IRON DEFICIENCY: ICD-10-CM

## 2022-10-11 LAB
ACANTHOCYTES BLD QL SMEAR: NORMAL
ANISOCYTOSIS BLD QL: NORMAL
BASOPHILS # BLD AUTO: 0.08 10*3/MM3 (ref 0–0.2)
BASOPHILS NFR BLD AUTO: 0.5 % (ref 0–1.5)
DEPRECATED RDW RBC AUTO: 45.5 FL (ref 37–54)
EOSINOPHIL # BLD AUTO: 0.37 10*3/MM3 (ref 0–0.4)
EOSINOPHIL NFR BLD AUTO: 2.3 % (ref 0.3–6.2)
ERYTHROCYTE [DISTWIDTH] IN BLOOD BY AUTOMATED COUNT: 15.5 % (ref 12.3–15.4)
FERRITIN SERPL-MCNC: 292.6 NG/ML (ref 13–150)
FOLATE SERPL-MCNC: >20 NG/ML (ref 4.78–24.2)
HCT VFR BLD AUTO: 44.6 % (ref 34–46.6)
HGB BLD-MCNC: 14.4 G/DL (ref 12–15.9)
IMM GRANULOCYTES # BLD AUTO: 0.14 10*3/MM3 (ref 0–0.05)
IMM GRANULOCYTES NFR BLD AUTO: 0.9 % (ref 0–0.5)
IRON 24H UR-MRATE: 49 MCG/DL (ref 37–145)
IRON SATN MFR SERPL: 14 % (ref 20–50)
LYMPHOCYTES # BLD AUTO: 4.59 10*3/MM3 (ref 0.7–3.1)
LYMPHOCYTES NFR BLD AUTO: 28.2 % (ref 19.6–45.3)
MCH RBC QN AUTO: 26.2 PG (ref 26.6–33)
MCHC RBC AUTO-ENTMCNC: 32.3 G/DL (ref 31.5–35.7)
MCV RBC AUTO: 81.2 FL (ref 79–97)
MONOCYTES # BLD AUTO: 0.83 10*3/MM3 (ref 0.1–0.9)
MONOCYTES NFR BLD AUTO: 5.1 % (ref 5–12)
NEUTROPHILS NFR BLD AUTO: 10.28 10*3/MM3 (ref 1.7–7)
NEUTROPHILS NFR BLD AUTO: 63 % (ref 42.7–76)
NRBC BLD AUTO-RTO: 0 /100 WBC (ref 0–0.2)
PLATELET # BLD AUTO: 379 10*3/MM3 (ref 140–450)
PMV BLD AUTO: 8.6 FL (ref 6–12)
RBC # BLD AUTO: 5.49 10*6/MM3 (ref 3.77–5.28)
SMALL PLATELETS BLD QL SMEAR: ADEQUATE
TIBC SERPL-MCNC: 353 MCG/DL (ref 298–536)
TRANSFERRIN SERPL-MCNC: 237 MG/DL (ref 200–360)
VIT B12 BLD-MCNC: 701 PG/ML (ref 211–946)
WBC MORPH BLD: NORMAL
WBC NRBC COR # BLD: 16.29 10*3/MM3 (ref 3.4–10.8)

## 2022-10-11 PROCEDURE — 82746 ASSAY OF FOLIC ACID SERUM: CPT

## 2022-10-11 PROCEDURE — 99214 OFFICE O/P EST MOD 30 MIN: CPT | Performed by: NURSE PRACTITIONER

## 2022-10-11 PROCEDURE — 82607 VITAMIN B-12: CPT

## 2022-10-11 PROCEDURE — G0463 HOSPITAL OUTPT CLINIC VISIT: HCPCS | Performed by: NURSE PRACTITIONER

## 2022-10-11 PROCEDURE — 84466 ASSAY OF TRANSFERRIN: CPT

## 2022-10-11 PROCEDURE — 82728 ASSAY OF FERRITIN: CPT

## 2022-10-11 PROCEDURE — 85025 COMPLETE CBC W/AUTO DIFF WBC: CPT

## 2022-10-11 PROCEDURE — 85007 BL SMEAR W/DIFF WBC COUNT: CPT

## 2022-10-11 PROCEDURE — 83540 ASSAY OF IRON: CPT

## 2022-10-11 RX ORDER — FERROUS SULFATE 325(65) MG
325 TABLET ORAL
Qty: 30 TABLET | Refills: 3 | Status: SHIPPED | OUTPATIENT
Start: 2022-10-11 | End: 2023-02-16

## 2022-10-11 RX ORDER — TOPIRAMATE 25 MG/1
1 CAPSULE, COATED PELLETS ORAL 2 TIMES DAILY
COMMUNITY
Start: 2022-09-22

## 2022-10-11 RX ORDER — FERROUS SULFATE 325(65) MG
325 TABLET ORAL 2 TIMES DAILY
Qty: 60 TABLET | Refills: 3 | Status: CANCELLED | OUTPATIENT
Start: 2022-10-11

## 2022-10-11 RX ORDER — PANTOPRAZOLE SODIUM 40 MG/1
40 TABLET, DELAYED RELEASE ORAL
COMMUNITY
Start: 2022-09-12

## 2022-10-11 RX ORDER — GABAPENTIN 300 MG/1
300 CAPSULE ORAL 3 TIMES DAILY
Qty: 90 CAPSULE | Refills: 3 | COMMUNITY
Start: 2022-09-02 | End: 2022-12-02

## 2022-10-11 RX ORDER — ZOLPIDEM TARTRATE 6.25 MG/1
TABLET, FILM COATED, EXTENDED RELEASE ORAL
COMMUNITY
Start: 2022-10-03 | End: 2023-02-16

## 2022-10-11 RX ORDER — DEXTROMETHORPHAN HYDROBROMIDE AND PROMETHAZINE HYDROCHLORIDE 15; 6.25 MG/5ML; MG/5ML
SYRUP ORAL
COMMUNITY
Start: 2022-09-07

## 2022-10-11 RX ORDER — GABAPENTIN 300 MG/1
CAPSULE ORAL
COMMUNITY
Start: 2022-09-02

## 2022-10-11 RX ORDER — TOPIRAMATE 25 MG/1
CAPSULE, COATED PELLETS ORAL
COMMUNITY
Start: 2022-09-22 | End: 2022-10-11

## 2022-10-11 RX ORDER — CYANOCOBALAMIN 1000 UG/ML
1000 INJECTION, SOLUTION INTRAMUSCULAR; SUBCUTANEOUS
COMMUNITY
Start: 2022-09-12 | End: 2022-10-11

## 2022-10-11 RX ORDER — CARIPRAZINE 1.5 MG/1
CAPSULE, GELATIN COATED ORAL
COMMUNITY
Start: 2022-09-06 | End: 2023-02-16 | Stop reason: SDUPTHER

## 2022-10-11 RX ORDER — CEFUROXIME AXETIL 500 MG/1
TABLET ORAL
COMMUNITY
Start: 2022-09-07 | End: 2023-02-16

## 2022-10-11 RX ORDER — PANTOPRAZOLE SODIUM 40 MG/1
40 TABLET, DELAYED RELEASE ORAL
COMMUNITY
Start: 2022-07-03 | End: 2022-10-11

## 2022-10-11 RX ORDER — ONDANSETRON 4 MG/1
TABLET, FILM COATED ORAL
COMMUNITY
Start: 2022-07-21 | End: 2023-02-16

## 2022-10-11 RX ORDER — CYANOCOBALAMIN 1000 UG/ML
INJECTION, SOLUTION INTRAMUSCULAR; SUBCUTANEOUS
COMMUNITY
Start: 2022-09-12 | End: 2022-10-11

## 2022-10-11 NOTE — TELEPHONE ENCOUNTER
Rx Refill Note  Requested Prescriptions     Pending Prescriptions Disp Refills   • ferrous sulfate 325 (65 FE) MG tablet 60 tablet 3     Sig: Take 1 tablet by mouth 2 (Two) Times a Day.      Last office visit with prescribing clinician: 10/11/2022      Next office visit with prescribing clinician: Visit date not found            Edilma Guzman  10/11/22, 15:00 CDT

## 2022-10-17 NOTE — PROGRESS NOTES
DATE OF VISIT: 10/11/2022      REASON FOR VISIT:  Leukocytosis, microcytosis due to iron deficiency, thrombocytosis        HISTORY OF PRESENT ILLNESS:   32-year-old female with medical problem consisting of anxiety/depression, migraine, seasonal allergies, irregular and heavy menstrual bleeding since menarche was initially seen in consultation on December 14, 2021 for leukocytosis, iron deficiency, microcytosis and thrombocytosis.  Patient is here for follow-up appointment today.  She received 5 doses of IV iron in July and here today to reassess her iron studies. Denies any bleeding except for menstrual bleeding.  Denies any new lymph node enlargement.  Complains of arthralgias affecting upper extremity.  Denies any new chest pain or shortness of breath              Past Medical History, Past Surgical History, Social History, Family History have been reviewed and are without significant changes except as mentioned.    Review of Systems   A comprehensive 14 point review of systems was performed and was negative except as mentioned.    Medications:  The current medication list was reviewed in the EMR    ALLERGIES:    Allergies   Allergen Reactions   • Flagyl [Metronidazole] Hives   • Nabumetone Nausea Only   • Penicillins Hives   • Sulfa Antibiotics Hives   • Amiodarone Rash   • Latex Rash       Objective      Vitals:    10/11/22 1453   BP: 141/86   Pulse: 90   Temp: 97.1 °F (36.2 °C)   TempSrc: Temporal   SpO2: 98%   Weight: 121 kg (266 lb)   PainSc:   7   PainLoc: Generalized  Comment: neck, fingers, knee,     No flowsheet data found.    Physical Exam  General: alert and oriented no acute distress  Lungs; normal breath sounds  Card: RRR  Ext: no edema    RECENT LABS:  Glucose   Date Value Ref Range Status   06/30/2022 100 (H) 65 - 99 mg/dL Final     Sodium   Date Value Ref Range Status   06/30/2022 139 136 - 145 mmol/L Final   05/28/2022 137 136 - 145 mmol/L Final     Potassium   Date Value Ref Range Status    06/30/2022 3.9 3.5 - 5.2 mmol/L Final   05/28/2022 3.9 3.5 - 5.1 mmol/L Final     CO2   Date Value Ref Range Status   06/30/2022 23.0 22.0 - 29.0 mmol/L Final     Total CO2   Date Value Ref Range Status   05/28/2022 24 21 - 31 mmol/L Final     Chloride   Date Value Ref Range Status   06/30/2022 107 98 - 107 mmol/L Final   05/28/2022 104 98 - 107 mmol/L Final     Anion Gap   Date Value Ref Range Status   06/30/2022 9.0 5.0 - 15.0 mmol/L Final     Creatinine   Date Value Ref Range Status   06/30/2022 0.58 0.57 - 1.00 mg/dL Final   05/28/2022 0.5 (L) 0.7 - 1.3 mg/dL Final     BUN   Date Value Ref Range Status   06/30/2022 10 6 - 20 mg/dL Final   05/28/2022 9 7 - 25 mg/dL Final     BUN/Creatinine Ratio   Date Value Ref Range Status   06/30/2022 17.2 7.0 - 25.0 Final     Calcium   Date Value Ref Range Status   06/30/2022 8.7 8.6 - 10.5 mg/dL Final   05/28/2022 8.6 8.6 - 10.3 mg/dL Final     eGFR Non  Amer   Date Value Ref Range Status   10/23/2021 110 >60 mL/min/1.73 Final     Alkaline Phosphatase   Date Value Ref Range Status   06/30/2022 95 39 - 117 U/L Final   05/28/2022 84 34 - 104 U/L Final     Total Protein   Date Value Ref Range Status   06/30/2022 6.8 6.0 - 8.5 g/dL Final     ALT (SGPT)   Date Value Ref Range Status   06/30/2022 20 1 - 33 U/L Final   05/28/2022 15 7 - 52 U/L Final     AST (SGOT)   Date Value Ref Range Status   06/30/2022 14 1 - 32 U/L Final   05/28/2022 17 13 - 39 U/L Final     Total Bilirubin   Date Value Ref Range Status   06/30/2022 0.2 0.0 - 1.2 mg/dL Final   05/28/2022 0.32 0.3 - 1.0 mg/dL Final     Albumin   Date Value Ref Range Status   06/30/2022 3.70 3.50 - 5.20 g/dL Final   05/28/2022 3.9 3.5 - 5.7 g/dL Final     Globulin   Date Value Ref Range Status   06/30/2022 3.1 gm/dL Final     Lab Results   Component Value Date    WBC 16.29 (H) 10/11/2022    HGB 14.4 10/11/2022    HCT 44.6 10/11/2022    MCV 81.2 10/11/2022     10/11/2022     Lab Results   Component Value Date     NEUTROABS 10.28 (H) 10/11/2022    IRON 49 10/11/2022    IRON 49 06/23/2022    IRON 41 05/28/2022    TIBC 353 10/11/2022    TIBC 408 06/23/2022    TIBC 409 05/28/2022    LABIRON 14 (L) 10/11/2022    LABIRON 12 (L) 06/23/2022    LABIRON 10 (L) 05/28/2022    FERRITIN 292.60 (H) 10/11/2022    FERRITIN 65.95 06/23/2022    FERRITIN 36 05/28/2022    [id number] 701 10/11/2022    [id number] 809 06/23/2022    [id number] 282 05/28/2022    FOLATE >20.00 10/11/2022    FOLATE 16.00 06/23/2022    FOLATE 21.60 06/22/2022     No results found for: , LABCA2, AFPTM, HCGQUANT, , CHROMGRNA, [id number], CEA, REFLABREPO            RADIOLOGY DATA :  No radiology results for the last 7 days        Assessment & Plan     1.  Leukocytosis:  - Patient has been having ongoing leukocytosis with white blood cell count ranging between 12,000 and 19,000.  - CBC done on June 23, 2022 shows white blood cell count is 16,000 which is again predominantly increase in neutrophils.  - C-reactive protein in the past has been elevated at 1.33 consistent with inflammatory cause; she states her PCP has referred her to rheumatology  - Peripheral blood flow cytometry on December 14, 2021 was negative for any leukemia or lymphoma.  - Recommend continuing with clinical monitoring for now  - We will have patient return to clinic in 3 months with repeat CBC, iron studies, ferritin, B12 and folate to be done prior to that     2.  Iron deficiency anemia with microcytosis  - She received 5 doses of IV iron in July.  -labs reviewed today show Hgb is 14.4 with iron saturation of 14% and ferritin in 292; would not give any more IV iron with ferritin of 292; she does continue on oral tablet one daily and continue with B-12 daily.  -Repeat labs with office visit and CBC, Iron profile, ferritin, B-12 and folate.     3.  Thrombocytosis:  - Reactive due to iron deficiency:  - Platelet count is improved to 379,000.  Will monitor with CBC                  PHQ-9 Total  Score: 2 pt is not suicidal or homicidal    Sanjuana Allison reports a pain score of 7.  Given her pain assessment as noted, treatment options were discussed and the following options were decided upon as a follow-up plan to address the patient's pain: continuation of current treatment plan for pain.         Linda Begum, APRN  10/17/2022  12:19 CDT        Part of this note may be an electronic transcription/translation of spoken language to printed text using the Dragon Dictation System.          CC:

## 2023-01-10 ENCOUNTER — OFFICE VISIT (OUTPATIENT)
Dept: ONCOLOGY | Facility: CLINIC | Age: 33
End: 2023-01-10
Payer: COMMERCIAL

## 2023-01-10 ENCOUNTER — LAB (OUTPATIENT)
Dept: ONCOLOGY | Facility: HOSPITAL | Age: 33
End: 2023-01-10
Payer: COMMERCIAL

## 2023-01-10 VITALS
BODY MASS INDEX: 43.09 KG/M2 | OXYGEN SATURATION: 97 % | SYSTOLIC BLOOD PRESSURE: 150 MMHG | DIASTOLIC BLOOD PRESSURE: 107 MMHG | TEMPERATURE: 97.6 F | HEART RATE: 102 BPM | WEIGHT: 267 LBS

## 2023-01-10 DIAGNOSIS — E61.1 IRON DEFICIENCY: ICD-10-CM

## 2023-01-10 DIAGNOSIS — D72.828 OTHER ELEVATED WHITE BLOOD CELL (WBC) COUNT: Primary | Chronic | ICD-10-CM

## 2023-01-10 DIAGNOSIS — E61.1 IRON DEFICIENCY: Chronic | ICD-10-CM

## 2023-01-10 DIAGNOSIS — K90.9 IRON MALABSORPTION: Chronic | ICD-10-CM

## 2023-01-10 DIAGNOSIS — R71.8 MICROCYTOSIS: Chronic | ICD-10-CM

## 2023-01-10 DIAGNOSIS — D75.839 THROMBOCYTOSIS: Chronic | ICD-10-CM

## 2023-01-10 LAB
BASOPHILS # BLD AUTO: 0.07 10*3/MM3 (ref 0–0.2)
BASOPHILS NFR BLD AUTO: 0.4 % (ref 0–1.5)
DEPRECATED RDW RBC AUTO: 42.1 FL (ref 37–54)
EOSINOPHIL # BLD AUTO: 0.41 10*3/MM3 (ref 0–0.4)
EOSINOPHIL NFR BLD AUTO: 2.5 % (ref 0.3–6.2)
ERYTHROCYTE [DISTWIDTH] IN BLOOD BY AUTOMATED COUNT: 14.4 % (ref 12.3–15.4)
FERRITIN SERPL-MCNC: 340.9 NG/ML (ref 13–150)
FOLATE SERPL-MCNC: 13.5 NG/ML (ref 4.78–24.2)
HCT VFR BLD AUTO: 42.4 % (ref 34–46.6)
HGB BLD-MCNC: 13.3 G/DL (ref 12–15.9)
IMM GRANULOCYTES # BLD AUTO: 0.08 10*3/MM3 (ref 0–0.05)
IMM GRANULOCYTES NFR BLD AUTO: 0.5 % (ref 0–0.5)
IRON 24H UR-MRATE: 42 MCG/DL (ref 37–145)
IRON SATN MFR SERPL: 13 % (ref 20–50)
LYMPHOCYTES # BLD AUTO: 3.37 10*3/MM3 (ref 0.7–3.1)
LYMPHOCYTES NFR BLD AUTO: 20.4 % (ref 19.6–45.3)
MCH RBC QN AUTO: 25.7 PG (ref 26.6–33)
MCHC RBC AUTO-ENTMCNC: 31.4 G/DL (ref 31.5–35.7)
MCV RBC AUTO: 82 FL (ref 79–97)
MONOCYTES # BLD AUTO: 0.81 10*3/MM3 (ref 0.1–0.9)
MONOCYTES NFR BLD AUTO: 4.9 % (ref 5–12)
NEUTROPHILS NFR BLD AUTO: 11.79 10*3/MM3 (ref 1.7–7)
NEUTROPHILS NFR BLD AUTO: 71.3 % (ref 42.7–76)
NRBC BLD AUTO-RTO: 0 /100 WBC (ref 0–0.2)
PLATELET # BLD AUTO: 434 10*3/MM3 (ref 140–450)
PMV BLD AUTO: 8.3 FL (ref 6–12)
RBC # BLD AUTO: 5.17 10*6/MM3 (ref 3.77–5.28)
TIBC SERPL-MCNC: 334 MCG/DL (ref 298–536)
TRANSFERRIN SERPL-MCNC: 224 MG/DL (ref 200–360)
VIT B12 BLD-MCNC: 747 PG/ML (ref 211–946)
WBC NRBC COR # BLD: 16.53 10*3/MM3 (ref 3.4–10.8)

## 2023-01-10 PROCEDURE — 82728 ASSAY OF FERRITIN: CPT

## 2023-01-10 PROCEDURE — 82607 VITAMIN B-12: CPT

## 2023-01-10 PROCEDURE — 99214 OFFICE O/P EST MOD 30 MIN: CPT | Performed by: INTERNAL MEDICINE

## 2023-01-10 PROCEDURE — G0463 HOSPITAL OUTPT CLINIC VISIT: HCPCS | Performed by: INTERNAL MEDICINE

## 2023-01-10 PROCEDURE — 83540 ASSAY OF IRON: CPT

## 2023-01-10 PROCEDURE — 82746 ASSAY OF FOLIC ACID SERUM: CPT

## 2023-01-10 PROCEDURE — 84466 ASSAY OF TRANSFERRIN: CPT

## 2023-01-10 PROCEDURE — 85025 COMPLETE CBC W/AUTO DIFF WBC: CPT

## 2023-01-10 RX ORDER — CYANOCOBALAMIN 1000 UG/ML
INJECTION, SOLUTION INTRAMUSCULAR; SUBCUTANEOUS
COMMUNITY
Start: 2022-12-11 | End: 2023-01-10

## 2023-01-10 RX ORDER — BENZONATATE 200 MG/1
CAPSULE ORAL
COMMUNITY
Start: 2022-10-28

## 2023-01-10 NOTE — PROGRESS NOTES
DATE OF VISIT: 1/11/2023      REASON FOR VISIT: Leukocytosis, microcytosis due to iron deficiency, thrombocytosis      HISTORY OF PRESENT ILLNESS:   32-year-old female with medical problem consisting of anxiety/depression, migraine, seasonal allergies, irregular and heavy menstrual bleeding since menarche was initially seen in consultation on December 14, 2021 for leukocytosis, iron deficiency, microcytosis and thrombocytosis.  Patient is here for follow-up appointment today.  Denies any bleeding except for menstrual bleeding.  Denies any new lymph node enlargement.  Complains of arthralgia.  Denies any new chest pain or shortness of breath.              Past Medical History, Past Surgical History, Social History, Family History have been reviewed and are without significant changes except as mentioned.    Review of Systems   A comprehensive 14 point review of systems was performed and was negative except as mentioned in HPI.    Medications:  The current medication list was reviewed in the EMR    ALLERGIES:    Allergies   Allergen Reactions   • Flagyl [Metronidazole] Hives   • Nabumetone Nausea Only   • Penicillins Hives   • Sulfa Antibiotics Hives   • Amiodarone Rash   • Latex Rash       Objective      Vitals:    01/10/23 1322   BP: (!) 150/107   Pulse: 102   Temp: 97.6 °F (36.4 °C)   TempSrc: Temporal   SpO2: 97%   Weight: 121 kg (267 lb)   PainSc:   4   PainLoc: Elbow  Comment: BILATERAL ELBOW PAIN     No flowsheet data found.    Physical Exam  Pulmonary:      Breath sounds: Normal breath sounds.   Neurological:      Mental Status: She is alert and oriented to person, place, and time.           RECENT LABS:  Glucose   Date Value Ref Range Status   06/30/2022 100 (H) 65 - 99 mg/dL Final     Sodium   Date Value Ref Range Status   06/30/2022 139 136 - 145 mmol/L Final   05/28/2022 137 136 - 145 mmol/L Final     Potassium   Date Value Ref Range Status   06/30/2022 3.9 3.5 - 5.2 mmol/L Final   05/28/2022 3.9 3.5 - 5.1  mmol/L Final     CO2   Date Value Ref Range Status   06/30/2022 23.0 22.0 - 29.0 mmol/L Final     Total CO2   Date Value Ref Range Status   05/28/2022 24 21 - 31 mmol/L Final     Chloride   Date Value Ref Range Status   06/30/2022 107 98 - 107 mmol/L Final   05/28/2022 104 98 - 107 mmol/L Final     Anion Gap   Date Value Ref Range Status   06/30/2022 9.0 5.0 - 15.0 mmol/L Final     Creatinine   Date Value Ref Range Status   06/30/2022 0.58 0.57 - 1.00 mg/dL Final   05/28/2022 0.5 (L) 0.7 - 1.3 mg/dL Final     BUN   Date Value Ref Range Status   06/30/2022 10 6 - 20 mg/dL Final   05/28/2022 9 7 - 25 mg/dL Final     BUN/Creatinine Ratio   Date Value Ref Range Status   06/30/2022 17.2 7.0 - 25.0 Final     Calcium   Date Value Ref Range Status   06/30/2022 8.7 8.6 - 10.5 mg/dL Final   05/28/2022 8.6 8.6 - 10.3 mg/dL Final     eGFR Non  Amer   Date Value Ref Range Status   10/23/2021 110 >60 mL/min/1.73 Final     Alkaline Phosphatase   Date Value Ref Range Status   06/30/2022 95 39 - 117 U/L Final   05/28/2022 84 34 - 104 U/L Final     Total Protein   Date Value Ref Range Status   06/30/2022 6.8 6.0 - 8.5 g/dL Final     ALT (SGPT)   Date Value Ref Range Status   06/30/2022 20 1 - 33 U/L Final   05/28/2022 15 7 - 52 U/L Final     AST (SGOT)   Date Value Ref Range Status   06/30/2022 14 1 - 32 U/L Final   05/28/2022 17 13 - 39 U/L Final     Total Bilirubin   Date Value Ref Range Status   06/30/2022 0.2 0.0 - 1.2 mg/dL Final   05/28/2022 0.32 0.3 - 1.0 mg/dL Final     Albumin   Date Value Ref Range Status   06/30/2022 3.70 3.50 - 5.20 g/dL Final   05/28/2022 3.9 3.5 - 5.7 g/dL Final     Globulin   Date Value Ref Range Status   06/30/2022 3.1 gm/dL Final     Lab Results   Component Value Date    WBC 16.53 (H) 01/10/2023    HGB 13.3 01/10/2023    HCT 42.4 01/10/2023    MCV 82.0 01/10/2023     01/10/2023     Lab Results   Component Value Date    NEUTROABS 11.79 (H) 01/10/2023    IRON 42 01/10/2023    IRON 49  10/11/2022    IRON 49 06/23/2022    TIBC 334 01/10/2023    TIBC 353 10/11/2022    TIBC 408 06/23/2022    LABIRON 13 (L) 01/10/2023    LABIRON 14 (L) 10/11/2022    LABIRON 12 (L) 06/23/2022    FERRITIN 340.90 (H) 01/10/2023    FERRITIN 292.60 (H) 10/11/2022    FERRITIN 65.95 06/23/2022    [id number] 747 01/10/2023    LRGFXSXQ60 701 10/11/2022    ILVUQMAN94 809 06/23/2022    FOLATE 13.50 01/10/2023    FOLATE >20.00 10/11/2022    FOLATE 16.00 06/23/2022     No results found for: , LABCA2, AFPTM, HCGQUANT, , CHROMGRNA, [id number], CEA, REFLABREPO      PATHOLOGY:  * Cannot find OR log *         RADIOLOGY DATA :  No radiology results for the last 7 days        Assessment & Plan     1.  Leukocytosis:  - Patient has been having ongoing leukocytosis with white blood cell count ranging between 12,000 and 19,000  - C-reactive protein in the past has been elevated at 1.33 consistent with inflammatory course  - Peripheral blood flow cytometry on December 14, 2021 was negative for any leukemia or lymphoma  - CBC done today shows white blood cell count is 16,000 which is again predominantly increase in neutrophils  - We will continue with clinical monitoring  - Patient will return to clinic in 3 months with repeat CBC, iron studies, ferritin, B12 and folate to be done on that day    2.  Iron deficiency anemia with microcytosis  - Due to iron malabsorption patient has received intravenous Venofer in July 2022.  - Currently on B12 p.o. daily  - Anemia work-up done today shows hemoglobin is 13.3.  Iron saturation is 13% with ferritin of 340.  No need for any intravenous iron replacement at present    3.  Thrombocytosis:  - Reactive due to iron deficiency  - Platelet count is 434,000  - We will monitor with CBC    4.  Hypertension:  - Blood pressure is elevated at 150/107  - Patient states her blood pressure has been staying high at home recently.  She is scheduled to see primary medical provider later today for further  recommendation for hypertension.    5.  Health maintenance: Patient does not smoke                     PHQ-9 Total Score: 2   -Patient is not homicidal or suicidal.  No acute intervention required.    Sanjuana Allison reports a pain score of 4.  Given her pain assessment as noted, treatment options were discussed and the following options were decided upon as a follow-up plan to address the patient's pain: continuation of current treatment plan for pain.         Ankit Espinoza MD  1/11/2023  07:18 CST        Part of this note may be an electronic transcription/translation of spoken language to printed text using the Dragon Dictation System.          CC:

## 2023-01-11 ENCOUNTER — TELEPHONE (OUTPATIENT)
Dept: ONCOLOGY | Facility: CLINIC | Age: 33
End: 2023-01-11
Payer: COMMERCIAL

## 2023-01-11 NOTE — TELEPHONE ENCOUNTER
----- Message from Ankit Espinoza MD sent at 1/11/2023  7:17 AM CST -----  Regarding: Lab results  Please let patient know, her iron level is adequate.  No need for any intravenous iron replacement at present.  Recommend continuing with B12 p.o. daily.  Thank you

## 2023-02-16 ENCOUNTER — TELEMEDICINE (OUTPATIENT)
Dept: FAMILY MEDICINE CLINIC | Facility: TELEHEALTH | Age: 33
End: 2023-02-16
Payer: COMMERCIAL

## 2023-02-16 VITALS — WEIGHT: 263 LBS | HEIGHT: 66 IN | BODY MASS INDEX: 42.27 KG/M2

## 2023-02-16 DIAGNOSIS — U07.1 COVID-19: Primary | ICD-10-CM

## 2023-02-16 PROCEDURE — 99213 OFFICE O/P EST LOW 20 MIN: CPT | Performed by: NURSE PRACTITIONER

## 2023-02-16 RX ORDER — ESZOPICLONE 2 MG/1
2 TABLET, FILM COATED ORAL DAILY
COMMUNITY
Start: 2023-02-07 | End: 2023-05-09

## 2023-02-16 RX ORDER — PROPRANOLOL HYDROCHLORIDE 10 MG/1
TABLET ORAL
COMMUNITY
Start: 2023-02-06 | End: 2023-02-16 | Stop reason: SDUPTHER

## 2023-02-16 RX ORDER — PROPRANOLOL HYDROCHLORIDE 10 MG/1
10 TABLET ORAL 3 TIMES DAILY
COMMUNITY
Start: 2023-01-10 | End: 2024-01-11

## 2023-02-16 RX ORDER — ESZOPICLONE 2 MG/1
TABLET, FILM COATED ORAL
COMMUNITY
Start: 2023-02-07 | End: 2023-02-16 | Stop reason: SDUPTHER

## 2023-02-16 RX ORDER — CARIPRAZINE 3 MG/1
CAPSULE, GELATIN COATED ORAL
COMMUNITY
Start: 2023-01-10 | End: 2023-02-16 | Stop reason: SDUPTHER

## 2023-02-16 NOTE — PROGRESS NOTES
"CHIEF COMPLAINT  No chief complaint on file.        \[facility] [place] [facility]\""  Sanjuana Allison is a 32 y.o. female  presents with complaint of    Review of Systems   Constitutional: Positive for chills, diaphoresis and fatigue. Negative for fever.   HENT: Positive for congestion, postnasal drip, rhinorrhea, sinus pressure and sore throat. Negative for sneezing.    Respiratory: Positive for cough, chest tightness and shortness of breath (mild ). Negative for wheezing.    Cardiovascular: Positive for chest pain (from coughing ).   Gastrointestinal: Negative for diarrhea, nausea and vomiting.   Musculoskeletal: Positive for myalgias.   Neurological: Negative for headaches.       Past Medical History:   Diagnosis Date   • Acid reflux    • Allergic    • Anxiety    • Depression    • Gallbladder abscess    • Migraines    • Sinusitis    • Stomach ulcer        Family History   Problem Relation Age of Onset   • Arthritis Mother    • Diabetes Mother    • Cancer Mother         breat cancer   • Hypertension Mother    • Hypertension Father    • Hypertension Maternal Grandmother    • Heart disease Maternal Grandfather    • Hypertension Maternal Grandfather        Social History     Socioeconomic History   • Marital status:    Tobacco Use   • Smoking status: Never   • Smokeless tobacco: Never   Substance and Sexual Activity   • Alcohol use: No   • Drug use: No   • Sexual activity: Defer     Birth control/protection: Injection       Sanjuana Allison  reports that she has never smoked. She has never used smokeless tobacco..      Ht 166.6 cm (65.6\")   Wt 119 kg (263 lb)   BMI 42.97 kg/m²     PHYSICAL EXAM  Physical Exam   Constitutional: She is oriented to person, place, and time. She appears well-developed and well-nourished. She has a sickly appearance. She does not appear ill. No distress. She appears obese.  HENT:   Head: Normocephalic and atraumatic.   Eyes: EOM are normal.   Neck: Neck normal appearance.  Pulmonary/Chest: " Effort normal.  No respiratory distress.  Neurological: She is alert and oriented to person, place, and time.   Skin: Skin is dry.   Psychiatric: She has a normal mood and affect.       Results for orders placed or performed in visit on 01/10/23   Ferritin    Specimen: Arm, Left; Blood   Result Value Ref Range    Ferritin 340.90 (H) 13.00 - 150.00 ng/mL   Iron Profile    Specimen: Arm, Left; Blood   Result Value Ref Range    Iron 42 37 - 145 mcg/dL    Iron Saturation 13 (L) 20 - 50 %    Transferrin 224 200 - 360 mg/dL    TIBC 334 298 - 536 mcg/dL   Vitamin B12 & Folate    Specimen: Arm, Left; Blood   Result Value Ref Range    Folate 13.50 4.78 - 24.20 ng/mL    Vitamin B-12 747 211 - 946 pg/mL   CBC Auto Differential    Specimen: Arm, Left; Blood   Result Value Ref Range    WBC 16.53 (H) 3.40 - 10.80 10*3/mm3    RBC 5.17 3.77 - 5.28 10*6/mm3    Hemoglobin 13.3 12.0 - 15.9 g/dL    Hematocrit 42.4 34.0 - 46.6 %    MCV 82.0 79.0 - 97.0 fL    MCH 25.7 (L) 26.6 - 33.0 pg    MCHC 31.4 (L) 31.5 - 35.7 g/dL    RDW 14.4 12.3 - 15.4 %    RDW-SD 42.1 37.0 - 54.0 fl    MPV 8.3 6.0 - 12.0 fL    Platelets 434 140 - 450 10*3/mm3    Neutrophil % 71.3 42.7 - 76.0 %    Lymphocyte % 20.4 19.6 - 45.3 %    Monocyte % 4.9 (L) 5.0 - 12.0 %    Eosinophil % 2.5 0.3 - 6.2 %    Basophil % 0.4 0.0 - 1.5 %    Immature Grans % 0.5 0.0 - 0.5 %    Neutrophils, Absolute 11.79 (H) 1.70 - 7.00 10*3/mm3    Lymphocytes, Absolute 3.37 (H) 0.70 - 3.10 10*3/mm3    Monocytes, Absolute 0.81 0.10 - 0.90 10*3/mm3    Eosinophils, Absolute 0.41 (H) 0.00 - 0.40 10*3/mm3    Basophils, Absolute 0.07 0.00 - 0.20 10*3/mm3    Immature Grans, Absolute 0.08 (H) 0.00 - 0.05 10*3/mm3    nRBC 0.0 0.0 - 0.2 /100 WBC       Diagnoses and all orders for this visit:    1. COVID-19 (Primary)  -     QUESTIONNAIRE SERIES    Other orders  -     Nirmatrelvir&Ritonavir 300/100 (PAXLOVID) 20 x 150 MG & 10 x 100MG tablet therapy pack tablet; Take 3 tablets by mouth 2 (Two) Times a Day  for 5 days.  Dispense: 30 tablet; Refill: 0    6/30/2022 eGFR > 60 ml/min/1.73  BMI 42.97   Recommend going back to Vraylar 1.5 mg since she still has this and to decrease Lunesta dose by 1/2. She will discuss with her pcp.   Discussed treatment options including treating symptoms only and treating symptoms along with treatment with the antiviral, Paxlovid.   There have been reports of rebound symptoms with Paxlovid. These are COVID symptoms that return several days after the last dose of Paxlovid      The use of a video visit has been reviewed with the patient and verbal informed consent has been obtained. Myself and Sanjuana Allison participated in this visit. The patient is located in [address] [place] [address]. I am located in [place], [place]. Mychart and Twilio were utilized.       Note Disclaimer: At [facility], we believe that sharing information builds trust and better   relationships. You are receiving this note because you recently visited [facility]. It is possible you   will see health information before a provider has talked with you about it. This kind of information can   be easy to misunderstand. To help you fully understand what it means for your health, we urge you to   discuss this note with your provider.    Mame Corbett, MARIA GUADALUPE  02/16/2023  08:07 EST

## 2023-02-16 NOTE — PATIENT INSTRUCTIONS
Recommend going back to Vraylar 1.5 mg since she still has this and to decrease Lunesta dose by 1/2. She will discuss with her pcp.   Discussed treatment options including treating symptoms only and treating symptoms along with treatment with the antiviral, Paxlovid.   There have been reports of rebound symptoms with Paxlovid. These are COVID symptoms that return several days after the last dose of Paxlovid    You will need to remain quarantined for 10 days from onset of symptoms and only to discontinue if symptoms have improved and no fever for 24 hours without the use of fever reducing medications such as Tylenol (acetaminophen), Advil (ibuprfen), or Aleve (naproxen).     You may discontinue after 5 days if your symptoms have resolved and you have no fever for 24 hours without the use of fever reducing medications such as Tylenol (acetaminophen), Advil (ibuprfen), or Aleve (naproxen).   Continue to wear a mask for 10 days or until symptoms resolve. If symptoms worsen, go to Urgent Care or the Emergency Department for care.     Symptoms of Coronavirus are treated just as you would for any viral illness. Take Tylenol and/or Ibuprofen for pain and/or fever, you may find it better to alternate these every 4 hours, so that you are only taking each every 8 hours. Stay hydrated, rest and you may treat cough with over-the-counter cough syrup such as Robitussin.  If your symptoms do not improve, symptoms change or do not fully resolve, seek further evaluation with your primary care provider or Urgent Care.     If you develop severe symptoms, such as chest pain, high fever, difficulty breathing, difficulty urinating, confusion, difficulty staying awake, blue or pale lips or have any symptoms that interfere with your ability to function go to the nearest Emergency Department immediately.      FACT SHEET FOR PATIENTS, PARENTS, AND CAREGIVERS  EMERGENCY USE AUTHORIZATION (EUA) OF PAXLOVID  FOR CORONAVIRUS DISEASE 2019  (COVID-19)  You are being given this Fact Sheet because your healthcare provider believes it is   necessary to provide you with PAXLOVID for the treatment of mild-to-moderate   coronavirus disease (COVID-19) caused by the SARS-CoV-2 virus. This Fact Sheet   contains information to help you understand the risks and benefits of taking the   PAXLOVID you have received or may receive.   The [place]S. Food and Drug Administration (FDA) has issued an Emergency Use   Authorization (EUA) to make PAXLOVID available during the COVID-19 pandemic (for   more details about an EUA please see “What is an Emergency Use Authorization?”   at the end of this document). PAXLOVID is not an FDA-approved medicine in the   [place]. Read this Fact Sheet for information about PAXLOVID. Talk to your   healthcare provider about your options or if you have any questions. It is your choice to   take PAXLOVID.  What is COVID-19?  COVID-19 is caused by a virus called a coronavirus. You can get COVID-19 through   close contact with another person who has the virus.   COVID-19 illnesses have ranged from very mild-to-severe, including illness resulting in   death. While information so far suggests that most COVID-19 illness is mild, serious   illness can happen and may cause some of your other medical conditions to become   worse. Older people and people of all ages with severe, long lasting (chronic) medical   conditions like heart disease, lung disease, and diabetes, for example seem to be at   higher risk of being hospitalized for COVID-19.   What is PAXLOVID?  PAXLOVID is an investigational medicine used to treat mild-to-moderate COVID-19 in   adults and children [12 years of age and older weighing at least 88 pounds (40 kg)] with   positive results of direct SARS-CoV-2 viral testing, and who are at high risk for   progression to severe COVID-19, including hospitalization or death. PAXLOVID is   investigational because it is still being studied.  There is limited information about the   safety and effectiveness of using PAXLOVID to treat people with mild-to-moderate   COVID-19.  The FDA has authorized the emergency use of PAXLOVID for the treatment of mild-tomoderate COVID-19 in adults and children [12 years of age and older weighing at least   88 pounds (40 kg)] with a positive test for the virus that causes COVID-19, and who are   at high risk for progression to severe COVID-19, including hospitalization or death,   under an EUA.   2 Revised: 18 March 2022  What should I tell my healthcare provider before I take PAXLOVID?  Tell your healthcare provider if you:  ? Have any allergies  ? Have liver or kidney disease   ? Are pregnant or plan to become pregnant  ? Are breastfeeding a child  ? Have any serious illnesses  Tell your healthcare provider about all the medicines you take, including   prescription and over-the-counter medicines, vitamins, and herbal supplements.   Some medicines may interact with PAXLOVID and may cause serious side effects.   Keep a list of your medicines to show your healthcare provider and pharmacist when   you get a new medicine.  You can ask your healthcare provider or pharmacist for a list of medicines that interact   with PAXLOVID. Do not start taking a new medicine without telling your   healthcare provider. Your healthcare provider can tell you if it is safe to take   PAXLOVID with other medicines.   Tell your healthcare provider if you are taking combined hormonal contraceptive.  PAXLOVID may affect how your birth control pills work. Females who are able to   become pregnant should use another effective alternative form of contraception or an   additional barrier method of contraception. Talk to your healthcare provider if you have   any questions about contraceptive methods that might be right for you.  How do I take PAXLOVID?  ? PAXLOVID consists of 2 medicines: nirmatrelvir and ritonavir.   o Take 2 pink tablets of  nirmatrelvir with 1 white tablet of ritonavir by mouth   2 times each day (in the morning and in the evening) for 5 days. For each   dose, take all 3 tablets at the same time.  o If you have kidney disease, talk to your healthcare provider. You   may need a different dose.  ? Swallow the tablets whole. Do not chew, break, or crush the tablets.  ? Take PAXLOVID with or without food.   ? Do not stop taking PAXLOVID without talking to your healthcare provider, even if   you feel better.  ? If you miss a dose of PAXLOVID within 8 hours of the time it is usually taken,   take it as soon as you remember. If you miss a dose by more than 8 hours, skip   the missed dose and take the next dose at your regular time. Do not take   2 doses of PAXLOVID at the same time.   ? If you take too much PAXLOVID, call your healthcare provider or go to the   nearest hospital emergency room right away.  ? If you are taking a ritonavir- or cobicistat-containing medicine to treat hepatitis C   or Human Immunodeficiency Virus (HIV), you should continue to take your   medicine as prescribed by your healthcare provider.   3 Revised: 18 March 2022  Talk to your healthcare provider if you do not feel better or if you feel worse after   5 days.  Who should generally not take PAXLOVID?  Do not take PAXLOVID if:  ? You are allergic to nirmatrelvir, ritonavir, or any of the ingredients in PAXLOVID.  ? You are taking any of the following medicines:  o Alfuzosin  o Pethidine, propoxyphene  o Ranolazine  o Amiodarone, dronedarone, flecainide, propafenone, quinidine  o Colchicine  o Lurasidone, pimozide, clozapine  o Dihydroergotamine, ergotamine, methylergonovine  o Lovastatin, simvastatin  o Sildenafil (Revatio®) for pulmonary arterial hypertension (PAH)  o Triazolam, oral midazolam  o Apalutamide  o Carbamazepine, phenobarbital, phenytoin  o Rifampin  o Lefty’s Wort (hypericum perforatum)  Taking PAXLOVID with these medicines may cause serious or  life-threatening side   effects or affect how PAXLOVID works.  These are not the only medicines that may cause serious side effects if taken with   PAXLOVID. PAXLOVID may increase or decrease the levels of multiple other   medicines. It is very important to tell your healthcare provider about all of the medicines   you are taking because additional laboratory tests or changes in the dose of your other   medicines may be necessary while you are taking PAXLOVID. Your healthcare provider   may also tell you about specific symptoms to watch out for that may indicate that you   need to stop or decrease the dose of some of your other medicines.  What are the important possible side effects of PAXLOVID?  Possible side effects of PAXLOVID are:  ? Allergic Reactions. Allergic reactions can happen in people taking   PAXLOVID, even after only 1 dose. Stop taking PAXLOVID and call your   healthcare provider right away if you get any of the following symptoms of an   allergic reaction:  o hives  o trouble swallowing or breathing  o swelling of the mouth, lips, or face  o throat tightness  o hoarseness  4 Revised: 18 March 2022  o skin rash  ? Liver Problems. Tell your healthcare provider right away if you have any of   these signs and symptoms of liver problems: loss of appetite, yellowing of your   skin and the whites of eyes (jaundice), dark-colored urine, pale colored stools   and itchy skin, stomach area (abdominal) pain.  ? Resistance to HIV Medicines. If you have untreated HIV infection, PAXLOVID   may lead to some HIV medicines not working as well in the future.  ? Other possible side effects include:   o altered sense of taste   o diarrhea   o high blood pressure   o muscle aches  These are not all the possible side effects of PAXLOVID. Not many people have taken  PAXLOVID. Serious and unexpected side effects may happen. PAXLOVID is still being   studied, so it is possible that all of the risks are not known at this  time.  What other treatment choices are there?  Veklury (remdesivir) is FDA-approved for the treatment of mild-to-moderate COVID-19   in certain adults and children. Talk with your doctor to see if Veklury is appropriate for   you.  Like PAXLOVID, FDA may also allow for the emergency use of other medicines to treat   people with COVID-19. Go to https://www.fda.gov/emergency-preparedness-andresponse/mcm-legal-regulatory-and-policy-framework/emergency-use-authorization for   information on the emergency use of other medicines that are authorized by FDA to   treat people with COVID-19. Your healthcare provider may talk with you about clinical   trials for which you may be eligible.   It is your choice to be treated or not to be treated with PAXLOVID. Should you decide   not to receive it or for your child not to receive it, it will not change your standard   medical care.  What if I am pregnant or breastfeeding?  There is no experience treating pregnant women or breastfeeding mothers with   PAXLOVID. For a mother and unborn baby, the benefit of taking PAXLOVID may be   greater than the risk from the treatment. If you are pregnant, discuss your options and   specific situation with your healthcare provider.  It is recommended that you use effective barrier contraception or do not have sexual   activity while taking PAXLOVID.  If you are breastfeeding, discuss your options and specific situation with your   healthcare provider.  5 Revised: 18 March 2022  How do I report side effects with PAXLOVID?   Contact your healthcare provider if you have any side effects that bother you or do not   go away.   Report side effects to FDA MedWatch at www.fda.gov/medwatch or call 3-663-LRL1215 or you can report side effects to [employer] at the contact information provided   below.  Website Fax number Telephone number  www.[employer] [phone]  How should I store PAXLOVID?  Store PAXLOVID tablets at  room temperature, between 68?F to 77?F (20?C to 25?C).  How can I learn more about COVID-19?   ? Ask your healthcare provider.  ? Visit https://www.cdc.gov/COVID19.  ? Contact your local or state public health department.  What is an Emergency Use Authorization (EUA)?  The [place] FDA has made PAXLOVID available under an emergency access   mechanism called an Emergency Use Authorization (EUA). The EUA is supported by a    of Health and Human Service ([facility]) declaration that circumstances exist to   justify the emergency use of drugs and biological products during the COVID-19   pandemic.   PAXLOVID for the treatment of mild-to-moderate COVID-19 in adults and children   [12 years of age and older weighing at least 88 pounds (40 kg)] with positive results of   direct SARS-CoV-2 viral testing, and who are at high risk for progression to severe   COVID-19, including hospitalization or death, has not undergone the same type of   review as an FDA-approved product. In issuing an EUA under the COVID-19 public   health emergency, the FDA has determined, among other things, that based on the   total amount of scientific evidence available including data from adequate and   well-controlled clinical trials, if available, it is reasonable to believe that the product may   be effective for diagnosing, treating, or preventing COVID-19, or a serious or   life-threatening disease or condition caused by COVID-19; that the known and potential   benefits of the product, when used to diagnose, treat, or prevent such disease or   condition, outweigh the known and potential risks of such product; and that there are no   adequate, approved, and available alternatives.  All of these criteria must be met to allow for the product to be used in the treatment of   patients during the COVID-19 pandemic. The EUA for PAXLOVID is in effect for the   duration of the COVID-19 declaration justifying emergency use of this product, unless    terminated or revoked (after which the products may no longer be used under the   EUA).  6 Revised: 18 March 2022  Additional Information  For general questions, visit the website or call the telephone number provided below.   Website Telephone number  [employer]  [phone]  (5-566-S58-AJWR)  You can also go to [email or web address] or call [phone] for more   information.  LAB-1494-3.0  Revised: 18 March 2022

## 2023-04-11 ENCOUNTER — LAB (OUTPATIENT)
Dept: ONCOLOGY | Facility: HOSPITAL | Age: 33
End: 2023-04-11
Payer: COMMERCIAL

## 2023-04-11 ENCOUNTER — OFFICE VISIT (OUTPATIENT)
Dept: ONCOLOGY | Facility: CLINIC | Age: 33
End: 2023-04-11
Payer: COMMERCIAL

## 2023-04-11 VITALS
RESPIRATION RATE: 18 BRPM | WEIGHT: 253.8 LBS | DIASTOLIC BLOOD PRESSURE: 93 MMHG | OXYGEN SATURATION: 98 % | BODY MASS INDEX: 41.47 KG/M2 | SYSTOLIC BLOOD PRESSURE: 163 MMHG | HEART RATE: 91 BPM | TEMPERATURE: 98 F

## 2023-04-11 DIAGNOSIS — K90.9 IRON MALABSORPTION: ICD-10-CM

## 2023-04-11 DIAGNOSIS — K90.9 IRON MALABSORPTION: Chronic | ICD-10-CM

## 2023-04-11 DIAGNOSIS — E61.1 IRON DEFICIENCY: ICD-10-CM

## 2023-04-11 DIAGNOSIS — D72.828 OTHER ELEVATED WHITE BLOOD CELL (WBC) COUNT: ICD-10-CM

## 2023-04-11 DIAGNOSIS — R71.8 MICROCYTOSIS: Chronic | ICD-10-CM

## 2023-04-11 DIAGNOSIS — D75.839 THROMBOCYTOSIS: Chronic | ICD-10-CM

## 2023-04-11 DIAGNOSIS — D75.839 THROMBOCYTOSIS: ICD-10-CM

## 2023-04-11 DIAGNOSIS — E61.1 IRON DEFICIENCY: Primary | Chronic | ICD-10-CM

## 2023-04-11 DIAGNOSIS — D72.828 OTHER ELEVATED WHITE BLOOD CELL (WBC) COUNT: Chronic | ICD-10-CM

## 2023-04-11 DIAGNOSIS — R71.8 MICROCYTOSIS: ICD-10-CM

## 2023-04-11 LAB
BASOPHILS # BLD AUTO: NORMAL 10*3/UL
BASOPHILS NFR BLD AUTO: NORMAL %
EOSINOPHIL # BLD AUTO: NORMAL 10*3/UL
EOSINOPHIL NFR BLD AUTO: NORMAL %
ERYTHROCYTE [DISTWIDTH] IN BLOOD BY AUTOMATED COUNT: NORMAL %
FERRITIN SERPL-MCNC: 492.9 NG/ML (ref 13–150)
FOLATE SERPL-MCNC: 12.4 NG/ML (ref 4.78–24.2)
HCT VFR BLD AUTO: NORMAL %
HGB BLD-MCNC: NORMAL G/DL
IRON 24H UR-MRATE: 46 MCG/DL (ref 37–145)
IRON SATN MFR SERPL: 12 % (ref 20–50)
LYMPHOCYTES # BLD AUTO: NORMAL 10*3/UL
LYMPHOCYTES NFR BLD AUTO: NORMAL %
MCH RBC QN AUTO: NORMAL PG
MCHC RBC AUTO-ENTMCNC: NORMAL G/DL
MCV RBC AUTO: NORMAL FL
MONOCYTES # BLD AUTO: NORMAL 10*3/UL
MONOCYTES NFR BLD AUTO: NORMAL %
NEUTROPHILS NFR BLD AUTO: NORMAL %
NEUTROPHILS NFR BLD AUTO: NORMAL %
PLATELET # BLD AUTO: NORMAL 10*3/UL
RBC # BLD AUTO: NORMAL 10*6/UL
TIBC SERPL-MCNC: 368 MCG/DL (ref 298–536)
TRANSFERRIN SERPL-MCNC: 247 MG/DL (ref 200–360)
VIT B12 BLD-MCNC: 918 PG/ML (ref 211–946)
WBC NRBC COR # BLD: NORMAL 10*3/UL

## 2023-04-11 PROCEDURE — 84466 ASSAY OF TRANSFERRIN: CPT

## 2023-04-11 PROCEDURE — 85025 COMPLETE CBC W/AUTO DIFF WBC: CPT

## 2023-04-11 PROCEDURE — 82728 ASSAY OF FERRITIN: CPT

## 2023-04-11 PROCEDURE — 82746 ASSAY OF FOLIC ACID SERUM: CPT

## 2023-04-11 PROCEDURE — 82607 VITAMIN B-12: CPT

## 2023-04-11 PROCEDURE — G0463 HOSPITAL OUTPT CLINIC VISIT: HCPCS | Performed by: INTERNAL MEDICINE

## 2023-04-11 PROCEDURE — 83540 ASSAY OF IRON: CPT

## 2023-04-11 NOTE — PROGRESS NOTES
DATE OF VISIT: 4/11/2023      REASON FOR VISIT: Leukocytosis, microcytosis due to iron deficiency, thrombocytosis      HISTORY OF PRESENT ILLNESS:   32-year-old female with medical problem consisting of anxiety/depression, migraine, seasonal allergies, irregular and heavy menstrual bleeding since menarche who was initially seen in consultation on December 14, 2021 for leukocytosis, iron deficiency, microcytosis and thrombocytosis.  Patient is here for follow-up appointment today.  Denies any bleeding except for menstrual bleeding.  Denies any new lymph node enlargement.  Complains of arthralgia.  Denies any new chest pain or shortness of breath.              Past Medical History, Past Surgical History, Social History, Family History have been reviewed and are without significant changes except as mentioned.    Review of Systems   A comprehensive 14 point review of systems was performed and was negative except as mentioned in HPI.    Medications:  The current medication list was reviewed in the EMR    ALLERGIES:    Allergies   Allergen Reactions   • Flagyl [Metronidazole] Hives   • Nabumetone Nausea Only   • Penicillins Hives   • Sulfa Antibiotics Hives   • Amiodarone Rash   • Latex Rash       Objective      Vitals:    04/11/23 1313   BP: 163/93   Pulse: 91   Resp: 18   Temp: 98 °F (36.7 °C)   SpO2: 98%   Weight: 115 kg (253 lb 12.8 oz)   PainSc:   4   PainLoc: Back          View : No data to display.                Physical Exam  Pulmonary:      Breath sounds: Normal breath sounds.   Neurological:      Mental Status: She is alert and oriented to person, place, and time.           RECENT LABS:  Glucose   Date Value Ref Range Status   06/30/2022 100 (H) 65 - 99 mg/dL Final     Sodium   Date Value Ref Range Status   06/30/2022 139 136 - 145 mmol/L Final   05/28/2022 137 136 - 145 mmol/L Final     Potassium   Date Value Ref Range Status   06/30/2022 3.9 3.5 - 5.2 mmol/L Final   05/28/2022 3.9 3.5 - 5.1 mmol/L Final      CO2   Date Value Ref Range Status   06/30/2022 23.0 22.0 - 29.0 mmol/L Final     Total CO2   Date Value Ref Range Status   05/28/2022 24 21 - 31 mmol/L Final     Chloride   Date Value Ref Range Status   06/30/2022 107 98 - 107 mmol/L Final   05/28/2022 104 98 - 107 mmol/L Final     Anion Gap   Date Value Ref Range Status   06/30/2022 9.0 5.0 - 15.0 mmol/L Final     Creatinine   Date Value Ref Range Status   06/30/2022 0.58 0.57 - 1.00 mg/dL Final   05/28/2022 0.5 (L) 0.7 - 1.3 mg/dL Final     BUN   Date Value Ref Range Status   06/30/2022 10 6 - 20 mg/dL Final   05/28/2022 9 7 - 25 mg/dL Final     BUN/Creatinine Ratio   Date Value Ref Range Status   06/30/2022 17.2 7.0 - 25.0 Final     Calcium   Date Value Ref Range Status   06/30/2022 8.7 8.6 - 10.5 mg/dL Final   05/28/2022 8.6 8.6 - 10.3 mg/dL Final     eGFR Non  Amer   Date Value Ref Range Status   10/23/2021 110 >60 mL/min/1.73 Final     Alkaline Phosphatase   Date Value Ref Range Status   06/30/2022 95 39 - 117 U/L Final   05/28/2022 84 34 - 104 U/L Final     Total Protein   Date Value Ref Range Status   06/30/2022 6.8 6.0 - 8.5 g/dL Final     ALT (SGPT)   Date Value Ref Range Status   06/30/2022 20 1 - 33 U/L Final   05/28/2022 15 7 - 52 U/L Final     AST (SGOT)   Date Value Ref Range Status   06/30/2022 14 1 - 32 U/L Final   05/28/2022 17 13 - 39 U/L Final     Total Bilirubin   Date Value Ref Range Status   06/30/2022 0.2 0.0 - 1.2 mg/dL Final   05/28/2022 0.32 0.3 - 1.0 mg/dL Final     Albumin   Date Value Ref Range Status   06/30/2022 3.70 3.50 - 5.20 g/dL Final   05/28/2022 3.9 3.5 - 5.7 g/dL Final     Globulin   Date Value Ref Range Status   06/30/2022 3.1 gm/dL Final     Lab Results   Component Value Date    WBC 16.53 (H) 01/10/2023    HGB 13.3 01/10/2023    HCT 42.4 01/10/2023    MCV 82.0 01/10/2023     01/10/2023     Lab Results   Component Value Date    NEUTROABS 11.79 (H) 01/10/2023    IRON 42 01/10/2023    IRON 49 10/11/2022     IRON 49 06/23/2022    TIBC 334 01/10/2023    TIBC 353 10/11/2022    TIBC 408 06/23/2022    LABIRON 13 (L) 01/10/2023    LABIRON 14 (L) 10/11/2022    LABIRON 12 (L) 06/23/2022    FERRITIN 340.90 (H) 01/10/2023    FERRITIN 292.60 (H) 10/11/2022    FERRITIN 65.95 06/23/2022    [id number] 747 01/10/2023    [id number] 701 10/11/2022    [id number] 809 06/23/2022    FOLATE 13.50 01/10/2023    FOLATE >20.00 10/11/2022    FOLATE 16.00 06/23/2022     No results found for: , LABCA2, AFPTM, HCGQUANT, , CHROMGRNA, [id number], CEA, REFLABREPO      PATHOLOGY:  * Cannot find OR log *         RADIOLOGY DATA :  X-ray wrist left PA lateral and oblique    Result Date: 4/4/2023  Negative radiographs of the left wrist. [id number]          Assessment & Plan     1.  Leukocytosis:  - Patient has been having ongoing leukocytosis with white blood cell count ranging between 12,000 and 19,000.  - C-reactive protein in the past has been elevated at 1.33 consistent with inflammatory cause.  - Peripheral blood flow cytometry on December 14, 2021 was negative for leukemia lymphoma.  - CBC done today shows white blood cell count is 15,000 which is again predominantly increasing neutrophil  - We will monitor with CBC for now  - Patient will return to clinic in 3 months with repeat CBC, iron studies, ferritin, B12 and folate to be done on that day.    2.  Iron deficiency anemia with microcytosis  - Due to iron malabsorption patient has received intravenous Venofer in July 2022.  - Currently on B12 injection monthly  - Anemia work-up done today shows hemoglobin is 14.8.  Iron studies are adequate no need for any intravenous iron replacement at present.    3.  Thrombocytosis:  - Reactive due to iron deficiency  - Platelet count is 433,000  - We will monitor with CBC    4.  Hypertension    5.  Health maintenance: Patient does not smoke                     PHQ-9 Total Score: 3   -Patient is not homicidal or suicidal.  No acute intervention  required.    Sanjuana Allison reports a pain score of 4.  Given her pain assessment as noted, treatment options were discussed and the following options were decided upon as a follow-up plan to address the patient's pain: continuation of current treatment plan for pain.         Ankit Espinoza MD  4/11/2023  13:26 CDT        Part of this note may be an electronic transcription/translation of spoken language to printed text using the Dragon Dictation System.          CC:

## 2023-04-12 ENCOUNTER — TELEPHONE (OUTPATIENT)
Dept: ONCOLOGY | Facility: CLINIC | Age: 33
End: 2023-04-12
Payer: COMMERCIAL

## 2023-04-12 NOTE — TELEPHONE ENCOUNTER
Called and spoke with pt regarding lab results and medication instructions as per Dr. Espinoza, v/u obtained.

## 2023-04-12 NOTE — TELEPHONE ENCOUNTER
----- Message from Ankit Espinoza MD sent at 4/12/2023  6:35 AM CDT -----  Please let patient know, her white blood cell was again elevated at 15,000 today.  Hemoglobin is normal at 14.8 platelet count is 433,000.  Iron studies are adequate, no need for any intravenous iron replacement at present.  Recommend continue with monthly B12 injection.  No need for any folic acid supplementation at present.  Thank you

## 2023-05-25 ENCOUNTER — OFFICE VISIT (OUTPATIENT)
Dept: GASTROENTEROLOGY | Facility: CLINIC | Age: 33
End: 2023-05-25
Payer: COMMERCIAL

## 2023-05-25 VITALS
SYSTOLIC BLOOD PRESSURE: 148 MMHG | WEIGHT: 253.8 LBS | HEIGHT: 66 IN | BODY MASS INDEX: 40.79 KG/M2 | DIASTOLIC BLOOD PRESSURE: 84 MMHG | HEART RATE: 88 BPM

## 2023-05-25 DIAGNOSIS — K21.00 GASTROESOPHAGEAL REFLUX DISEASE WITH ESOPHAGITIS WITHOUT HEMORRHAGE: Primary | ICD-10-CM

## 2023-05-25 DIAGNOSIS — R13.19 ESOPHAGEAL DYSPHAGIA: ICD-10-CM

## 2023-05-25 PROCEDURE — 99213 OFFICE O/P EST LOW 20 MIN: CPT | Performed by: NURSE PRACTITIONER

## 2023-05-25 RX ORDER — SODIUM CHLORIDE 9 MG/ML
40 INJECTION, SOLUTION INTRAVENOUS AS NEEDED
OUTPATIENT
Start: 2023-05-25

## 2023-05-25 RX ORDER — DEXTROSE AND SODIUM CHLORIDE 5; .45 G/100ML; G/100ML
30 INJECTION, SOLUTION INTRAVENOUS CONTINUOUS PRN
OUTPATIENT
Start: 2023-05-25

## 2023-05-25 NOTE — PROGRESS NOTES
Chief Complaint   Patient presents with   • Difficulty Swallowing       Subjective    Sanjuana Allison is a 33 y.o. female. she is here today for follow-up.                                                                  Assessment & Plan                                     1. Gastroesophageal reflux disease with esophagitis without hemorrhage    2. Esophageal dysphagia      Plan; schedule patient for EGD with dilation if indicated due to history of esophageal stricture and recurrent dysphagia.  Continue PPI daily discussed importance of safe swallowing measures.  Follow-up after test return office sooner if needed    Follow-up: Return in about 4 weeks (around 6/22/2023) for Recheck, After test.     HPI  33-year-old female presents to discuss recurrent dysphagia and chronic GERD.  States she has been taking Protonix daily about 2 months ago began have trouble with swallowing oral intake and pills.  She denies any abdominal pain, nausea, vomiting change in bowel habits or blood in stool.  Previous EGD completed March 2022 noted benign-appearing stenosis dilated 54 [place] with good relief of symptoms after dilation.  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    Review of Systems  Review of Systems   Constitutional: Negative for activity change, appetite change, chills, diaphoresis, fatigue, fever and unexpected weight change.   HENT: Positive for trouble swallowing. Negative for sore throat.    Respiratory: Negative for shortness of breath.    Gastrointestinal: Negative for abdominal distention, abdominal pain, anal bleeding, blood in stool, constipation, diarrhea, nausea, rectal pain and vomiting.   Musculoskeletal: Negative for arthralgias.   Skin: Negative for pallor.   Neurological: Negative for light-headedness.       /84 (BP Location: Left arm)   Pulse  "88   Ht 166.6 cm (65.6\")   Wt 115 kg (253 lb 12.8 oz)   BMI 41.47 kg/m²     Objective      Physical Exam  Constitutional:       General: She is not in acute distress.     Appearance: Normal appearance. She is obese. She is not ill-appearing or toxic-appearing.   HENT:      Head: Normocephalic and atraumatic.   Pulmonary:      Effort: Pulmonary effort is normal.   Abdominal:      General: Abdomen is flat. Bowel sounds are normal. There is no distension.      Palpations: Abdomen is soft. There is no mass.      Tenderness: There is abdominal tenderness in the epigastric area.   Neurological:      Mental Status: She is alert.               The following portions of the patient's history were reviewed and updated as appropriate:   Past Medical History:   Diagnosis Date   • Acid reflux    • Allergic    • Anxiety    • Depression    • Gallbladder abscess    • Migraines    • Sinusitis    • Stomach ulcer      Past Surgical History:   Procedure Laterality Date   • CHOLECYSTECTOMY     • ENDOSCOPY N/A 5/23/2018    Procedure: ESOPHAGOGASTRODUODENOSCOPY possible dilation;  Surgeon: Rafael Garcia MD;  Location: [facility] ENDOSCOPY;  Service: Gastroenterology   • ENDOSCOPY N/A 3/21/2022    Procedure: ESOPHAGOGASTRODUODENOSCOPY possible dilation;  Surgeon: Rafael Garcia MD;  Location: [facility] ENDOSCOPY;  Service: Gastroenterology;  Laterality: N/A;   • FINGER NAIL SURGERY     • GALLBLADDER SURGERY     • TONSILLECTOMY AND ADENOIDECTOMY     • WISDOM TOOTH EXTRACTION       Family History   Problem Relation Age of Onset   • Arthritis Mother    • Diabetes Mother    • Cancer Mother         breat cancer   • Hypertension Mother    • Hypertension Father    • Hypertension Maternal Grandmother    • Heart disease Maternal Grandfather    • Hypertension Maternal Grandfather      OB History    No obstetric history on file.       Allergies   Allergen Reactions   • Flagyl [Metronidazole] Hives   • Nabumetone Nausea Only   • Penicillins Hives   • " Sulfa Antibiotics Hives   • Amiodarone Rash   • Latex Rash     Social History     Socioeconomic History   • Marital status:    Tobacco Use   • Smoking status: Never   • Smokeless tobacco: Never   Substance and Sexual Activity   • Alcohol use: No   • Drug use: No   • Sexual activity: Defer     Birth control/protection: Injection     Current Medications:  Prior to Admission medications    Medication Sig Start Date End Date Taking? Authorizing Provider   albuterol sulfate  (90 Base) MCG/ACT inhaler Inhale 2 puffs As Needed for Wheezing.   Yes Provider, MD El   betamethasone valerate (VALISONE) 0.1 % ointment  6/10/22  Yes Provider, MD El   cyclobenzaprine (FLEXERIL) 10 MG tablet Take 1 tablet by mouth 3 (Three) Times a Day As Needed for Muscle Spasms. 1/31/22  Yes Mercedes Butler APRN   docusate sodium (COLACE) 100 MG capsule Take 1 capsule by mouth 2 (Two) Times a Day As Needed for Constipation. 12/15/21  Yes Ankit Espinoza MD   ergocalciferol (ERGOCALCIFEROL) 1.25 MG (15758 UT) capsule Take 1 capsule by mouth. 5/31/22 6/1/23 Yes ProviderEl MD   fluticasone (Flonase) 50 MCG/ACT nasal spray 2 sprays into the nostril(s) as directed by provider Daily As Needed for Allergies. 4/26/22  Yes Candie Puga APRN   gabapentin (NEURONTIN) 300 MG capsule  9/2/22  Yes ProviderEl MD   hydrOXYzine pamoate (VISTARIL) 50 MG capsule Take 1 capsule by mouth 3 (Three) Times a Day As Needed for Itching. 4/16/22  Yes Mercedes Butler APRN   montelukast (SINGULAIR) 10 MG tablet Take 1 tablet by mouth. 5/27/22 5/28/23 Yes ProviderEl MD   pantoprazole (PROTONIX) 40 MG EC tablet Take 1 tablet by mouth. 9/12/22  Yes Provider, MD El   promethazine-dextromethorphan (PROMETHAZINE-DM) 6.25-15 MG/5ML syrup  9/7/22  Yes Provider, MD El   propranolol (INDERAL) 10 MG tablet Take 1 tablet by mouth 3 (Three) Times a Day. 1/10/23 1/11/24 Yes  El Medrano MD   topiramate (TOPAMAX) 25 MG capsule (sprinkle) Take 1 capsule by mouth 2 (Two) Times a Day. 9/22/22  Yes El Medrano MD   vitamin B-12 (CYANOCOBALAMIN) 1000 MCG tablet Take 1 tablet by mouth Daily. 12/15/21  Yes Ankit Espinoza MD   benzonatate (TESSALON) 200 MG capsule  10/28/22   El Medrano MD   citalopram (CeleXA) 10 MG tablet Take 20 mg by mouth Daily. 5/31/22 2/16/23  El Medrano MD   gabapentin (NEURONTIN) 300 MG capsule Take 1 capsule by mouth 3 (Three) Times a Day. 9/2/22 12/2/22  El Medrano MD     Orders placed during this encounter include:  Orders Placed This Encounter   Procedures   • Obtain Informed Consent     Standing Status:   Future     Order Specific Question:   Informed Consent Given For     Answer:   ESOPHAGOGASTRODUODENOSCOPY possible dilation     ESOPHAGOGASTRODUODENOSCOPY possible dilation (N/A)  No orders of the defined types were placed in this encounter.        Review and/or summary of lab tests, radiology, procedures, medications. Review and summary of old records and obtaining of history. The risks and benefits of my recommendations, as well as other treatment options were discussed . Any questions/concerned were answered. Patient voiced understanding and agreement.          This document has been electronically signed by MARIA GUADALUPE Heredia on May 25, 2023 14:09 CDT                                               Results for orders placed or performed in visit on 04/11/23   CBC Auto Differential    Specimen: Arm, Right; Blood   Result Value Ref Range    WBC      RBC      Hemoglobin      Hematocrit      MCV      MCH      MCHC      RDW      Platelets      Neutrophil %      Lymphocyte %      Monocyte %      Eosinophil %      Basophil %      Neutrophils, Absolute      Lymphocytes, Absolute      Monocytes, Absolute      Eosinophils, Absolute      Basophils, Absolute     Iron and TIBC    Specimen: Blood   Result Value Ref Range     Iron 46 37 - 145 mcg/dL    Iron Saturation 12 (L) 20 - 50 %    Transferrin 247 200 - 360 mg/dL    TIBC 368 298 - 536 mcg/dL   Folate    Specimen: Blood   Result Value Ref Range    Folate 12.40 4.78 - 24.20 ng/mL   Ferritin    Specimen: Blood   Result Value Ref Range    Ferritin 492.90 (H) 13.00 - 150.00 ng/mL   Vitamin B12    Specimen: Blood   Result Value Ref Range    Vitamin B-12 918 211 - 946 pg/mL   Results for orders placed or performed in visit on 01/10/23   Vitamin B12 & Folate    Specimen: Arm, Left; Blood   Result Value Ref Range    Folate 13.50 4.78 - 24.20 ng/mL    Vitamin B-12 747 211 - 946 pg/mL   CBC Auto Differential    Specimen: Arm, Left; Blood   Result Value Ref Range    WBC 16.53 (H) 3.40 - 10.80 10*3/mm3    RBC 5.17 3.77 - 5.28 10*6/mm3    Hemoglobin 13.3 12.0 - 15.9 g/dL    Hematocrit 42.4 34.0 - 46.6 %    MCV 82.0 79.0 - 97.0 fL    MCH 25.7 (L) 26.6 - 33.0 pg    MCHC 31.4 (L) 31.5 - 35.7 g/dL    RDW 14.4 12.3 - 15.4 %    RDW-SD 42.1 37.0 - 54.0 fl    MPV 8.3 6.0 - 12.0 fL    Platelets 434 140 - 450 10*3/mm3    Neutrophil % 71.3 42.7 - 76.0 %    Lymphocyte % 20.4 19.6 - 45.3 %    Monocyte % 4.9 (L) 5.0 - 12.0 %    Eosinophil % 2.5 0.3 - 6.2 %    Basophil % 0.4 0.0 - 1.5 %    Immature Grans % 0.5 0.0 - 0.5 %    Neutrophils, Absolute 11.79 (H) 1.70 - 7.00 10*3/mm3    Lymphocytes, Absolute 3.37 (H) 0.70 - 3.10 10*3/mm3    Monocytes, Absolute 0.81 0.10 - 0.90 10*3/mm3    Eosinophils, Absolute 0.41 (H) 0.00 - 0.40 10*3/mm3    Basophils, Absolute 0.07 0.00 - 0.20 10*3/mm3    Immature Grans, Absolute 0.08 (H) 0.00 - 0.05 10*3/mm3    nRBC 0.0 0.0 - 0.2 /100 WBC   Iron Profile    Specimen: Arm, Left; Blood   Result Value Ref Range    Iron 42 37 - 145 mcg/dL    Iron Saturation 13 (L) 20 - 50 %    Transferrin 224 200 - 360 mg/dL    TIBC 334 298 - 536 mcg/dL   Ferritin    Specimen: Arm, Left; Blood   Result Value Ref Range    Ferritin 340.90 (H) 13.00 - 150.00 ng/mL   Results for orders placed or performed  in visit on 10/11/22   Scan Slide    Specimen: Arm, Right; Blood   Result Value Ref Range    Acanthocytes Slight/1+ None Seen    Anisocytosis Slight/1+ None Seen    WBC Morphology Normal Normal    Platelet Estimate Adequate Normal   CBC Auto Differential    Specimen: Arm, Right; Blood   Result Value Ref Range    WBC 16.29 (H) 3.40 - 10.80 10*3/mm3    RBC 5.49 (H) 3.77 - 5.28 10*6/mm3    Hemoglobin 14.4 12.0 - 15.9 g/dL    Hematocrit 44.6 34.0 - 46.6 %    MCV 81.2 79.0 - 97.0 fL    MCH 26.2 (L) 26.6 - 33.0 pg    MCHC 32.3 31.5 - 35.7 g/dL    RDW 15.5 (H) 12.3 - 15.4 %    RDW-SD 45.5 37.0 - 54.0 fl    MPV 8.6 6.0 - 12.0 fL    Platelets 379 140 - 450 10*3/mm3    Neutrophil % 63.0 42.7 - 76.0 %    Lymphocyte % 28.2 19.6 - 45.3 %    Monocyte % 5.1 5.0 - 12.0 %    Eosinophil % 2.3 0.3 - 6.2 %    Basophil % 0.5 0.0 - 1.5 %    Immature Grans % 0.9 (H) 0.0 - 0.5 %    Neutrophils, Absolute 10.28 (H) 1.70 - 7.00 10*3/mm3    Lymphocytes, Absolute 4.59 (H) 0.70 - 3.10 10*3/mm3    Monocytes, Absolute 0.83 0.10 - 0.90 10*3/mm3    Eosinophils, Absolute 0.37 0.00 - 0.40 10*3/mm3    Basophils, Absolute 0.08 0.00 - 0.20 10*3/mm3    Immature Grans, Absolute 0.14 (H) 0.00 - 0.05 10*3/mm3    nRBC 0.0 0.0 - 0.2 /100 WBC   Iron and TIBC    Specimen: Arm, Right; Blood   Result Value Ref Range    Iron 49 37 - 145 mcg/dL    Iron Saturation 14 (L) 20 - 50 %    Transferrin 237 200 - 360 mg/dL    TIBC 353 298 - 536 mcg/dL   Folate    Specimen: Arm, Right; Blood   Result Value Ref Range    Folate >20.00 4.78 - 24.20 ng/mL   Ferritin    Specimen: Arm, Right; Blood   Result Value Ref Range    Ferritin 292.60 (H) 13.00 - 150.00 ng/mL   Vitamin B12    Specimen: Arm, Right; Blood   Result Value Ref Range    Vitamin B-12 701 211 - 946 pg/mL   Results for orders placed or performed during the hospital encounter of 06/30/22   Gold Top - SST   Result Value Ref Range    Extra Tube Hold for add-ons.    Green Top (Gel)   Result Value Ref Range    Extra Tube  Hold for add-ons.    Scan Slide    Specimen: Blood   Result Value Ref Range    Anisocytosis Slight/1+ None Seen    WBC Morphology Normal Normal    Platelet Morphology Normal Normal   CBC Auto Differential    Specimen: Blood   Result Value Ref Range    WBC 14.68 (H) 3.40 - 10.80 10*3/mm3    RBC 5.32 (H) 3.77 - 5.28 10*6/mm3    Hemoglobin 12.6 12.0 - 15.9 g/dL    Hematocrit 39.3 34.0 - 46.6 %    MCV 73.9 (L) 79.0 - 97.0 fL    MCH 23.7 (L) 26.6 - 33.0 pg    MCHC 32.1 31.5 - 35.7 g/dL    RDW 17.2 (H) 12.3 - 15.4 %    RDW-SD 43.8 37.0 - 54.0 fl    MPV 9.4 6.0 - 12.0 fL    Platelets 297 140 - 450 10*3/mm3    Neutrophil % 67.4 42.7 - 76.0 %    Lymphocyte % 24.1 19.6 - 45.3 %    Monocyte % 5.4 5.0 - 12.0 %    Eosinophil % 2.1 0.3 - 6.2 %    Basophil % 0.5 0.0 - 1.5 %    Immature Grans % 0.5 0.0 - 0.5 %    Neutrophils, Absolute 9.89 (H) 1.70 - 7.00 10*3/mm3    Lymphocytes, Absolute 3.54 (H) 0.70 - 3.10 10*3/mm3    Monocytes, Absolute 0.79 0.10 - 0.90 10*3/mm3    Eosinophils, Absolute 0.31 0.00 - 0.40 10*3/mm3    Basophils, Absolute 0.08 0.00 - 0.20 10*3/mm3    Immature Grans, Absolute 0.07 (H) 0.00 - 0.05 10*3/mm3    nRBC 0.0 0.0 - 0.2 /100 WBC     *Note: Due to a large number of results and/or encounters for the requested time period, some results have not been displayed. A complete set of results can be found in Results Review.

## 2023-06-12 ENCOUNTER — LAB (OUTPATIENT)
Dept: LAB | Facility: HOSPITAL | Age: 33
End: 2023-06-12
Payer: COMMERCIAL

## 2023-06-12 ENCOUNTER — OFFICE VISIT (OUTPATIENT)
Dept: OBSTETRICS AND GYNECOLOGY | Facility: CLINIC | Age: 33
End: 2023-06-12
Payer: COMMERCIAL

## 2023-06-12 VITALS
HEIGHT: 65 IN | DIASTOLIC BLOOD PRESSURE: 84 MMHG | SYSTOLIC BLOOD PRESSURE: 128 MMHG | BODY MASS INDEX: 42.99 KG/M2 | WEIGHT: 258 LBS

## 2023-06-12 DIAGNOSIS — E28.2 PCOS (POLYCYSTIC OVARIAN SYNDROME): ICD-10-CM

## 2023-06-12 DIAGNOSIS — N93.9 ABNORMAL UTERINE BLEEDING (AUB): Primary | ICD-10-CM

## 2023-06-12 DIAGNOSIS — N93.9 ABNORMAL UTERINE BLEEDING (AUB): ICD-10-CM

## 2023-06-12 DIAGNOSIS — E66.01 MORBID OBESITY WITH BMI OF 40.0-44.9, ADULT: ICD-10-CM

## 2023-06-12 LAB
BASOPHILS # BLD MANUAL: 0.11 10*3/MM3 (ref 0–0.2)
BASOPHILS NFR BLD MANUAL: 1 % (ref 0–1.5)
DEPRECATED RDW RBC AUTO: 38.8 FL (ref 37–54)
EOSINOPHIL # BLD MANUAL: 0.23 10*3/MM3 (ref 0–0.4)
EOSINOPHIL NFR BLD MANUAL: 2.1 % (ref 0.3–6.2)
ERYTHROCYTE [DISTWIDTH] IN BLOOD BY AUTOMATED COUNT: 13.6 % (ref 12.3–15.4)
HBA1C MFR BLD: 5.4 % (ref 4.8–5.6)
HCT VFR BLD AUTO: 44.4 % (ref 34–46.6)
HGB BLD-MCNC: 15 G/DL (ref 12–15.9)
LYMPHOCYTES # BLD MANUAL: 3.02 10*3/MM3 (ref 0.7–3.1)
LYMPHOCYTES NFR BLD MANUAL: 8.2 % (ref 5–12)
MCH RBC QN AUTO: 27 PG (ref 26.6–33)
MCHC RBC AUTO-ENTMCNC: 33.8 G/DL (ref 31.5–35.7)
MCV RBC AUTO: 79.9 FL (ref 79–97)
MONOCYTES # BLD: 0.89 10*3/MM3 (ref 0.1–0.9)
NEUTROPHILS # BLD AUTO: 6.61 10*3/MM3 (ref 1.7–7)
NEUTROPHILS NFR BLD MANUAL: 60.8 % (ref 42.7–76)
PLAT MORPH BLD: NORMAL
PLATELET # BLD AUTO: 220 10*3/MM3 (ref 140–450)
PMV BLD AUTO: 10.8 FL (ref 6–12)
PROLACTIN SERPL-MCNC: 7.65 NG/ML (ref 4.79–23.3)
RBC # BLD AUTO: 5.56 10*6/MM3 (ref 3.77–5.28)
RBC MORPH BLD: NORMAL
TESTOST SERPL-MCNC: 21 NG/DL (ref 8.4–48.1)
VARIANT LYMPHS NFR BLD MANUAL: 27.8 % (ref 19.6–45.3)
WBC MORPH BLD: NORMAL
WBC NRBC COR # BLD: 10.87 10*3/MM3 (ref 3.4–10.8)

## 2023-06-12 PROCEDURE — 84403 ASSAY OF TOTAL TESTOSTERONE: CPT

## 2023-06-12 PROCEDURE — 83525 ASSAY OF INSULIN: CPT

## 2023-06-12 PROCEDURE — 84146 ASSAY OF PROLACTIN: CPT

## 2023-06-12 PROCEDURE — 83036 HEMOGLOBIN GLYCOSYLATED A1C: CPT

## 2023-06-12 PROCEDURE — 85007 BL SMEAR W/DIFF WBC COUNT: CPT

## 2023-06-12 PROCEDURE — 85025 COMPLETE CBC W/AUTO DIFF WBC: CPT

## 2023-06-12 PROCEDURE — 36415 COLL VENOUS BLD VENIPUNCTURE: CPT | Performed by: OBSTETRICS & GYNECOLOGY

## 2023-06-12 PROCEDURE — 82627 DEHYDROEPIANDROSTERONE: CPT | Performed by: OBSTETRICS & GYNECOLOGY

## 2023-06-12 NOTE — PROGRESS NOTES
[facility]  Gynecology Consult  Date of Service: 2023  Referring provider: MARIA GUADALUPE Ozuna    CC: Irregular periods    HPI  Sanjuana Allison is a 33 y.o.  premenopausal female who presents as a referral from MARIA GUADALUPE Herman secondary to irregular periods.    She states that she has always had irregular periods since age 9.  She states that from age [age], she was on BCP or DepoProvera; she states that they were more regular during this time.  She states that they are irregular in both length and frequency.  She states that she may bleed from 1 week to 8 months, varying between spotting to heavy bleeding.    She states that the longest she has gone without a period was 3 months.  She states that in March, she bled for 3 weeks; she did not bleed in April; and last month in May, she bled for 2 weeks.  She reports hair on her chin as well as hormonal acne.    She does desire to conceive; she states that they have been trying for the past 4 years.    ROS  Review of Systems   Constitutional: Negative.    HENT: Negative.    Eyes: Negative.    Respiratory: Negative.    Cardiovascular: Negative.    Gastrointestinal: Negative.    Endocrine: Negative.    Genitourinary: Positive for menstrual problem.   Musculoskeletal: Negative.    Skin: Negative.    Allergic/Immunologic: Negative.    Neurological: Negative.    Hematological: Negative.    Psychiatric/Behavioral: Negative.      GYN HISTORY  Menarche: age 9  Menses:see HPI  History of STIs: None  Last pap smear: 2016  Abnormal pap smear history: None per patient  Contraception: None     OB HISTORY  OB History    Para Term  AB Living   0 0 0 0 0 0   SAB IAB Ectopic Molar Multiple Live Births   0 0 0 0 0 0     PAST MEDICAL HISTORY  Past Medical History:   Diagnosis Date   • Acid reflux    • Anxiety    • Asthma    • Depression    • Migraines    • Stomach ulcer      PAST SURGICAL HISTORY  Past Surgical History:    Procedure Laterality Date   • CHOLECYSTECTOMY     • ENDOSCOPY N/A 05/23/2018    Procedure: ESOPHAGOGASTRODUODENOSCOPY possible dilation;  Surgeon: Rafael Garcia MD;  Location: [facility] ENDOSCOPY;  Service: Gastroenterology   • ENDOSCOPY N/A 03/21/2022    Procedure: ESOPHAGOGASTRODUODENOSCOPY possible dilation;  Surgeon: Rafael Garcia MD;  Location: [facility] ENDOSCOPY;  Service: Gastroenterology;  Laterality: N/A;   • TONSILLECTOMY AND ADENOIDECTOMY     • WISDOM TOOTH EXTRACTION       FAMILY HISTORY  Family History   Problem Relation Age of Onset   • Hypertension Father    • Stroke Mother    • Breast cancer Mother    • Arthritis Mother    • Diabetes Mother    • Cancer Mother         breat cancer   • Hypertension Mother    • Hyperlipidemia Mother    • Hypothyroidism Mother    • Hypertension Maternal Grandmother    • Heart disease Maternal Grandfather    • Hypertension Maternal Grandfather    • Uterine cancer Neg Hx    • Ovarian cancer Neg Hx    • Colon cancer Neg Hx      SOCIAL HISTORY  Social History     Socioeconomic History   • Marital status:    Tobacco Use   • Smoking status: Never   • Smokeless tobacco: Never   Substance and Sexual Activity   • Alcohol use: No   • Drug use: No   • Sexual activity: Defer     Birth control/protection: Injection     ALLERGIES  Allergies   Allergen Reactions   • Flagyl [Metronidazole] Hives   • Nabumetone Nausea Only   • Penicillins Hives   • Sulfa Antibiotics Hives   • Amiodarone Rash   • Latex Rash     HOME MEDICATIONS  Prior to Admission medications    Medication Sig Start Date End Date Taking? Authorizing Provider   albuterol sulfate  (90 Base) MCG/ACT inhaler Inhale 2 puffs As Needed for Wheezing.    ProviderEl MD   benzonatate (TESSALON) 200 MG capsule  10/28/22   El Medrano MD   betamethasone valerate (VALISONE) 0.1 % ointment  6/10/22   El Medrano MD   citalopram (CeleXA) 10 MG tablet Take 20 mg by mouth Daily. 5/31/22  "2/16/23  El Medrano MD   cyclobenzaprine (FLEXERIL) 10 MG tablet Take 1 tablet by mouth 3 (Three) Times a Day As Needed for Muscle Spasms. 1/31/22   Mercedes Butler APRN   docusate sodium (COLACE) 100 MG capsule Take 1 capsule by mouth 2 (Two) Times a Day As Needed for Constipation. 12/15/21   Ankit Espinoza MD   fluticasone (Flonase) 50 MCG/ACT nasal spray 2 sprays into the nostril(s) as directed by provider Daily As Needed for Allergies. 4/26/22   Candie Puga APRN   gabapentin (NEURONTIN) 300 MG capsule  9/2/22   El Medrano MD   gabapentin (NEURONTIN) 300 MG capsule Take 1 capsule by mouth 3 (Three) Times a Day. 9/2/22 12/2/22  El Medrano MD   hydrOXYzine pamoate (VISTARIL) 50 MG capsule Take 1 capsule by mouth 3 (Three) Times a Day As Needed for Itching. 4/16/22   Mercedes Butler APRN   montelukast (SINGULAIR) 10 MG tablet Take 1 tablet by mouth. 5/27/22 5/28/23  El Medrano MD   pantoprazole (PROTONIX) 40 MG EC tablet Take 1 tablet by mouth. 9/12/22   El Medrano MD   promethazine-dextromethorphan (PROMETHAZINE-DM) 6.25-15 MG/5ML syrup  9/7/22   El Medrano MD   propranolol (INDERAL) 10 MG tablet Take 1 tablet by mouth 3 (Three) Times a Day. 1/10/23 1/11/24  El Medrano MD   topiramate (TOPAMAX) 25 MG capsule (sprinkle) Take 1 capsule by mouth 2 (Two) Times a Day. 9/22/22   El Medrano MD   vitamin B-12 (CYANOCOBALAMIN) 1000 MCG tablet Take 1 tablet by mouth Daily. 12/15/21   Ankit Espinoza MD     PE  /84 (BP Location: Left arm, Patient Position: Sitting, Cuff Size: Adult)   Ht 165.1 cm (65\")   Wt 117 kg (258 lb)   LMP 05/15/2023 (Approximate)   BMI 42.93 kg/m²        General: Alert, healthy, no distress, well nourished and well developed.  Neurologic: Alert, oriented to person, place, and time.  Gait normal.  Cranial nerves II-XII grossly intact.  HEENT: Normocephalic, atraumatic.  Extraocular " muscles intact, pupils equal and reactive times two.    Neck: Supple, no adenopathy, thyroid normal size, non-tender, without nodularity, trachea midline.  Lungs: Normal respiratory effort.  Clear to auscultation bilaterally.  No wheezes, rhonci, or rales.  Heart: Regular rate and rhythm.  No murmer, rub or gallop.  Abdomen: Soft, non-tender, non-distended,no masses, no hepatosplenomegaly, no hernia.  Skin: No rash, no lesions.  Extremities: No cyanosis, clubbing or edema.  PELVIC EXAM: Deferred today    IMPRESSION  Sanjuana Allison is a 33 y.o.  presenting with AUB/irregular periods.  Possible PCOS.    PLAN    1. Abnormal uterine bleeding (AUB)  Offered treatment; patient declines until work-up done.  Will also have patient come back for annual visit for pap smear.  - Insulin, Total; Future  - Prolactin; Future  - Testosterone; Future  - CBC & Differential; Future  - US Non-ob Transvaginal; Future  - DHEA-Sulfate  - Hemoglobin A1c; Future    2. PCOS (polycystic ovarian syndrome)    3. Morbid obesity with BMI of 40.0-44.9, adult  Encouraged weight loss as that does help with regulating cycles as well as fertility due to the decrease in exogenous estrogen.         Thank you for allowing me to participate in the care of this patient.  Please contact me with any questions or concerns.    This document has been electronically signed by Mariela Nuno MD on 2023 17:04 CDT.    CC: MARIA GUADALUPE Ozuna

## 2023-06-14 LAB
DHEA-S SERPL-MCNC: 114 UG/DL (ref 84.8–378)
INSULIN SERPL-ACNC: 80.8 UIU/ML (ref 2.6–24.9)

## 2023-06-16 ENCOUNTER — TELEPHONE (OUTPATIENT)
Dept: OBSTETRICS AND GYNECOLOGY | Facility: CLINIC | Age: 33
End: 2023-06-16
Payer: COMMERCIAL

## 2023-06-16 NOTE — TELEPHONE ENCOUNTER
Please call patient with results.  US shows PCOS.  Labs show insulin resistance; recommend metformin 500mg BID.  Some women find that with correcting insulin resistance with weight loss, medications like metformin, their periods and fertility are improved.  She an also talk with her PCP about if she is a candidate for weight loss medications like Wegovy.  If she would like to try metformin, please send in.  She also needs to be set up for annual w/ pap smear (she did not do that when she left the office).    TVUS:   1.  Unremarkable exam, except for ovarian morphology suggestive of polycystic ovaries.    06/12/23 14:14  DHEA-Sulfate: 114.0  Prolactin: 7.65  Hemoglobin A1C: 5.40  Testosterone, Total: 21.00  Insulin: 80.8 (H)  Hemoglobin: 15.0    Mariela Nuno MD  6/16/2023  02:04 CDT

## 2023-06-19 RX ORDER — METFORMIN HYDROCHLORIDE 500 MG/1
500 TABLET, EXTENDED RELEASE ORAL 2 TIMES DAILY
Qty: 60 TABLET | Refills: 11 | Status: SHIPPED | OUTPATIENT
Start: 2023-06-19

## 2023-06-19 NOTE — TELEPHONE ENCOUNTER
Called and spoke with patient. Discussed results and dr hardy recommendations. Patient verbalized understanding and agreeable to metformin. Sent in to [facility] pharmacy in [place], scheduled patient for annual/pap.

## 2023-07-21 ENCOUNTER — HOSPITAL ENCOUNTER (OUTPATIENT)
Facility: HOSPITAL | Age: 33
Setting detail: HOSPITAL OUTPATIENT SURGERY
Discharge: HOME | End: 2023-07-21
Attending: INTERNAL MEDICINE | Admitting: INTERNAL MEDICINE
Payer: COMMERCIAL

## 2023-07-21 VITALS
RESPIRATION RATE: 18 BRPM | BODY MASS INDEX: 43.32 KG/M2 | DIASTOLIC BLOOD PRESSURE: 66 MMHG | TEMPERATURE: 97.5 F | OXYGEN SATURATION: 100 % | HEART RATE: 92 BPM | SYSTOLIC BLOOD PRESSURE: 120 MMHG | WEIGHT: 260 LBS | HEIGHT: 65 IN

## 2023-07-21 DIAGNOSIS — K21.00 GASTROESOPHAGEAL REFLUX DISEASE WITH ESOPHAGITIS WITHOUT HEMORRHAGE: ICD-10-CM

## 2023-07-21 DIAGNOSIS — R13.19 ESOPHAGEAL DYSPHAGIA: ICD-10-CM

## 2023-07-21 PROCEDURE — C1769 GUIDE WIRE: HCPCS | Performed by: INTERNAL MEDICINE

## 2023-07-21 PROCEDURE — 43239 EGD BIOPSY SINGLE/MULTIPLE: CPT | Performed by: INTERNAL MEDICINE

## 2023-07-21 PROCEDURE — 43248 EGD GUIDE WIRE INSERTION: CPT | Performed by: INTERNAL MEDICINE

## 2023-07-21 RX ORDER — DEXTROSE AND SODIUM CHLORIDE 5; .45 G/100ML; G/100ML
30 INJECTION, SOLUTION INTRAVENOUS CONTINUOUS PRN
Status: DISCONTINUED | OUTPATIENT
Start: 2023-07-21 | End: 2023-07-21 | Stop reason: HOSPADM

## 2023-07-21 RX ADMIN — DEXTROSE AND SODIUM CHLORIDE 30 ML/HR: 5; 450 INJECTION, SOLUTION INTRAVENOUS at 10:02

## 2023-07-21 NOTE — H&P
No chief complaint on file.      Subjective    Sanjuana Allison is a 33 y.o. female. she is here today for follow-up.                                                                  Assessment & Plan                                     No diagnosis found.    Plan; schedule patient for EGD with dilation if indicated due to history of esophageal stricture and recurrent dysphagia.  Continue PPI daily discussed importance of safe swallowing measures.  Follow-up after test return office sooner if needed    Follow-up: No follow-ups on file.     HPI I agree with the current note with no changes in the history.  Risks and benefits discussed with patient. Patient understands these and would like to proceed with procedure.    33-year-old female presents to discuss recurrent dysphagia and chronic GERD.  States she has been taking Protonix daily about 2 months ago began have trouble with swallowing oral intake and pills.  She denies any abdominal pain, nausea, vomiting change in bowel habits or blood in stool.  Previous EGD completed March 2022 noted benign-appearing stenosis dilated 54 Cuban with good relief of symptoms after dilation.  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    Review of Systems  Review of Systems   Constitutional:  Negative for activity change, appetite change, chills, diaphoresis, fatigue, fever and unexpected weight change.   HENT:  Positive for trouble swallowing. Negative for sore throat.    Respiratory:  Negative for shortness of breath.    Gastrointestinal:  Negative for abdominal distention, abdominal pain, anal bleeding, blood in stool, constipation, diarrhea, nausea, rectal pain and vomiting.   Musculoskeletal:  Negative for arthralgias.   Skin:  Negative for pallor.   Neurological:  Negative for light-headedness.     /90 (BP Location:  "Left arm, Patient Position: Lying)   Pulse 97   Temp 97.4 °F (36.3 °C) (Temporal)   Resp 18   Ht 165.1 cm (65\")   Wt 118 kg (260 lb)   LMP 07/08/2023 (Exact Date)   SpO2 96%   BMI 43.27 kg/m²     Objective      Physical Exam  Constitutional:       General: She is not in acute distress.     Appearance: Normal appearance. She is obese. She is not ill-appearing or toxic-appearing.   HENT:      Head: Normocephalic and atraumatic.   Pulmonary:      Effort: Pulmonary effort is normal.   Abdominal:      General: Abdomen is flat. Bowel sounds are normal. There is no distension.      Palpations: Abdomen is soft. There is no mass.      Tenderness: There is abdominal tenderness in the epigastric area.   Neurological:      Mental Status: She is alert.             The following portions of the patient's history were reviewed and updated as appropriate:   Past Medical History:   Diagnosis Date    Acid reflux     Anxiety     Asthma     Depression     Migraines     Stomach ulcer      Past Surgical History:   Procedure Laterality Date    CHOLECYSTECTOMY      ENDOSCOPY N/A 05/23/2018    Procedure: ESOPHAGOGASTRODUODENOSCOPY possible dilation;  Surgeon: Rafael Garcia MD;  Location: [facility] ENDOSCOPY;  Service: Gastroenterology    ENDOSCOPY N/A 03/21/2022    Procedure: ESOPHAGOGASTRODUODENOSCOPY possible dilation;  Surgeon: Rafael Garcia MD;  Location: [facility] ENDOSCOPY;  Service: Gastroenterology;  Laterality: N/A;    TONSILLECTOMY AND ADENOIDECTOMY      WISDOM TOOTH EXTRACTION       Family History   Problem Relation Age of Onset    Hypertension Father     Stroke Mother     Breast cancer Mother     Arthritis Mother     Diabetes Mother     Cancer Mother         breat cancer    Hypertension Mother     Hyperlipidemia Mother     Hypothyroidism Mother     Hypertension Maternal Grandmother     Heart disease Maternal Grandfather     Hypertension Maternal Grandfather     Uterine cancer Neg Hx     Ovarian cancer Neg Hx     Colon " cancer Neg Hx      OB History          0    Para   0    Term   0       0    AB   0    Living   0         SAB   0    IAB   0    Ectopic   0    Molar   0    Multiple   0    Live Births   0              Allergies   Allergen Reactions    Flagyl [Metronidazole] Hives    Nabumetone Nausea Only    Penicillins Hives    Sulfa Antibiotics Hives    Amiodarone Rash    Latex Rash     Social History     Socioeconomic History    Marital status:    Tobacco Use    Smoking status: Never    Smokeless tobacco: Never   Substance and Sexual Activity    Alcohol use: No    Drug use: No    Sexual activity: Defer     Birth control/protection: Injection     Current Medications:  Prior to Admission medications    Medication Sig Start Date End Date Taking? Authorizing Provider   albuterol sulfate  (90 Base) MCG/ACT inhaler Inhale 2 puffs As Needed for Wheezing.   Yes ProviderEl MD   betamethasone valerate (VALISONE) 0.1 % ointment  6/10/22  Yes El Medrano MD   cyclobenzaprine (FLEXERIL) 10 MG tablet Take 1 tablet by mouth 3 (Three) Times a Day As Needed for Muscle Spasms. 22  Yes Mercedes Butler APRN   docusate sodium (COLACE) 100 MG capsule Take 1 capsule by mouth 2 (Two) Times a Day As Needed for Constipation. 12/15/21  Yes Ankit Espinoza MD   ergocalciferol (ERGOCALCIFEROL) 1.25 MG (34781 UT) capsule Take 1 capsule by mouth. 22 Yes ProviderEl MD   fluticasone (Flonase) 50 MCG/ACT nasal spray 2 sprays into the nostril(s) as directed by provider Daily As Needed for Allergies. 22  Yes Candie Puga APRN   gabapentin (NEURONTIN) 300 MG capsule  22  Yes ProviderEl MD   hydrOXYzine pamoate (VISTARIL) 50 MG capsule Take 1 capsule by mouth 3 (Three) Times a Day As Needed for Itching. 22  Yes Mercedes Butler APRN   montelukast (SINGULAIR) 10 MG tablet Take 1 tablet by mouth. 22 Yes El Medrano MD    pantoprazole (PROTONIX) 40 MG EC tablet Take 1 tablet by mouth. 9/12/22  Yes El Medrano MD   promethazine-dextromethorphan (PROMETHAZINE-DM) 6.25-15 MG/5ML syrup  9/7/22  Yes El Medrano MD   propranolol (INDERAL) 10 MG tablet Take 1 tablet by mouth 3 (Three) Times a Day. 1/10/23 1/11/24 Yes El Medrano MD   topiramate (TOPAMAX) 25 MG capsule (sprinkle) Take 1 capsule by mouth 2 (Two) Times a Day. 9/22/22  Yes El Medrano MD   vitamin B-12 (CYANOCOBALAMIN) 1000 MCG tablet Take 1 tablet by mouth Daily. 12/15/21  Yes Ankit Espinoza MD   benzonatate (TESSALON) 200 MG capsule  10/28/22   El Medrano MD   citalopram (CeleXA) 10 MG tablet Take 20 mg by mouth Daily. 5/31/22 2/16/23  El Medrano MD   gabapentin (NEURONTIN) 300 MG capsule Take 1 capsule by mouth 3 (Three) Times a Day. 9/2/22 12/2/22  El Medrano MD     Orders placed during this encounter include:  No orders of the defined types were placed in this encounter.    ESOPHAGOGASTRODUODENOSCOPY possible dilation (N/A)  No orders of the defined types were placed in this encounter.        Review and/or summary of lab tests, radiology, procedures, medications. Review and summary of old records and obtaining of history. The risks and benefits of my recommendations, as well as other treatment options were discussed . Any questions/concerned were answered. Patient voiced understanding and agreement.          This document has been electronically signed by Rafael Garcia MD on July 21, 2023 09:55 CDT                                               Results for orders placed or performed in visit on 07/18/23   LIQUID-BASED PAP SMEAR WITH HPV GENOTYPING REGARDLESS OF INTERPRETATION ([facility])    Specimen: ThinPrep Vial   Result Value Ref Range    Reference Lab Report       [facility]  [address] [place] [address]  [address]  Phone: [phone] or [phone]  Fax:  [phone]  Raoul Olivo M.D., Medical Director    PATIENT NAME                                     LABORATORY NO.  [id number]   MIR HORTON.                        [id number]  [id number]                                 AGE                    SEX   SSN              CLIENT REF #  [facility]                33        1990      F     [id number]      [id number]  [facility]                                                                                [address]                            REQUESTING M.D.           ATTENDING M.D.         COPY TO.  [facility] 1, 2ND FLOOR                  [place] [id number]                     DATE COLLECTED            DATE RECEIVED          DATE REPORTED  2023    ThinPrep Pap with Cytyc  Imaging    DIAGNOSIS:  Epithelial cell abnormality. (ASC) Atypical squamous cells of undetermined  significance.    Professional interpretation rendered by Jah Wright M.D.,F.C.A.P. at [facility], [place], [address] [place] [address].  SPECIMEN ADEQUACY:  SATISFACTORY FOR EVALUATION  Transformation zone is present.  SOURCE OF SPECIMEN:       CERVICAL/ENDOCERVICAL  LMP:   2023  SLIDES:  1  CLINICAL HISTORY:  Encounter for well woman exam with routine gynecological exam    HPV  HR-HPV POOL: Negative    The Aptima HPV assay is an in vitro nucleic acid amplification test for the  qualitative detection of E6/E7 viral messenger RNA from 14 high risk types of  HPV in cervical specimens. The high risk HPV types detected include: 16, 18,  31, 33, 35, 39, 45, 51, 52, 56, 58, 59, 66, 68    CYTOTECHNOLOGIST:             KENYA TSAI (ASCP)                                      REVIEWED, DIAGNOSED AND  ELECTRONICALLY SIGNED BY:      Jah Wright M.D.,F.C.A.P.    CPT CODES:  96438, 81041,  02950     Results for orders placed or performed in visit on 23   CBC Auto Differential     Specimen: Blood   Result Value Ref Range    WBC 14.32 (H) 3.40 - 10.80 10*3/mm3    RBC 5.51 (H) 3.77 - 5.28 10*6/mm3    Hemoglobin 14.8 12.0 - 15.9 g/dL    Hematocrit 45.5 34.0 - 46.6 %    MCV 82.6 79.0 - 97.0 fL    MCH 26.9 26.6 - 33.0 pg    MCHC 32.5 31.5 - 35.7 g/dL    RDW 13.4 12.3 - 15.4 %    RDW-SD 39.8 37.0 - 54.0 fl    MPV 8.7 6.0 - 12.0 fL    Platelets 443 140 - 450 10*3/mm3    Neutrophil % 64.3 42.7 - 76.0 %    Lymphocyte % 24.4 19.6 - 45.3 %    Monocyte % 5.4 5.0 - 12.0 %    Eosinophil % 4.6 0.3 - 6.2 %    Basophil % 0.7 0.0 - 1.5 %    Immature Grans % 0.6 (H) 0.0 - 0.5 %    Neutrophils, Absolute 9.20 (H) 1.70 - 7.00 10*3/mm3    Lymphocytes, Absolute 3.50 (H) 0.70 - 3.10 10*3/mm3    Monocytes, Absolute 0.78 0.10 - 0.90 10*3/mm3    Eosinophils, Absolute 0.66 (H) 0.00 - 0.40 10*3/mm3    Basophils, Absolute 0.10 0.00 - 0.20 10*3/mm3    Immature Grans, Absolute 0.08 (H) 0.00 - 0.05 10*3/mm3    nRBC 0.0 0.0 - 0.2 /100 WBC   Iron and TIBC    Specimen: Blood   Result Value Ref Range    Iron 59 37 - 145 mcg/dL    Iron Saturation (TSAT) 15 (L) 20 - 50 %    Transferrin 257 200 - 360 mg/dL    TIBC 383 298 - 536 mcg/dL   Folate    Specimen: Blood   Result Value Ref Range    Folate 8.58 4.78 - 24.20 ng/mL   Ferritin    Specimen: Blood   Result Value Ref Range    Ferritin 374.30 (H) 13.00 - 150.00 ng/mL   Vitamin B12    Specimen: Blood   Result Value Ref Range    Vitamin B-12 1,027 (H) 211 - 946 pg/mL   Results for orders placed or performed in visit on 06/12/23   CBC Auto Differential    Specimen: Blood   Result Value Ref Range    WBC 10.87 (H) 3.40 - 10.80 10*3/mm3    RBC 5.56 (H) 3.77 - 5.28 10*6/mm3    Hemoglobin 15.0 12.0 - 15.9 g/dL    Hematocrit 44.4 34.0 - 46.6 %    MCV 79.9 79.0 - 97.0 fL    MCH 27.0 26.6 - 33.0 pg    MCHC 33.8 31.5 - 35.7 g/dL    RDW 13.6 12.3 - 15.4 %    RDW-SD 38.8 37.0 - 54.0 fl    MPV 10.8 6.0 - 12.0 fL    Platelets 220 140 - 450 10*3/mm3   Prolactin    Specimen: Blood   Result Value Ref  Range    Prolactin 7.65 4.79 - 23.30 ng/mL   Insulin, Total    Specimen: Blood   Result Value Ref Range    Insulin 80.8 (H) 2.6 - 24.9 uIU/mL   Manual Differential    Specimen: Blood   Result Value Ref Range    Neutrophil % 60.8 42.7 - 76.0 %    Lymphocyte % 27.8 19.6 - 45.3 %    Monocyte % 8.2 5.0 - 12.0 %    Eosinophil % 2.1 0.3 - 6.2 %    Basophil % 1.0 0.0 - 1.5 %    Neutrophils Absolute 6.61 1.70 - 7.00 10*3/mm3    Lymphocytes Absolute 3.02 0.70 - 3.10 10*3/mm3    Monocytes Absolute 0.89 0.10 - 0.90 10*3/mm3    Eosinophils Absolute 0.23 0.00 - 0.40 10*3/mm3    Basophils Absolute 0.11 0.00 - 0.20 10*3/mm3    RBC Morphology Normal Normal    WBC Morphology Normal Normal    Platelet Morphology Normal Normal   Testosterone    Specimen: Blood   Result Value Ref Range    Testosterone, Total 21.00 8.40 - 48.10 ng/dL   Hemoglobin A1c    Specimen: Blood   Result Value Ref Range    Hemoglobin A1C 5.40 4.80 - 5.60 %   Results for orders placed or performed in visit on 06/12/23   DHEA-Sulfate    Specimen: Blood   Result Value Ref Range    DHEA-Sulfate 114.0 84.8 - 378.0 ug/dL   Results for orders placed or performed in visit on 04/11/23   CBC Auto Differential    Specimen: Arm, Right; Blood   Result Value Ref Range    WBC      RBC      Hemoglobin      Hematocrit      MCV      MCH      MCHC      RDW      Platelets      Neutrophil %      Lymphocyte %      Monocyte %      Eosinophil %      Basophil %      Neutrophils, Absolute      Lymphocytes, Absolute      Monocytes, Absolute      Eosinophils, Absolute      Basophils, Absolute     Iron and TIBC    Specimen: Blood   Result Value Ref Range    Iron 46 37 - 145 mcg/dL    Iron Saturation (TSAT) 12 (L) 20 - 50 %    Transferrin 247 200 - 360 mg/dL    TIBC 368 298 - 536 mcg/dL   Folate    Specimen: Blood   Result Value Ref Range    Folate 12.40 4.78 - 24.20 ng/mL   Ferritin    Specimen: Blood   Result Value Ref Range    Ferritin 492.90 (H) 13.00 - 150.00 ng/mL   Vitamin B12     Specimen: Blood   Result Value Ref Range    Vitamin B-12 918 211 - 946 pg/mL   Results for orders placed or performed in visit on 01/10/23   Vitamin B12 & Folate    Specimen: Arm, Left; Blood   Result Value Ref Range    Folate 13.50 4.78 - 24.20 ng/mL    Vitamin B-12 747 211 - 946 pg/mL   CBC Auto Differential    Specimen: Arm, Left; Blood   Result Value Ref Range    WBC 16.53 (H) 3.40 - 10.80 10*3/mm3    RBC 5.17 3.77 - 5.28 10*6/mm3    Hemoglobin 13.3 12.0 - 15.9 g/dL    Hematocrit 42.4 34.0 - 46.6 %    MCV 82.0 79.0 - 97.0 fL    MCH 25.7 (L) 26.6 - 33.0 pg    MCHC 31.4 (L) 31.5 - 35.7 g/dL    RDW 14.4 12.3 - 15.4 %    RDW-SD 42.1 37.0 - 54.0 fl    MPV 8.3 6.0 - 12.0 fL    Platelets 434 140 - 450 10*3/mm3    Neutrophil % 71.3 42.7 - 76.0 %    Lymphocyte % 20.4 19.6 - 45.3 %    Monocyte % 4.9 (L) 5.0 - 12.0 %    Eosinophil % 2.5 0.3 - 6.2 %    Basophil % 0.4 0.0 - 1.5 %    Immature Grans % 0.5 0.0 - 0.5 %    Neutrophils, Absolute 11.79 (H) 1.70 - 7.00 10*3/mm3    Lymphocytes, Absolute 3.37 (H) 0.70 - 3.10 10*3/mm3    Monocytes, Absolute 0.81 0.10 - 0.90 10*3/mm3    Eosinophils, Absolute 0.41 (H) 0.00 - 0.40 10*3/mm3    Basophils, Absolute 0.07 0.00 - 0.20 10*3/mm3    Immature Grans, Absolute 0.08 (H) 0.00 - 0.05 10*3/mm3    nRBC 0.0 0.0 - 0.2 /100 WBC     *Note: Due to a large number of results and/or encounters for the requested time period, some results have not been displayed. A complete set of results can be found in Results Review.

## 2023-07-25 LAB — REF LAB TEST METHOD: NORMAL

## 2023-08-17 ENCOUNTER — OFFICE VISIT (OUTPATIENT)
Dept: GASTROENTEROLOGY | Facility: CLINIC | Age: 33
End: 2023-08-17
Payer: COMMERCIAL

## 2023-08-17 VITALS
HEART RATE: 88 BPM | WEIGHT: 264.4 LBS | HEIGHT: 65 IN | BODY MASS INDEX: 44.05 KG/M2 | DIASTOLIC BLOOD PRESSURE: 82 MMHG | SYSTOLIC BLOOD PRESSURE: 128 MMHG

## 2023-08-17 DIAGNOSIS — K22.2 STRICTURE AND STENOSIS OF ESOPHAGUS: Primary | ICD-10-CM

## 2023-08-17 DIAGNOSIS — K21.00 GASTROESOPHAGEAL REFLUX DISEASE WITH ESOPHAGITIS WITHOUT HEMORRHAGE: ICD-10-CM

## 2023-08-17 PROCEDURE — 99213 OFFICE O/P EST LOW 20 MIN: CPT | Performed by: NURSE PRACTITIONER

## 2023-08-17 RX ORDER — ZOLPIDEM TARTRATE 6.25 MG/1
6.25 TABLET, FILM COATED, EXTENDED RELEASE ORAL
COMMUNITY
Start: 2023-08-01

## 2023-08-17 RX ORDER — SUCRALFATE 1 G/1
TABLET ORAL
COMMUNITY
End: 2023-08-17

## 2023-08-17 NOTE — PROGRESS NOTES
"                                                                                                                  Chief Complaint   Patient presents with    Heartburn    Difficulty Swallowing    endo f/u        Subjective    Sanjuana Allison is a 33 y.o. female. she is here today for follow-up.                                                                  Assessment & Plan                                     1. Stricture and stenosis of esophagus    2. Gastroesophageal reflux disease with esophagitis without hemorrhage      Plan; recommend patient continue Protonix daily encourage avoidance of gastric irritants or any known triggers.  Follow-up in GI office sooner if symptoms of dysphagia recur or worsen otherwise recheck in 1 year.    Follow-up: Return in about 1 year (around 8/17/2024) for Recheck.     HPI  33-year-old female presents to discuss reflux, dysphagia and EGD results.  Denies any abdominal pain change in bowel movements blood in stool or GI complaints at this time.  Reports reflux is well controlled PPI daily.  EGD completed 7/21/2023 noted benign-appearing stenosis dilated 54 Azeri gastritis seen in the stomach duodenum appeared normal.  Antrum biopsy noted reactive gastropathy.    Review of Systems  Review of Systems   Constitutional:  Negative for activity change, appetite change, chills, diaphoresis, fatigue, fever and unexpected weight change.   HENT:  Negative for sore throat and trouble swallowing (better since dilation).    Respiratory:  Negative for shortness of breath.    Gastrointestinal:  Negative for abdominal distention, abdominal pain, anal bleeding, blood in stool, constipation, diarrhea, nausea, rectal pain and vomiting.   Musculoskeletal:  Negative for arthralgias.   Skin:  Negative for pallor.   Neurological:  Negative for light-headedness.     /82 (BP Location: Left arm)   Pulse 88   Ht 165.1 cm (65\")   Wt 120 kg (264 lb 6.4 oz)   LMP 07/08/2023 (Exact Date)   " BMI 44.00 kg/mý     Objective      Physical Exam  Constitutional:       General: She is not in acute distress.     Appearance: Normal appearance. She is normal weight. She is not ill-appearing or toxic-appearing.   HENT:      Head: Normocephalic and atraumatic.   Pulmonary:      Effort: Pulmonary effort is normal.   Abdominal:      General: Abdomen is flat. Bowel sounds are normal. There is no distension.      Palpations: Abdomen is soft. There is no mass.      Tenderness: There is no abdominal tenderness.   Neurological:      Mental Status: She is alert.             The following portions of the patient's history were reviewed and updated as appropriate:   Past Medical History:   Diagnosis Date    Acid reflux     Anxiety     Asthma     Depression     Migraines     Stomach ulcer      Past Surgical History:   Procedure Laterality Date    CHOLECYSTECTOMY      ENDOSCOPY N/A 05/23/2018    Procedure: ESOPHAGOGASTRODUODENOSCOPY possible dilation;  Surgeon: Rafael Garcia MD;  Location: [facility] ENDOSCOPY;  Service: Gastroenterology    ENDOSCOPY N/A 03/21/2022    Procedure: ESOPHAGOGASTRODUODENOSCOPY possible dilation;  Surgeon: Rafael Garcia MD;  Location: [facility] ENDOSCOPY;  Service: Gastroenterology;  Laterality: N/A;    ENDOSCOPY N/A 7/21/2023    Procedure: ESOPHAGOGASTRODUODENOSCOPY possible dilation;  Surgeon: Rafael Garcia MD;  Location: [facility] ENDOSCOPY;  Service: Gastroenterology;  Laterality: N/A;    TONSILLECTOMY AND ADENOIDECTOMY      WISDOM TOOTH EXTRACTION       Family History   Problem Relation Age of Onset    Hypertension Father     Stroke Mother     Breast cancer Mother     Arthritis Mother     Diabetes Mother     Cancer Mother         breat cancer    Hypertension Mother     Hyperlipidemia Mother     Hypothyroidism Mother     Hypertension Maternal Grandmother     Heart disease Maternal Grandfather     Hypertension Maternal Grandfather     Uterine cancer Neg Hx     Ovarian cancer Neg Hx     Colon cancer  Neg Hx      OB History          0    Para   0    Term   0       0    AB   0    Living   0         SAB   0    IAB   0    Ectopic   0    Molar   0    Multiple   0    Live Births   0              Allergies   Allergen Reactions    Flagyl [Metronidazole] Hives    Nabumetone Nausea Only    Penicillins Hives    Sulfa Antibiotics Hives    Amiodarone Rash    Latex Rash     Social History     Socioeconomic History    Marital status:    Tobacco Use    Smoking status: Never    Smokeless tobacco: Never   Substance and Sexual Activity    Alcohol use: No    Drug use: No    Sexual activity: Defer     Birth control/protection: Injection     Current Medications:  Prior to Admission medications    Medication Sig Start Date End Date Taking? Authorizing Provider   albuterol sulfate  (90 Base) MCG/ACT inhaler Inhale 2 puffs As Needed for Wheezing.   Yes El Medrano MD   betamethasone valerate (VALISONE) 0.1 % ointment  6/10/22  Yes El Medrano MD   cyclobenzaprine (FLEXERIL) 10 MG tablet Take 1 tablet by mouth 3 (Three) Times a Day As Needed for Muscle Spasms. 22  Yes Mercedes Butler APRN   docusate sodium (COLACE) 100 MG capsule Take 1 capsule by mouth 2 (Two) Times a Day As Needed for Constipation. 12/15/21  Yes Ankit Espinoza MD   fluticasone (Flonase) 50 MCG/ACT nasal spray 2 sprays into the nostril(s) as directed by provider Daily As Needed for Allergies. 22  Yes Candie Puga APRN   folic acid (FOLVITE) 1 MG tablet Take 1 tablet by mouth Daily. 23  Yes Ankit Espinoza MD   metFORMIN ER (GLUCOPHAGE-XR) 500 MG 24 hr tablet Take 1 tablet by mouth 2 (Two) Times a Day. 23  Yes Mariela Nuno MD   pantoprazole (PROTONIX) 40 MG EC tablet Take 1 tablet by mouth. 22  Yes El Medrano MD   propranolol (INDERAL) 10 MG tablet Take 1 tablet by mouth 3 (Three) Times a Day. 1/10/23 1/11/24 Yes El Medrano MD   sucralfate  (CARAFATE) 1 g tablet Take 1 tablet twice a day by oral route for 30 days.   Yes ProviderEl MD   vitamin B-12 (CYANOCOBALAMIN) 1000 MCG tablet Take 1 tablet by mouth Daily. 12/15/21  Yes Ankit Espinoza MD   zolpidem CR (AMBIEN CR) 6.25 MG CR tablet Take 1 tablet by mouth. 8/1/23  Yes El Medrano MD   benzonatate (TESSALON) 200 MG capsule  10/28/22   El Medrano MD   citalopram (CeleXA) 10 MG tablet Take 2 tablets by mouth Daily. 5/31/22 7/12/23  El Medrano MD   gabapentin (NEURONTIN) 300 MG capsule Take 1 capsule by mouth 3 (Three) Times a Day. 9/2/22 12/2/22  El Medrano MD   hydrOXYzine pamoate (VISTARIL) 50 MG capsule Take 1 capsule by mouth 3 (Three) Times a Day As Needed for Itching.  Patient not taking: Reported on 8/17/2023 4/16/22   Mercedes Butler APRN   montelukast (SINGULAIR) 10 MG tablet Take 1 tablet by mouth. 5/27/22 7/12/23  El Medrano MD   topiramate (TOPAMAX) 25 MG capsule (sprinkle) Take 1 capsule by mouth 2 (Two) Times a Day. 9/22/22 8/17/23  El Medrano MD     Orders placed during this encounter include:  No orders of the defined types were placed in this encounter.    * Surgery not found *  No orders of the defined types were placed in this encounter.        Review and/or summary of lab tests, radiology, procedures, medications. Review and summary of old records and obtaining of history. The risks and benefits of my recommendations, as well as other treatment options were discussed . Any questions/concerned were answered. Patient voiced understanding and agreement.          This document has been electronically signed by MARIA GUADALUPE Heredia on August 18, 2023 13:34 CDT                                               Results for orders placed or performed during the hospital encounter of 07/21/23   TISSUE EXAM, [facility] ([facility])    Specimen: Gastric, Antrum; Tissue   Result Value Ref Range    Reference Lab Report        [facility]  [address] [place] [address]  Phone: [phone] or [phone]  Fax: [phone]  Raoul Olivo M.D., Medical Director    PATIENT NAME                                     LABORATORY NO.  MIR LINCOLN.                        [id number]  [id number]                                 AGE                    SEX   N              CLIENT REF #  [facility]                   33        1990      F     [id number]      [id number]    [place]                               REQUESTING M.D.           ATTENDING M.D.         COPY TO.  [address] [place] [address]                     DATE COLLECTED            DATE RECEIVED          DATE REPORTED  2023    DIAGNOSIS:  GASTRIC ANTRUM, BIOPSY:  Reactive gastropathy    JBS/sdl    CLINICAL HISTORY:  Gastroesophageal reflux disease with esophagitis  without hemorrhage,  esophageal dysphagia    SPECIMENS RECEIVED:  GASTRIC ANTRUM, BIOPSY    MICROSCOPIC DESCRIPTION:  Tissue blocks are prepared and slides are examined microscopically on all  specimens. See diagnosis for details.    Professional interpretation rendered by Rd Quinones M.D. at [facility], [address] [place] [address].    GROSS DESCRIPTION:  Labeled gastric antrum is a 0.3 x 0.2 x 0.2 cm portion of tan soft tissue which is  submitted entirely in 1 block.  BW    REVIEWED, DIAGNOSED AND ELECTRONICALLY  SIGNED BY:    Rd Quinones M.D.  CPT CODES:  01505     Results for orders placed or performed in visit on 23   LIQUID-BASED PAP SMEAR WITH HPV GENOTYPING REGARDLESS OF INTERPRETATION ([facility])    Specimen: ThinPrep Vial   Result Value Ref Range    Reference Lab Report       [facility]  [address] [place] [address]  Phone: [phone] or  [phone]  Fax: [phone]  Raoul Olivo M.D., Medical Director    PATIENT NAME                                     LABORATORY NO.  [address]MIR BADILLO.                        [id number]  [id number]                                 AGE                    SEX   SSN              CLIENT REF #  [facility]                33        1990      F     [id number]      [id number]  [facility]                                                                                [address]                            REQUESTING M.D.           ATTENDING M.D.         [facility]                  [place] [address]                     DATE COLLECTED            DATE RECEIVED          DATE REPORTED  2023    ThinPrep Pap with Cytyc  Imaging    DIAGNOSIS:  Epithelial cell abnormality. (ASC) Atypical squamous cells of undetermined  significance.    Professional interpretation rendered by Jah Wright M.D.,F.C.A.P. at [facility], [address] [place] [address].  SPECIMEN ADEQUACY:  SATISFACTORY FOR EVALUATION  Transformation zone is present.  SOURCE OF SPECIMEN:       CERVICAL/ENDOCERVICAL  LMP:   2023  SLIDES:  1  CLINICAL HISTORY:  Encounter for well woman exam with routine gynecological exam    HPV  HR-HPV POOL: Negative    The Aptima HPV assay is an in vitro nucleic acid amplification test for the  qualitative detection of E6/E7 viral messenger RNA from 14 high risk types of  HPV in cervical specimens. The high risk HPV types detected include: 16, 18,  31, 33, 35, 39, 45, 51, 52, 56, 58, 59, 66, 68    CYTOTECHNOLOGIST:             KENYA TSAI (ASCP)                                      REVIEWED, DIAGNOSED AND  ELECTRONICALLY SIGNED BY:      Jah Wright M.D.,F.C.A.P.    CPT CODES:  33209, 21212,  17362     Results for orders placed or performed in visit on 23   CBC  Auto Differential    Specimen: Blood   Result Value Ref Range    WBC 14.32 (H) 3.40 - 10.80 10*3/mm3    RBC 5.51 (H) 3.77 - 5.28 10*6/mm3    Hemoglobin 14.8 12.0 - 15.9 g/dL    Hematocrit 45.5 34.0 - 46.6 %    MCV 82.6 79.0 - 97.0 fL    MCH 26.9 26.6 - 33.0 pg    MCHC 32.5 31.5 - 35.7 g/dL    RDW 13.4 12.3 - 15.4 %    RDW-SD 39.8 37.0 - 54.0 fl    MPV 8.7 6.0 - 12.0 fL    Platelets 443 140 - 450 10*3/mm3    Neutrophil % 64.3 42.7 - 76.0 %    Lymphocyte % 24.4 19.6 - 45.3 %    Monocyte % 5.4 5.0 - 12.0 %    Eosinophil % 4.6 0.3 - 6.2 %    Basophil % 0.7 0.0 - 1.5 %    Immature Grans % 0.6 (H) 0.0 - 0.5 %    Neutrophils, Absolute 9.20 (H) 1.70 - 7.00 10*3/mm3    Lymphocytes, Absolute 3.50 (H) 0.70 - 3.10 10*3/mm3    Monocytes, Absolute 0.78 0.10 - 0.90 10*3/mm3    Eosinophils, Absolute 0.66 (H) 0.00 - 0.40 10*3/mm3    Basophils, Absolute 0.10 0.00 - 0.20 10*3/mm3    Immature Grans, Absolute 0.08 (H) 0.00 - 0.05 10*3/mm3    nRBC 0.0 0.0 - 0.2 /100 WBC   Iron and TIBC    Specimen: Blood   Result Value Ref Range    Iron 59 37 - 145 mcg/dL    Iron Saturation (TSAT) 15 (L) 20 - 50 %    Transferrin 257 200 - 360 mg/dL    TIBC 383 298 - 536 mcg/dL   Folate    Specimen: Blood   Result Value Ref Range    Folate 8.58 4.78 - 24.20 ng/mL   Ferritin    Specimen: Blood   Result Value Ref Range    Ferritin 374.30 (H) 13.00 - 150.00 ng/mL   Vitamin B12    Specimen: Blood   Result Value Ref Range    Vitamin B-12 1,027 (H) 211 - 946 pg/mL   Results for orders placed or performed in visit on 06/12/23   CBC Auto Differential    Specimen: Blood   Result Value Ref Range    WBC 10.87 (H) 3.40 - 10.80 10*3/mm3    RBC 5.56 (H) 3.77 - 5.28 10*6/mm3    Hemoglobin 15.0 12.0 - 15.9 g/dL    Hematocrit 44.4 34.0 - 46.6 %    MCV 79.9 79.0 - 97.0 fL    MCH 27.0 26.6 - 33.0 pg    MCHC 33.8 31.5 - 35.7 g/dL    RDW 13.6 12.3 - 15.4 %    RDW-SD 38.8 37.0 - 54.0 fl    MPV 10.8 6.0 - 12.0 fL    Platelets 220 140 - 450 10*3/mm3   Prolactin    Specimen:  Blood   Result Value Ref Range    Prolactin 7.65 4.79 - 23.30 ng/mL   Insulin, Total    Specimen: Blood   Result Value Ref Range    Insulin 80.8 (H) 2.6 - 24.9 uIU/mL   Manual Differential    Specimen: Blood   Result Value Ref Range    Neutrophil % 60.8 42.7 - 76.0 %    Lymphocyte % 27.8 19.6 - 45.3 %    Monocyte % 8.2 5.0 - 12.0 %    Eosinophil % 2.1 0.3 - 6.2 %    Basophil % 1.0 0.0 - 1.5 %    Neutrophils Absolute 6.61 1.70 - 7.00 10*3/mm3    Lymphocytes Absolute 3.02 0.70 - 3.10 10*3/mm3    Monocytes Absolute 0.89 0.10 - 0.90 10*3/mm3    Eosinophils Absolute 0.23 0.00 - 0.40 10*3/mm3    Basophils Absolute 0.11 0.00 - 0.20 10*3/mm3    RBC Morphology Normal Normal    WBC Morphology Normal Normal    Platelet Morphology Normal Normal   Testosterone    Specimen: Blood   Result Value Ref Range    Testosterone, Total 21.00 8.40 - 48.10 ng/dL   Hemoglobin A1c    Specimen: Blood   Result Value Ref Range    Hemoglobin A1C 5.40 4.80 - 5.60 %   Results for orders placed or performed in visit on 06/12/23   DHEA-Sulfate    Specimen: Blood   Result Value Ref Range    DHEA-Sulfate 114.0 84.8 - 378.0 ug/dL   Results for orders placed or performed in visit on 04/11/23   CBC Auto Differential    Specimen: Arm, Right; Blood   Result Value Ref Range    WBC      RBC      Hemoglobin      Hematocrit      MCV      MCH      MCHC      RDW      Platelets      Neutrophil %      Lymphocyte %      Monocyte %      Eosinophil %      Basophil %      Neutrophils, Absolute      Lymphocytes, Absolute      Monocytes, Absolute      Eosinophils, Absolute      Basophils, Absolute     Iron and TIBC    Specimen: Blood   Result Value Ref Range    Iron 46 37 - 145 mcg/dL    Iron Saturation (TSAT) 12 (L) 20 - 50 %    Transferrin 247 200 - 360 mg/dL    TIBC 368 298 - 536 mcg/dL   Folate    Specimen: Blood   Result Value Ref Range    Folate 12.40 4.78 - 24.20 ng/mL   Ferritin    Specimen: Blood   Result Value Ref Range    Ferritin 492.90 (H) 13.00 - 150.00  ng/mL   Vitamin B12    Specimen: Blood   Result Value Ref Range    Vitamin B-12 918 211 - 946 pg/mL   Results for orders placed or performed in visit on 01/10/23   Vitamin B12 & Folate    Specimen: Arm, Left; Blood   Result Value Ref Range    Folate 13.50 4.78 - 24.20 ng/mL    Vitamin B-12 747 211 - 946 pg/mL   CBC Auto Differential    Specimen: Arm, Left; Blood   Result Value Ref Range    WBC 16.53 (H) 3.40 - 10.80 10*3/mm3    RBC 5.17 3.77 - 5.28 10*6/mm3    Hemoglobin 13.3 12.0 - 15.9 g/dL    Hematocrit 42.4 34.0 - 46.6 %    MCV 82.0 79.0 - 97.0 fL    MCH 25.7 (L) 26.6 - 33.0 pg    MCHC 31.4 (L) 31.5 - 35.7 g/dL    RDW 14.4 12.3 - 15.4 %    RDW-SD 42.1 37.0 - 54.0 fl    MPV 8.3 6.0 - 12.0 fL    Platelets 434 140 - 450 10*3/mm3    Neutrophil % 71.3 42.7 - 76.0 %    Lymphocyte % 20.4 19.6 - 45.3 %    Monocyte % 4.9 (L) 5.0 - 12.0 %    Eosinophil % 2.5 0.3 - 6.2 %    Basophil % 0.4 0.0 - 1.5 %    Immature Grans % 0.5 0.0 - 0.5 %    Neutrophils, Absolute 11.79 (H) 1.70 - 7.00 10*3/mm3    Lymphocytes, Absolute 3.37 (H) 0.70 - 3.10 10*3/mm3    Monocytes, Absolute 0.81 0.10 - 0.90 10*3/mm3    Eosinophils, Absolute 0.41 (H) 0.00 - 0.40 10*3/mm3    Basophils, Absolute 0.07 0.00 - 0.20 10*3/mm3    Immature Grans, Absolute 0.08 (H) 0.00 - 0.05 10*3/mm3    nRBC 0.0 0.0 - 0.2 /100 WBC     *Note: Due to a large number of results and/or encounters for the requested time period, some results have not been displayed. A complete set of results can be found in Results Review.

## (undated) DEVICE — CLEANGUIDE DISPOSABLE MARKED SPRING TIP GUIDEWIRE, 1.86 MM X 210 CM: Brand: CLEANGUIDE

## (undated) DEVICE — BITEBLOCK ENDO W/STRAP 60F A/ LF DISP

## (undated) DEVICE — SINGLE-USE BIOPSY FORCEPS: Brand: RADIAL JAW 4

## (undated) DEVICE — CANN SMPL SOFTECH BIFLO ETCO2 A/M 7FT